# Patient Record
Sex: FEMALE | Race: BLACK OR AFRICAN AMERICAN | Employment: OTHER | URBAN - METROPOLITAN AREA
[De-identification: names, ages, dates, MRNs, and addresses within clinical notes are randomized per-mention and may not be internally consistent; named-entity substitution may affect disease eponyms.]

---

## 2017-01-12 ENCOUNTER — HOSPITAL ENCOUNTER (OUTPATIENT)
Dept: INFUSION THERAPY | Age: 82
Discharge: HOME | End: 2017-01-12
Payer: MEDICARE

## 2017-01-12 VITALS
SYSTOLIC BLOOD PRESSURE: 122 MMHG | DIASTOLIC BLOOD PRESSURE: 50 MMHG | HEART RATE: 51 BPM | TEMPERATURE: 97.3 F | RESPIRATION RATE: 16 BRPM

## 2017-01-12 LAB
ALBUMIN SERPL BCP-MCNC: 3.4 G/DL (ref 3.5–5)
ANION GAP BLD CALC-SCNC: 9 MMOL/L (ref 5–15)
BUN SERPL-MCNC: 38 MG/DL (ref 6–20)
BUN/CREAT SERPL: 26 (ref 12–20)
CALCIUM SERPL-MCNC: 8.3 MG/DL (ref 8.5–10.1)
CALCIUM SERPL-MCNC: 8.6 MG/DL (ref 8.5–10.1)
CHLORIDE SERPL-SCNC: 110 MMOL/L (ref 97–108)
CO2 SERPL-SCNC: 22 MMOL/L (ref 21–32)
CREAT SERPL-MCNC: 1.45 MG/DL (ref 0.55–1.02)
FERRITIN SERPL-MCNC: 613 NG/ML (ref 8–252)
GLUCOSE SERPL-MCNC: 88 MG/DL (ref 65–100)
HCT VFR BLD AUTO: 32 % (ref 35–47)
HGB BLD-MCNC: 10.3 G/DL (ref 11.5–16)
IRON SATN MFR SERPL: 25 % (ref 20–50)
IRON SERPL-MCNC: 52 UG/DL (ref 35–150)
PHOSPHATE SERPL-MCNC: 3.7 MG/DL (ref 2.6–4.7)
POTASSIUM SERPL-SCNC: 5.3 MMOL/L (ref 3.5–5.1)
PTH-INTACT SERPL-MCNC: 129.1 PG/ML (ref 14–72)
SODIUM SERPL-SCNC: 141 MMOL/L (ref 136–145)
TIBC SERPL-MCNC: 207 UG/DL (ref 250–450)
URATE SERPL-MCNC: 6.4 MG/DL (ref 2.6–6)

## 2017-01-12 PROCEDURE — 83970 ASSAY OF PARATHORMONE: CPT | Performed by: INTERNAL MEDICINE

## 2017-01-12 PROCEDURE — 74011250636 HC RX REV CODE- 250/636: Performed by: INTERNAL MEDICINE

## 2017-01-12 PROCEDURE — 36415 COLL VENOUS BLD VENIPUNCTURE: CPT | Performed by: INTERNAL MEDICINE

## 2017-01-12 PROCEDURE — 80069 RENAL FUNCTION PANEL: CPT | Performed by: INTERNAL MEDICINE

## 2017-01-12 PROCEDURE — 96372 THER/PROPH/DIAG INJ SC/IM: CPT

## 2017-01-12 PROCEDURE — 84550 ASSAY OF BLOOD/URIC ACID: CPT | Performed by: INTERNAL MEDICINE

## 2017-01-12 PROCEDURE — 83540 ASSAY OF IRON: CPT | Performed by: INTERNAL MEDICINE

## 2017-01-12 PROCEDURE — 82728 ASSAY OF FERRITIN: CPT | Performed by: INTERNAL MEDICINE

## 2017-01-12 PROCEDURE — 85018 HEMOGLOBIN: CPT | Performed by: INTERNAL MEDICINE

## 2017-01-12 RX ADMIN — ERYTHROPOIETIN 20000 UNITS: 20000 INJECTION, SOLUTION INTRAVENOUS; SUBCUTANEOUS at 15:26

## 2017-01-12 NOTE — PROGRESS NOTES
Outpatient Infusion Center Short Visit Progress Note    1430 Pt admit to [facility] for Labs and possible procrit ambulatory in stable condition. Assessment completed. No new concerns voiced. Please review pending lab results in [address] [place] [address]. Visit Vitals    /50    Pulse (!) 51    Temp 97.3 °F (36.3 °C)    Resp 16           Medications:  Procrit  SQ right arm     1530 Pt tolerated treatment well. D/c home ambulatory in no distress. Pt aware of next appointment scheduled for 2//9/17 .

## 2017-02-09 ENCOUNTER — HOSPITAL ENCOUNTER (OUTPATIENT)
Dept: INFUSION THERAPY | Age: 82
Discharge: HOME | End: 2017-02-09
Payer: MEDICARE

## 2017-02-09 VITALS
HEART RATE: 47 BPM | TEMPERATURE: 97.2 F | RESPIRATION RATE: 16 BRPM | DIASTOLIC BLOOD PRESSURE: 52 MMHG | SYSTOLIC BLOOD PRESSURE: 109 MMHG

## 2017-02-09 LAB
ALBUMIN SERPL BCP-MCNC: 3.2 G/DL (ref 3.5–5)
ANION GAP BLD CALC-SCNC: 8 MMOL/L (ref 5–15)
BUN SERPL-MCNC: 42 MG/DL (ref 6–20)
BUN/CREAT SERPL: 27 (ref 12–20)
CALCIUM SERPL-MCNC: 8.5 MG/DL (ref 8.5–10.1)
CALCIUM SERPL-MCNC: 8.7 MG/DL (ref 8.5–10.1)
CHLORIDE SERPL-SCNC: 110 MMOL/L (ref 97–108)
CO2 SERPL-SCNC: 21 MMOL/L (ref 21–32)
CREAT SERPL-MCNC: 1.55 MG/DL (ref 0.55–1.02)
GLUCOSE SERPL-MCNC: 121 MG/DL (ref 65–100)
HCT VFR BLD AUTO: 31.5 % (ref 35–47)
HGB BLD-MCNC: 10.2 G/DL (ref 11.5–16)
PHOSPHATE SERPL-MCNC: 3.6 MG/DL (ref 2.6–4.7)
POTASSIUM SERPL-SCNC: 5.4 MMOL/L (ref 3.5–5.1)
PTH-INTACT SERPL-MCNC: 126.3 PG/ML (ref 14–72)
SODIUM SERPL-SCNC: 139 MMOL/L (ref 136–145)
URATE SERPL-MCNC: 6.3 MG/DL (ref 2.6–6)

## 2017-02-09 PROCEDURE — 36415 COLL VENOUS BLD VENIPUNCTURE: CPT | Performed by: INTERNAL MEDICINE

## 2017-02-09 PROCEDURE — 80069 RENAL FUNCTION PANEL: CPT | Performed by: INTERNAL MEDICINE

## 2017-02-09 PROCEDURE — 85018 HEMOGLOBIN: CPT | Performed by: INTERNAL MEDICINE

## 2017-02-09 PROCEDURE — 74011250636 HC RX REV CODE- 250/636: Performed by: INTERNAL MEDICINE

## 2017-02-09 PROCEDURE — 83970 ASSAY OF PARATHORMONE: CPT | Performed by: INTERNAL MEDICINE

## 2017-02-09 PROCEDURE — 84550 ASSAY OF BLOOD/URIC ACID: CPT | Performed by: INTERNAL MEDICINE

## 2017-02-09 PROCEDURE — 96372 THER/PROPH/DIAG INJ SC/IM: CPT

## 2017-02-09 RX ORDER — SODIUM CHLORIDE 0.9 % (FLUSH) 0.9 %
10-40 SYRINGE (ML) INJECTION AS NEEDED
Status: DISCONTINUED | OUTPATIENT
Start: 2017-02-09 | End: 2017-02-13 | Stop reason: HOSPADM

## 2017-02-09 RX ORDER — HEPARIN 100 UNIT/ML
500 SYRINGE INTRAVENOUS AS NEEDED
Status: ACTIVE | OUTPATIENT
Start: 2017-02-09 | End: 2017-02-10

## 2017-02-09 RX ORDER — SODIUM CHLORIDE 9 MG/ML
10 INJECTION INTRAMUSCULAR; INTRAVENOUS; SUBCUTANEOUS AS NEEDED
Status: ACTIVE | OUTPATIENT
Start: 2017-02-09 | End: 2017-02-10

## 2017-02-09 RX ADMIN — ERYTHROPOIETIN 20000 UNITS: 20000 INJECTION, SOLUTION INTRAVENOUS; SUBCUTANEOUS at 15:43

## 2017-02-10 NOTE — PROGRESS NOTES
Outpatient Infusion Center Progress Note    1430 Pt admit to [facility] for labs/Procrit. Pt wheelchair bound and in stable condition. Care worker at bedside. Assessment completed. No new concerns voiced. Labs drawn peripherally as ordered. Recent Results (from the past 12 hour(s))   HGB & HCT    Collection Time: 02/09/17  2:38 PM   Result Value Ref Range    HGB 10.2 (L) 11.5 - 16.0 g/dL    HCT 31.5 (L) 35.0 - 47.0 %   RENAL FUNCTION PANEL    Collection Time: 02/09/17  2:38 PM   Result Value Ref Range    Sodium 139 136 - 145 mmol/L    Potassium 5.4 (H) 3.5 - 5.1 mmol/L    Chloride 110 (H) 97 - 108 mmol/L    CO2 21 21 - 32 mmol/L    Anion gap 8 5 - 15 mmol/L    Glucose 121 (H) 65 - 100 mg/dL    BUN 42 (H) 6 - 20 MG/DL    Creatinine 1.55 (H) 0.55 - 1.02 MG/DL    BUN/Creatinine ratio 27 (H) 12 - 20      GFR est AA 38 (L) >60 ml/min/1.73m2    GFR est non-AA 32 (L) >60 ml/min/1.73m2    Calcium 8.7 8.5 - 10.1 MG/DL    Phosphorus 3.6 2.6 - 4.7 MG/DL    Albumin 3.2 (L) 3.5 - 5.0 g/dL   URIC ACID    Collection Time: 02/09/17  2:38 PM   Result Value Ref Range    Uric acid 6.3 (H) 2.6 - 6.0 MG/DL   PTH INTACT    Collection Time: 02/09/17  2:38 PM   Result Value Ref Range    Calcium 8.5 8.5 - 10.1 MG/DL    PTH, Intact 126.3 (H) 14.0 - 72.0 pg/mL         Visit Vitals    /52    Pulse (!) 47    Temp 97.2 °F (36.2 °C)    Resp 16    Breastfeeding No       Medications:  Procrit-SQ-left arm    1545 Pt tolerated treatment well. D/c home in no distress. Pt aware of next appointment scheduled for 03/09/17.

## 2017-03-09 ENCOUNTER — HOSPITAL ENCOUNTER (OUTPATIENT)
Dept: INFUSION THERAPY | Age: 82
Discharge: HOME | End: 2017-03-09
Payer: MEDICARE

## 2017-03-09 VITALS
DIASTOLIC BLOOD PRESSURE: 61 MMHG | RESPIRATION RATE: 16 BRPM | TEMPERATURE: 97.1 F | HEART RATE: 50 BPM | SYSTOLIC BLOOD PRESSURE: 115 MMHG

## 2017-03-09 LAB
ALBUMIN SERPL BCP-MCNC: 3.3 G/DL (ref 3.5–5)
ANION GAP BLD CALC-SCNC: 7 MMOL/L (ref 5–15)
BUN SERPL-MCNC: 49 MG/DL (ref 6–20)
BUN/CREAT SERPL: 31 (ref 12–20)
CALCIUM SERPL-MCNC: 8.6 MG/DL (ref 8.5–10.1)
CALCIUM SERPL-MCNC: 8.7 MG/DL (ref 8.5–10.1)
CHLORIDE SERPL-SCNC: 106 MMOL/L (ref 97–108)
CO2 SERPL-SCNC: 26 MMOL/L (ref 21–32)
CREAT SERPL-MCNC: 1.6 MG/DL (ref 0.55–1.02)
GLUCOSE SERPL-MCNC: 86 MG/DL (ref 65–100)
HCT VFR BLD AUTO: 32 % (ref 35–47)
HGB BLD-MCNC: 10.2 G/DL (ref 11.5–16)
PHOSPHATE SERPL-MCNC: 4.3 MG/DL (ref 2.6–4.7)
POTASSIUM SERPL-SCNC: 5.2 MMOL/L (ref 3.5–5.1)
PTH-INTACT SERPL-MCNC: 112.6 PG/ML (ref 14–72)
SODIUM SERPL-SCNC: 139 MMOL/L (ref 136–145)
URATE SERPL-MCNC: 6.8 MG/DL (ref 2.6–6)

## 2017-03-09 PROCEDURE — 85018 HEMOGLOBIN: CPT | Performed by: INTERNAL MEDICINE

## 2017-03-09 PROCEDURE — 83970 ASSAY OF PARATHORMONE: CPT | Performed by: INTERNAL MEDICINE

## 2017-03-09 PROCEDURE — 80069 RENAL FUNCTION PANEL: CPT | Performed by: INTERNAL MEDICINE

## 2017-03-09 PROCEDURE — 74011250636 HC RX REV CODE- 250/636: Performed by: INTERNAL MEDICINE

## 2017-03-09 PROCEDURE — 36415 COLL VENOUS BLD VENIPUNCTURE: CPT | Performed by: INTERNAL MEDICINE

## 2017-03-09 PROCEDURE — 96372 THER/PROPH/DIAG INJ SC/IM: CPT

## 2017-03-09 PROCEDURE — 84550 ASSAY OF BLOOD/URIC ACID: CPT | Performed by: INTERNAL MEDICINE

## 2017-03-09 RX ADMIN — ERYTHROPOIETIN 20000 UNITS: 20000 INJECTION, SOLUTION INTRAVENOUS; SUBCUTANEOUS at 14:11

## 2017-03-09 NOTE — PROGRESS NOTES
Outpatient Infusion Center - Chemotherapy Progress Note    6434 Pt admit to [facility] for Procrit via wheelchair in stable condition accompanied by caregiver. Assessment completed. Pt has a cast on her L arm; had a fall last Tuesday and fractured her L arm. No new concerns voiced. Labs drawn peripherally and sent for processing. Visit Vitals    /61    Pulse (!) 50    Temp 97.1 °F (36.2 °C)    Resp 16    Breastfeeding No       Medications:  Procrit (SC R arm)    1415 Pt tolerated treatment well. D/c home in no distress. Pt aware of next [facility] appointment scheduled for 04/06/2017.     Recent Results (from the past 12 hour(s))   HGB & HCT    Collection Time: 03/09/17  1:28 PM   Result Value Ref Range    HGB 10.2 (L) 11.5 - 16.0 g/dL    HCT 32.0 (L) 35.0 - 47.0 %   RENAL FUNCTION PANEL    Collection Time: 03/09/17  1:28 PM   Result Value Ref Range    Sodium 139 136 - 145 mmol/L    Potassium 5.2 (H) 3.5 - 5.1 mmol/L    Chloride 106 97 - 108 mmol/L    CO2 26 21 - 32 mmol/L    Anion gap 7 5 - 15 mmol/L    Glucose 86 65 - 100 mg/dL    BUN 49 (H) 6 - 20 MG/DL    Creatinine 1.60 (H) 0.55 - 1.02 MG/DL    BUN/Creatinine ratio 31 (H) 12 - 20      GFR est AA 37 (L) >60 ml/min/1.73m2    GFR est non-AA 30 (L) >60 ml/min/1.73m2    Calcium 8.6 8.5 - 10.1 MG/DL    Phosphorus 4.3 2.6 - 4.7 MG/DL    Albumin 3.3 (L) 3.5 - 5.0 g/dL   URIC ACID    Collection Time: 03/09/17  1:28 PM   Result Value Ref Range    Uric acid 6.8 (H) 2.6 - 6.0 MG/DL

## 2017-04-06 ENCOUNTER — HOSPITAL ENCOUNTER (OUTPATIENT)
Dept: INFUSION THERAPY | Age: 82
Discharge: HOME | End: 2017-04-06
Payer: MEDICARE

## 2017-04-06 VITALS
SYSTOLIC BLOOD PRESSURE: 128 MMHG | OXYGEN SATURATION: 100 % | RESPIRATION RATE: 18 BRPM | DIASTOLIC BLOOD PRESSURE: 53 MMHG | HEART RATE: 52 BPM | TEMPERATURE: 98 F

## 2017-04-06 LAB
ALBUMIN SERPL BCP-MCNC: 3.2 G/DL (ref 3.5–5)
ANION GAP BLD CALC-SCNC: 11 MMOL/L (ref 5–15)
BUN SERPL-MCNC: 41 MG/DL (ref 6–20)
BUN/CREAT SERPL: 25 (ref 12–20)
CALCIUM SERPL-MCNC: 8.4 MG/DL (ref 8.5–10.1)
CALCIUM SERPL-MCNC: 8.5 MG/DL (ref 8.5–10.1)
CHLORIDE SERPL-SCNC: 107 MMOL/L (ref 97–108)
CO2 SERPL-SCNC: 22 MMOL/L (ref 21–32)
CREAT SERPL-MCNC: 1.66 MG/DL (ref 0.55–1.02)
FERRITIN SERPL-MCNC: 465 NG/ML (ref 8–252)
GLUCOSE SERPL-MCNC: 95 MG/DL (ref 65–100)
HCT VFR BLD AUTO: 29.5 % (ref 35–47)
HGB BLD-MCNC: 9.4 G/DL (ref 11.5–16)
IRON SATN MFR SERPL: 30 % (ref 20–50)
IRON SERPL-MCNC: 56 UG/DL (ref 35–150)
PHOSPHATE SERPL-MCNC: 3.7 MG/DL (ref 2.6–4.7)
POTASSIUM SERPL-SCNC: 5.8 MMOL/L (ref 3.5–5.1)
PTH-INTACT SERPL-MCNC: 133.3 PG/ML (ref 14–72)
SODIUM SERPL-SCNC: 140 MMOL/L (ref 136–145)
TIBC SERPL-MCNC: 187 UG/DL (ref 250–450)
URATE SERPL-MCNC: 6.9 MG/DL (ref 2.6–6)

## 2017-04-06 PROCEDURE — 80069 RENAL FUNCTION PANEL: CPT | Performed by: INTERNAL MEDICINE

## 2017-04-06 PROCEDURE — 82728 ASSAY OF FERRITIN: CPT | Performed by: INTERNAL MEDICINE

## 2017-04-06 PROCEDURE — 84550 ASSAY OF BLOOD/URIC ACID: CPT | Performed by: INTERNAL MEDICINE

## 2017-04-06 PROCEDURE — 74011250636 HC RX REV CODE- 250/636: Performed by: INTERNAL MEDICINE

## 2017-04-06 PROCEDURE — 83970 ASSAY OF PARATHORMONE: CPT | Performed by: INTERNAL MEDICINE

## 2017-04-06 PROCEDURE — 83550 IRON BINDING TEST: CPT | Performed by: INTERNAL MEDICINE

## 2017-04-06 PROCEDURE — 36415 COLL VENOUS BLD VENIPUNCTURE: CPT | Performed by: INTERNAL MEDICINE

## 2017-04-06 PROCEDURE — 96372 THER/PROPH/DIAG INJ SC/IM: CPT

## 2017-04-06 PROCEDURE — 85018 HEMOGLOBIN: CPT | Performed by: INTERNAL MEDICINE

## 2017-04-06 RX ADMIN — ERYTHROPOIETIN 20000 UNITS: 20000 INJECTION, SOLUTION INTRAVENOUS; SUBCUTANEOUS at 15:23

## 2017-05-04 ENCOUNTER — HOSPITAL ENCOUNTER (OUTPATIENT)
Dept: INFUSION THERAPY | Age: 82
Discharge: HOME | End: 2017-05-04
Payer: MEDICARE

## 2017-05-04 VITALS
HEART RATE: 45 BPM | DIASTOLIC BLOOD PRESSURE: 53 MMHG | RESPIRATION RATE: 16 BRPM | OXYGEN SATURATION: 100 % | SYSTOLIC BLOOD PRESSURE: 129 MMHG | TEMPERATURE: 97.3 F

## 2017-05-04 LAB
ALBUMIN SERPL BCP-MCNC: 3.7 G/DL (ref 3.5–5)
ANION GAP BLD CALC-SCNC: 8 MMOL/L (ref 5–15)
BUN SERPL-MCNC: 40 MG/DL (ref 6–20)
BUN/CREAT SERPL: 26 (ref 12–20)
CALCIUM SERPL-MCNC: 9.1 MG/DL (ref 8.5–10.1)
CALCIUM SERPL-MCNC: 9.2 MG/DL (ref 8.5–10.1)
CHLORIDE SERPL-SCNC: 108 MMOL/L (ref 97–108)
CO2 SERPL-SCNC: 21 MMOL/L (ref 21–32)
CREAT SERPL-MCNC: 1.55 MG/DL (ref 0.55–1.02)
GLUCOSE SERPL-MCNC: 93 MG/DL (ref 65–100)
HCT VFR BLD AUTO: 34 % (ref 35–47)
HGB BLD-MCNC: 11 G/DL (ref 11.5–16)
PHOSPHATE SERPL-MCNC: 3.9 MG/DL (ref 2.6–4.7)
POTASSIUM SERPL-SCNC: 5.6 MMOL/L (ref 3.5–5.1)
PTH-INTACT SERPL-MCNC: NORMAL PG/ML (ref 14–72)
SODIUM SERPL-SCNC: 137 MMOL/L (ref 136–145)
URATE SERPL-MCNC: 6.9 MG/DL (ref 2.6–6)

## 2017-05-04 PROCEDURE — 85018 HEMOGLOBIN: CPT | Performed by: INTERNAL MEDICINE

## 2017-05-04 PROCEDURE — 36415 COLL VENOUS BLD VENIPUNCTURE: CPT | Performed by: INTERNAL MEDICINE

## 2017-05-04 PROCEDURE — 83970 ASSAY OF PARATHORMONE: CPT | Performed by: INTERNAL MEDICINE

## 2017-05-04 PROCEDURE — 84550 ASSAY OF BLOOD/URIC ACID: CPT | Performed by: INTERNAL MEDICINE

## 2017-05-04 PROCEDURE — 80069 RENAL FUNCTION PANEL: CPT | Performed by: INTERNAL MEDICINE

## 2017-05-04 NOTE — PROGRESS NOTES
1559 Received phone call from lab approx 45 minutes after patient discharge to nursing home. Specimen obtained by phlebotomist was not enough to process the PTH requested by provider. Recent Results (from the past 12 hour(s))   PTH INTACT    Collection Time: 05/04/17  2:13 PM   Result Value Ref Range    Calcium 9.1 8.5 - 10.1 MG/DL    PTH, Intact QUANTITY NOT SUFFICIENT.  SUGGEST RECOLLECTION 14.0 - 72.0 pg/mL   RENAL FUNCTION PANEL    Collection Time: 05/04/17  2:13 PM   Result Value Ref Range    Sodium 137 136 - 145 mmol/L    Potassium 5.6 (H) 3.5 - 5.1 mmol/L    Chloride 108 97 - 108 mmol/L    CO2 21 21 - 32 mmol/L    Anion gap 8 5 - 15 mmol/L    Glucose 93 65 - 100 mg/dL    BUN 40 (H) 6 - 20 MG/DL    Creatinine 1.55 (H) 0.55 - 1.02 MG/DL    BUN/Creatinine ratio 26 (H) 12 - 20      GFR est AA 38 (L) >60 ml/min/1.73m2    GFR est non-AA 32 (L) >60 ml/min/1.73m2    Calcium 9.2 8.5 - 10.1 MG/DL    Phosphorus 3.9 2.6 - 4.7 MG/DL    Albumin 3.7 3.5 - 5.0 g/dL   URIC ACID    Collection Time: 05/04/17  2:13 PM   Result Value Ref Range    Uric acid 6.9 (H) 2.6 - 6.0 MG/DL   HGB & HCT    Collection Time: 05/04/17  2:13 PM   Result Value Ref Range    HGB 11.0 (L) 11.5 - 16.0 g/dL    HCT 34.0 (L) 35.0 - 47.0 %

## 2017-05-04 NOTE — PROGRESS NOTES
[facility] Short Visit Note:    1400:  Pt arrived ambulatory and in no distress, for labs and epoetin injections. Visit Vitals    /53 (BP 1 Location: Right arm, BP Patient Position: Sitting)    Pulse (!) 45    Temp 97.3 °F (36.3 °C)    Resp 16    SpO2 100%     Recent Results (from the past 12 hour(s))   HGB & HCT    Collection Time: 05/04/17  2:13 PM   Result Value Ref Range    HGB 11.0 (L) 11.5 - 16.0 g/dL    HCT 34.0 (L) 35.0 - 47.0 %     Due to Hgb of 11.0 Epogen held per MD orders. 1505: D/Cd from [place] ambulatory and in no distress. Next visit 6/1/17.

## 2017-06-01 ENCOUNTER — APPOINTMENT (OUTPATIENT)
Dept: INFUSION THERAPY | Age: 82
End: 2017-06-01
Payer: MEDICARE

## 2017-06-08 ENCOUNTER — HOSPITAL ENCOUNTER (OUTPATIENT)
Dept: INFUSION THERAPY | Age: 82
Discharge: HOME | End: 2017-06-08
Payer: MEDICARE

## 2017-06-08 VITALS
DIASTOLIC BLOOD PRESSURE: 45 MMHG | SYSTOLIC BLOOD PRESSURE: 140 MMHG | RESPIRATION RATE: 16 BRPM | HEART RATE: 47 BPM | TEMPERATURE: 98.1 F

## 2017-06-08 LAB
ALBUMIN SERPL BCP-MCNC: 3.2 G/DL (ref 3.5–5)
ANION GAP BLD CALC-SCNC: 8 MMOL/L (ref 5–15)
BUN SERPL-MCNC: 40 MG/DL (ref 6–20)
BUN/CREAT SERPL: 27 (ref 12–20)
CALCIUM SERPL-MCNC: 9.2 MG/DL (ref 8.5–10.1)
CALCIUM SERPL-MCNC: 9.3 MG/DL (ref 8.5–10.1)
CHLORIDE SERPL-SCNC: 108 MMOL/L (ref 97–108)
CO2 SERPL-SCNC: 21 MMOL/L (ref 21–32)
CREAT SERPL-MCNC: 1.48 MG/DL (ref 0.55–1.02)
GLUCOSE SERPL-MCNC: 91 MG/DL (ref 65–100)
HCT VFR BLD AUTO: 27.8 % (ref 35–47)
HGB BLD-MCNC: 8.8 G/DL (ref 11.5–16)
PHOSPHATE SERPL-MCNC: 3.4 MG/DL (ref 2.6–4.7)
POTASSIUM SERPL-SCNC: 5.9 MMOL/L (ref 3.5–5.1)
PTH-INTACT SERPL-MCNC: 117.8 PG/ML (ref 14–72)
SODIUM SERPL-SCNC: 137 MMOL/L (ref 136–145)
URATE SERPL-MCNC: 6.6 MG/DL (ref 2.6–6)

## 2017-06-08 PROCEDURE — 80069 RENAL FUNCTION PANEL: CPT | Performed by: INTERNAL MEDICINE

## 2017-06-08 PROCEDURE — 83970 ASSAY OF PARATHORMONE: CPT | Performed by: INTERNAL MEDICINE

## 2017-06-08 PROCEDURE — 36415 COLL VENOUS BLD VENIPUNCTURE: CPT | Performed by: INTERNAL MEDICINE

## 2017-06-08 PROCEDURE — 84550 ASSAY OF BLOOD/URIC ACID: CPT | Performed by: INTERNAL MEDICINE

## 2017-06-08 PROCEDURE — 74011250636 HC RX REV CODE- 250/636: Performed by: INTERNAL MEDICINE

## 2017-06-08 PROCEDURE — 85018 HEMOGLOBIN: CPT | Performed by: INTERNAL MEDICINE

## 2017-06-08 PROCEDURE — 96372 THER/PROPH/DIAG INJ SC/IM: CPT

## 2017-06-08 RX ADMIN — ERYTHROPOIETIN 20000 UNITS: 20000 INJECTION, SOLUTION INTRAVENOUS; SUBCUTANEOUS at 15:35

## 2017-06-08 NOTE — PROGRESS NOTES
Outpatient Infusion Center Short Visit Progress Note    1440 Pt admit to [facility] for Procrit ambulatory in stable condition. Assessment completed. No new concerns voiced. Please review pending lab results in [address] [place] [address]. Patient Vitals for the past 12 hrs:   Temp Pulse Resp BP   06/08/17 1442 98.1 °F (36.7 °C) (!) 47 16 140/45         Medications:  Procrit SQ Right arm     1540 Pt tolerated treatment well. D/c home ambulatory in no distress. Pt aware of next appointment scheduled for 7/6/17.     Recent Results (from the past 12 hour(s))   HGB & HCT    Collection Time: 06/08/17  2:45 PM   Result Value Ref Range    HGB 8.8 (L) 11.5 - 16.0 g/dL    HCT 27.8 (L) 35.0 - 47.0 %   RENAL FUNCTION PANEL    Collection Time: 06/08/17  2:45 PM   Result Value Ref Range    Sodium 137 136 - 145 mmol/L    Potassium 5.9 (H) 3.5 - 5.1 mmol/L    Chloride 108 97 - 108 mmol/L    CO2 21 21 - 32 mmol/L    Anion gap 8 5 - 15 mmol/L    Glucose 91 65 - 100 mg/dL    BUN 40 (H) 6 - 20 MG/DL    Creatinine 1.48 (H) 0.55 - 1.02 MG/DL    BUN/Creatinine ratio 27 (H) 12 - 20      GFR est AA 40 (L) >60 ml/min/1.73m2    GFR est non-AA 33 (L) >60 ml/min/1.73m2    Calcium 9.3 8.5 - 10.1 MG/DL    Phosphorus 3.4 2.6 - 4.7 MG/DL    Albumin 3.2 (L) 3.5 - 5.0 g/dL   URIC ACID    Collection Time: 06/08/17  2:45 PM   Result Value Ref Range    Uric acid 6.6 (H) 2.6 - 6.0 MG/DL   PTH INTACT    Collection Time: 06/08/17  2:45 PM   Result Value Ref Range    Calcium 9.2 8.5 - 10.1 MG/DL    PTH, Intact 117.8 (H) 14.0 - 72.0 pg/mL

## 2017-06-29 ENCOUNTER — APPOINTMENT (OUTPATIENT)
Dept: INFUSION THERAPY | Age: 82
End: 2017-06-29
Payer: MEDICARE

## 2017-07-06 ENCOUNTER — HOSPITAL ENCOUNTER (OUTPATIENT)
Dept: INFUSION THERAPY | Age: 82
Discharge: HOME | End: 2017-07-06

## 2017-08-03 ENCOUNTER — HOSPITAL ENCOUNTER (OUTPATIENT)
Dept: INFUSION THERAPY | Age: 82
Discharge: HOME | End: 2017-08-03
Payer: MEDICARE

## 2017-08-03 VITALS
SYSTOLIC BLOOD PRESSURE: 120 MMHG | TEMPERATURE: 97.6 F | DIASTOLIC BLOOD PRESSURE: 54 MMHG | RESPIRATION RATE: 16 BRPM | HEART RATE: 52 BPM

## 2017-08-03 LAB
ALBUMIN SERPL BCP-MCNC: 3.4 G/DL (ref 3.5–5)
ANION GAP BLD CALC-SCNC: 9 MMOL/L (ref 5–15)
BUN SERPL-MCNC: 36 MG/DL (ref 6–20)
BUN/CREAT SERPL: 24 (ref 12–20)
CALCIUM SERPL-MCNC: 9 MG/DL (ref 8.5–10.1)
CALCIUM SERPL-MCNC: 9.3 MG/DL (ref 8.5–10.1)
CHLORIDE SERPL-SCNC: 108 MMOL/L (ref 97–108)
CO2 SERPL-SCNC: 23 MMOL/L (ref 21–32)
CREAT SERPL-MCNC: 1.47 MG/DL (ref 0.55–1.02)
FERRITIN SERPL-MCNC: 716 NG/ML (ref 8–252)
GLUCOSE SERPL-MCNC: 100 MG/DL (ref 65–100)
HCT VFR BLD AUTO: 30.6 % (ref 35–47)
HGB BLD-MCNC: 9.8 G/DL (ref 11.5–16)
IRON SATN MFR SERPL: 41 % (ref 20–50)
IRON SERPL-MCNC: 77 UG/DL (ref 35–150)
PHOSPHATE SERPL-MCNC: 4.1 MG/DL (ref 2.6–4.7)
POTASSIUM SERPL-SCNC: 5.6 MMOL/L (ref 3.5–5.1)
PTH-INTACT SERPL-MCNC: 130.9 PG/ML (ref 14–72)
SODIUM SERPL-SCNC: 140 MMOL/L (ref 136–145)
TIBC SERPL-MCNC: 186 UG/DL (ref 250–450)
URATE SERPL-MCNC: 6.8 MG/DL (ref 2.6–6)

## 2017-08-03 PROCEDURE — 83540 ASSAY OF IRON: CPT | Performed by: INTERNAL MEDICINE

## 2017-08-03 PROCEDURE — 84550 ASSAY OF BLOOD/URIC ACID: CPT | Performed by: INTERNAL MEDICINE

## 2017-08-03 PROCEDURE — 36415 COLL VENOUS BLD VENIPUNCTURE: CPT | Performed by: INTERNAL MEDICINE

## 2017-08-03 PROCEDURE — 82728 ASSAY OF FERRITIN: CPT | Performed by: INTERNAL MEDICINE

## 2017-08-03 PROCEDURE — 83970 ASSAY OF PARATHORMONE: CPT | Performed by: INTERNAL MEDICINE

## 2017-08-03 PROCEDURE — 85018 HEMOGLOBIN: CPT | Performed by: INTERNAL MEDICINE

## 2017-08-03 PROCEDURE — 80069 RENAL FUNCTION PANEL: CPT | Performed by: INTERNAL MEDICINE

## 2017-08-03 PROCEDURE — 74011250636 HC RX REV CODE- 250/636: Performed by: INTERNAL MEDICINE

## 2017-08-03 PROCEDURE — 96372 THER/PROPH/DIAG INJ SC/IM: CPT

## 2017-08-03 RX ADMIN — ERYTHROPOIETIN 20000 UNITS: 20000 INJECTION, SOLUTION INTRAVENOUS; SUBCUTANEOUS at 15:01

## 2017-08-03 NOTE — PROGRESS NOTES
Outpatient Infusion Center Short Visit Progress Note    1330 Pt admit to [facility] for labs and possible Procrit via wheelchair with caregiver in stable condition. Assessment completed. No new concerns voiced. Labs drawn peripherally by Susan MÉNDEZ and sent for processing. Patient Vitals for the past 12 hrs:   Temp Pulse Resp BP   08/03/17 1333 97.6 °F (36.4 °C) (!) 52 16 120/54       Medications:  Procrit SQ left arm    1505 Pt tolerated treatment well. D/c home via wheelchair with caregiver in no distress. Pt aware of next appointment scheduled for 8/31/17.     Recent Results (from the past 12 hour(s))   HGB & HCT    Collection Time: 08/03/17  1:37 PM   Result Value Ref Range    HGB 9.8 (L) 11.5 - 16.0 g/dL    HCT 30.6 (L) 35.0 - 47.0 %   RENAL FUNCTION PANEL    Collection Time: 08/03/17  1:37 PM   Result Value Ref Range    Sodium 140 136 - 145 mmol/L    Potassium 5.6 (H) 3.5 - 5.1 mmol/L    Chloride 108 97 - 108 mmol/L    CO2 23 21 - 32 mmol/L    Anion gap 9 5 - 15 mmol/L    Glucose 100 65 - 100 mg/dL    BUN 36 (H) 6 - 20 MG/DL    Creatinine 1.47 (H) 0.55 - 1.02 MG/DL    BUN/Creatinine ratio 24 (H) 12 - 20      GFR est AA 41 (L) >60 ml/min/1.73m2    GFR est non-AA 34 (L) >60 ml/min/1.73m2    Calcium 9.3 8.5 - 10.1 MG/DL    Phosphorus 4.1 2.6 - 4.7 MG/DL    Albumin 3.4 (L) 3.5 - 5.0 g/dL   FERRITIN    Collection Time: 08/03/17  1:37 PM   Result Value Ref Range    Ferritin 716 (H) 8 - 252 NG/ML   PTH INTACT    Collection Time: 08/03/17  1:37 PM   Result Value Ref Range    Calcium 9.0 8.5 - 10.1 MG/DL    PTH, Intact PENDING pg/mL   IRON PROFILE    Collection Time: 08/03/17  1:37 PM   Result Value Ref Range    Iron 77 35 - 150 ug/dL    TIBC 186 (L) 250 - 450 ug/dL    Iron % saturation 41 20 - 50 %   URIC ACID    Collection Time: 08/03/17  1:37 PM   Result Value Ref Range    Uric acid 6.8 (H) 2.6 - 6.0 MG/DL

## 2017-08-31 ENCOUNTER — HOSPITAL ENCOUNTER (OUTPATIENT)
Dept: INFUSION THERAPY | Age: 82
Discharge: HOME | End: 2017-08-31
Payer: MEDICARE

## 2017-09-28 ENCOUNTER — HOSPITAL ENCOUNTER (OUTPATIENT)
Dept: INFUSION THERAPY | Age: 82
Discharge: HOME | End: 2017-09-28
Payer: MEDICARE

## 2017-09-28 VITALS
TEMPERATURE: 98.5 F | HEART RATE: 41 BPM | SYSTOLIC BLOOD PRESSURE: 144 MMHG | DIASTOLIC BLOOD PRESSURE: 54 MMHG | OXYGEN SATURATION: 100 % | RESPIRATION RATE: 16 BRPM

## 2017-09-28 LAB
ALBUMIN SERPL-MCNC: 3.4 G/DL (ref 3.5–5)
ANION GAP SERPL CALC-SCNC: 7 MMOL/L (ref 5–15)
BUN SERPL-MCNC: 38 MG/DL (ref 6–20)
BUN/CREAT SERPL: 30 (ref 12–20)
CALCIUM SERPL-MCNC: 9.2 MG/DL (ref 8.5–10.1)
CALCIUM SERPL-MCNC: 9.4 MG/DL (ref 8.5–10.1)
CHLORIDE SERPL-SCNC: 110 MMOL/L (ref 97–108)
CO2 SERPL-SCNC: 22 MMOL/L (ref 21–32)
CREAT SERPL-MCNC: 1.25 MG/DL (ref 0.55–1.02)
GLUCOSE SERPL-MCNC: 85 MG/DL (ref 65–100)
HCT VFR BLD AUTO: 30 % (ref 35–47)
HGB BLD-MCNC: 9.8 G/DL (ref 11.5–16)
PHOSPHATE SERPL-MCNC: 3.2 MG/DL (ref 2.6–4.7)
POTASSIUM SERPL-SCNC: 5.7 MMOL/L (ref 3.5–5.1)
PTH-INTACT SERPL-MCNC: 93.6 PG/ML (ref 14–72)
SODIUM SERPL-SCNC: 139 MMOL/L (ref 136–145)
URATE SERPL-MCNC: 6.9 MG/DL (ref 2.6–6)

## 2017-09-28 PROCEDURE — 74011250636 HC RX REV CODE- 250/636: Performed by: INTERNAL MEDICINE

## 2017-09-28 PROCEDURE — 85018 HEMOGLOBIN: CPT | Performed by: INTERNAL MEDICINE

## 2017-09-28 PROCEDURE — 83970 ASSAY OF PARATHORMONE: CPT | Performed by: INTERNAL MEDICINE

## 2017-09-28 PROCEDURE — 80069 RENAL FUNCTION PANEL: CPT | Performed by: INTERNAL MEDICINE

## 2017-09-28 PROCEDURE — 84550 ASSAY OF BLOOD/URIC ACID: CPT | Performed by: INTERNAL MEDICINE

## 2017-09-28 PROCEDURE — 96372 THER/PROPH/DIAG INJ SC/IM: CPT

## 2017-09-28 PROCEDURE — 36415 COLL VENOUS BLD VENIPUNCTURE: CPT | Performed by: INTERNAL MEDICINE

## 2017-09-28 RX ADMIN — ERYTHROPOIETIN 20000 UNITS: 20000 INJECTION, SOLUTION INTRAVENOUS; SUBCUTANEOUS at 15:01

## 2017-09-28 NOTE — PROGRESS NOTES
1400 Pt admit to [facility] for Procrit/Labs wheelchair with aide in stable condition. Assessment completed. No new concerns voiced. Labs drawn peripherally per order and sent for processing. Labs reviewed, per order procrit required. Spoke with Dr. Tegan Stewart on call at [phone] patient's potassium 5.7. No new orders received. Visit Vitals    /54 (BP 1 Location: Left arm, BP Patient Position: Sitting)    Pulse (!) 41    Temp 98.5 °F (36.9 °C)    Resp 16    SpO2 100%    Breastfeeding No       Medications:  Procrit SQ left arm. 1510 Pt tolerated treatment well. . D/c home in wheelchair with aide in no distress. Pt aware of next appointment scheduled for 10/26/17.     Recent Results (from the past 12 hour(s))   RENAL FUNCTION PANEL    Collection Time: 09/28/17  2:20 PM   Result Value Ref Range    Sodium 139 136 - 145 mmol/L    Potassium 5.7 (H) 3.5 - 5.1 mmol/L    Chloride 110 (H) 97 - 108 mmol/L    CO2 22 21 - 32 mmol/L    Anion gap 7 5 - 15 mmol/L    Glucose 85 65 - 100 mg/dL    BUN 38 (H) 6 - 20 MG/DL    Creatinine 1.25 (H) 0.55 - 1.02 MG/DL    BUN/Creatinine ratio 30 (H) 12 - 20      GFR est AA 49 (L) >60 ml/min/1.73m2    GFR est non-AA 40 (L) >60 ml/min/1.73m2    Calcium 9.2 8.5 - 10.1 MG/DL    Phosphorus 3.2 2.6 - 4.7 MG/DL    Albumin 3.4 (L) 3.5 - 5.0 g/dL   URIC ACID    Collection Time: 09/28/17  2:20 PM   Result Value Ref Range    Uric acid 6.9 (H) 2.6 - 6.0 MG/DL   PTH INTACT    Collection Time: 09/28/17  2:20 PM   Result Value Ref Range    Calcium 9.4 8.5 - 10.1 MG/DL    PTH, Intact 93.6 (H) 14.0 - 72.0 pg/mL   HGB & HCT    Collection Time: 09/28/17  2:20 PM   Result Value Ref Range    HGB 9.8 (L) 11.5 - 16.0 g/dL    HCT 30.0 (L) 35.0 - 47.0 %

## 2017-10-26 ENCOUNTER — HOSPITAL ENCOUNTER (OUTPATIENT)
Dept: INFUSION THERAPY | Age: 82
Discharge: HOME | End: 2017-10-26
Payer: MEDICARE

## 2017-10-26 VITALS
RESPIRATION RATE: 16 BRPM | SYSTOLIC BLOOD PRESSURE: 145 MMHG | DIASTOLIC BLOOD PRESSURE: 58 MMHG | TEMPERATURE: 98.5 F | HEART RATE: 59 BPM

## 2017-10-26 LAB
ALBUMIN SERPL-MCNC: 3.4 G/DL (ref 3.5–5)
ANION GAP SERPL CALC-SCNC: 8 MMOL/L (ref 5–15)
BUN SERPL-MCNC: 51 MG/DL (ref 6–20)
BUN/CREAT SERPL: 30 (ref 12–20)
CALCIUM SERPL-MCNC: 8.9 MG/DL (ref 8.5–10.1)
CALCIUM SERPL-MCNC: 9 MG/DL (ref 8.5–10.1)
CHLORIDE SERPL-SCNC: 110 MMOL/L (ref 97–108)
CO2 SERPL-SCNC: 21 MMOL/L (ref 21–32)
CREAT SERPL-MCNC: 1.71 MG/DL (ref 0.55–1.02)
GLUCOSE SERPL-MCNC: 99 MG/DL (ref 65–100)
HCT VFR BLD AUTO: 32.9 % (ref 35–47)
HGB BLD-MCNC: 10.4 G/DL (ref 11.5–16)
PHOSPHATE SERPL-MCNC: 4.6 MG/DL (ref 2.6–4.7)
POTASSIUM SERPL-SCNC: 6.3 MMOL/L (ref 3.5–5.1)
PTH-INTACT SERPL-MCNC: 121.9 PG/ML (ref 14–72)
SODIUM SERPL-SCNC: 139 MMOL/L (ref 136–145)

## 2017-10-26 PROCEDURE — 85018 HEMOGLOBIN: CPT | Performed by: INTERNAL MEDICINE

## 2017-10-26 PROCEDURE — 74011250636 HC RX REV CODE- 250/636: Performed by: INTERNAL MEDICINE

## 2017-10-26 PROCEDURE — 36415 COLL VENOUS BLD VENIPUNCTURE: CPT | Performed by: INTERNAL MEDICINE

## 2017-10-26 PROCEDURE — 80069 RENAL FUNCTION PANEL: CPT | Performed by: INTERNAL MEDICINE

## 2017-10-26 PROCEDURE — 83970 ASSAY OF PARATHORMONE: CPT | Performed by: INTERNAL MEDICINE

## 2017-10-26 PROCEDURE — 96372 THER/PROPH/DIAG INJ SC/IM: CPT

## 2017-10-26 RX ADMIN — ERYTHROPOIETIN 20000 UNITS: 20000 INJECTION, SOLUTION INTRAVENOUS; SUBCUTANEOUS at 15:39

## 2017-10-26 NOTE — PROGRESS NOTES
Outpatient Infusion Center Progress Note    5744 Pt admit to [facility] for labs/Procrit. Pt wheelchair bound and in stable condition. Care worker at bedside. Assessment completed. No new concerns voiced. Labs drawn peripherally as ordered. Recent Results (from the past 12 hour(s))   RENAL FUNCTION PANEL    Collection Time: 10/26/17  2:45 PM   Result Value Ref Range    Sodium 139 136 - 145 mmol/L    Potassium 6.3 (H) 3.5 - 5.1 mmol/L    Chloride 110 (H) 97 - 108 mmol/L    CO2 21 21 - 32 mmol/L    Anion gap 8 5 - 15 mmol/L    Glucose 99 65 - 100 mg/dL    BUN 51 (H) 6 - 20 MG/DL    Creatinine 1.71 (H) 0.55 - 1.02 MG/DL    BUN/Creatinine ratio 30 (H) 12 - 20      GFR est AA 34 (L) >60 ml/min/1.73m2    GFR est non-AA 28 (L) >60 ml/min/1.73m2    Calcium 8.9 8.5 - 10.1 MG/DL    Phosphorus 4.6 2.6 - 4.7 MG/DL    Albumin 3.4 (L) 3.5 - 5.0 g/dL   HGB & HCT    Collection Time: 10/26/17  2:45 PM   Result Value Ref Range    HGB 10.4 (L) 11.5 - 16.0 g/dL    HCT 32.9 (L) 35.0 - 47.0 %   PTH INTACT    Collection Time: 10/26/17  2:45 PM   Result Value Ref Range    Calcium 9.0 8.5 - 10.1 MG/DL    PTH, Intact 121.9 (H) 14.0 - 72.0 pg/mL         Visit Vitals    /58    Pulse (!) 59    Temp 98.5 °F (36.9 °C)    Resp 16       Medications:  Procrit-SQ-left arm    1540 Pt tolerated treatment well. D/c home in no distress. Pt aware of next appointment scheduled for 11/23/17.

## 2017-11-23 ENCOUNTER — APPOINTMENT (OUTPATIENT)
Dept: INFUSION THERAPY | Age: 82
End: 2017-11-23
Payer: MEDICARE

## 2017-11-30 ENCOUNTER — HOSPITAL ENCOUNTER (OUTPATIENT)
Dept: INFUSION THERAPY | Age: 82
Discharge: HOME | End: 2017-11-30
Payer: MEDICARE

## 2017-11-30 VITALS
TEMPERATURE: 98.4 F | SYSTOLIC BLOOD PRESSURE: 138 MMHG | RESPIRATION RATE: 20 BRPM | HEART RATE: 49 BPM | DIASTOLIC BLOOD PRESSURE: 54 MMHG

## 2017-11-30 LAB
ALBUMIN SERPL-MCNC: 3.4 G/DL (ref 3.5–5)
ANION GAP SERPL CALC-SCNC: 7 MMOL/L (ref 5–15)
BUN SERPL-MCNC: 39 MG/DL (ref 6–20)
BUN/CREAT SERPL: 27 (ref 12–20)
CALCIUM SERPL-MCNC: 9 MG/DL (ref 8.5–10.1)
CALCIUM SERPL-MCNC: 9.3 MG/DL (ref 8.5–10.1)
CHLORIDE SERPL-SCNC: 107 MMOL/L (ref 97–108)
CO2 SERPL-SCNC: 23 MMOL/L (ref 21–32)
CREAT SERPL-MCNC: 1.47 MG/DL (ref 0.55–1.02)
GLUCOSE SERPL-MCNC: 97 MG/DL (ref 65–100)
HCT VFR BLD AUTO: 33.3 % (ref 35–47)
HGB BLD-MCNC: 10.8 G/DL (ref 11.5–16)
PHOSPHATE SERPL-MCNC: 3.8 MG/DL (ref 2.6–4.7)
POTASSIUM SERPL-SCNC: 5.6 MMOL/L (ref 3.5–5.1)
PTH-INTACT SERPL-MCNC: 96.8 PG/ML (ref 14–72)
SODIUM SERPL-SCNC: 137 MMOL/L (ref 136–145)

## 2017-11-30 PROCEDURE — 85018 HEMOGLOBIN: CPT | Performed by: INTERNAL MEDICINE

## 2017-11-30 PROCEDURE — 36415 COLL VENOUS BLD VENIPUNCTURE: CPT | Performed by: INTERNAL MEDICINE

## 2017-11-30 PROCEDURE — 80069 RENAL FUNCTION PANEL: CPT | Performed by: INTERNAL MEDICINE

## 2017-11-30 PROCEDURE — 83970 ASSAY OF PARATHORMONE: CPT | Performed by: INTERNAL MEDICINE

## 2017-11-30 NOTE — PROGRESS NOTES
[facility] VISIT NOTE    8783 Patient arrived ambulatory for Procrit without any acute problems, Patient tolerated medication/treatment well. Please see connect care for full assessments. Procit held due to HCT > 33%    Patient discharged ambulatory without incident. Patient is aware aware of next [facility] appointment on 12/28/2017. Vital Signs:   Patient Vitals for the past 12 hrs:   Temp Pulse Resp BP   11/30/17 1345 98.4 °F (36.9 °C) (!) 49 20 138/54     Lab Results: Lab results pending, please see Connect Care for results.   Recent Results (from the past 12 hour(s))   HGB & HCT    Collection Time: 11/30/17  1:46 PM   Result Value Ref Range    HGB 10.8 (L) 11.5 - 16.0 g/dL    HCT 33.3 (L) 35.0 - 47.0 %

## 2017-12-11 ENCOUNTER — APPOINTMENT (OUTPATIENT)
Dept: GENERAL RADIOLOGY | Age: 82
DRG: 641 | End: 2017-12-11
Attending: PHYSICIAN ASSISTANT
Payer: MEDICARE

## 2017-12-11 ENCOUNTER — HOSPITAL ENCOUNTER (INPATIENT)
Age: 82
LOS: 2 days | Discharge: NURSING, REHAB OR LONG-TERM CARE | DRG: 641 | End: 2017-12-13
Attending: EMERGENCY MEDICINE | Admitting: FAMILY MEDICINE
Payer: MEDICARE

## 2017-12-11 ENCOUNTER — APPOINTMENT (OUTPATIENT)
Dept: CT IMAGING | Age: 82
DRG: 641 | End: 2017-12-11
Attending: PHYSICIAN ASSISTANT
Payer: MEDICARE

## 2017-12-11 DIAGNOSIS — E87.5 ACUTE HYPERKALEMIA: ICD-10-CM

## 2017-12-11 DIAGNOSIS — R55 NEAR SYNCOPE: Primary | ICD-10-CM

## 2017-12-11 DIAGNOSIS — S01.01XA LACERATION OF SCALP, INITIAL ENCOUNTER: ICD-10-CM
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
## 2017-12-11 PROBLEM — S01.91XA LACERATION OF HEAD: Status: ACTIVE | Noted: 2017-12-11

## 2017-12-11 PROBLEM — E86.0 DEHYDRATION: Status: ACTIVE | Noted: 2017-12-11

## 2017-12-11 PROBLEM — R53.1 GENERALIZED WEAKNESS: Status: ACTIVE | Noted: 2017-12-11

## 2017-12-11 LAB
ANION GAP SERPL CALC-SCNC: 6 MMOL/L (ref 5–15)
APPEARANCE UR: CLEAR
ATRIAL RATE: 71 BPM
BACTERIA URNS QL MICRO: ABNORMAL /HPF
BASOPHILS # BLD: 0 K/UL (ref 0–0.1)
BASOPHILS NFR BLD: 0 % (ref 0–1)
BILIRUB UR QL: NEGATIVE
BUN SERPL-MCNC: 35 MG/DL (ref 6–20)
BUN/CREAT SERPL: 29 (ref 12–20)
CALCIUM SERPL-MCNC: 9 MG/DL (ref 8.5–10.1)
CALCULATED P AXIS, ECG09: 79 DEGREES
CALCULATED R AXIS, ECG10: -25 DEGREES
CALCULATED T AXIS, ECG11: 64 DEGREES
CHLORIDE SERPL-SCNC: 109 MMOL/L (ref 97–108)
CO2 SERPL-SCNC: 22 MMOL/L (ref 21–32)
COLOR UR: ABNORMAL
CREAT SERPL-MCNC: 1.21 MG/DL (ref 0.55–1.02)
DIAGNOSIS, 93000: NORMAL
EOSINOPHIL # BLD: 0.3 K/UL (ref 0–0.4)
EOSINOPHIL NFR BLD: 5 % (ref 0–7)
EPITH CASTS URNS QL MICRO: ABNORMAL /LPF
ERYTHROCYTE [DISTWIDTH] IN BLOOD BY AUTOMATED COUNT: 14.5 % (ref 11.5–14.5)
GLUCOSE SERPL-MCNC: 81 MG/DL (ref 65–100)
GLUCOSE UR STRIP.AUTO-MCNC: NEGATIVE MG/DL
HCT VFR BLD AUTO: 31.1 % (ref 35–47)
HGB BLD-MCNC: 9.9 G/DL (ref 11.5–16)
HGB UR QL STRIP: NEGATIVE
KETONES UR QL STRIP.AUTO: NEGATIVE MG/DL
LEUKOCYTE ESTERASE UR QL STRIP.AUTO: NEGATIVE
LYMPHOCYTES # BLD: 1.7 K/UL (ref 0.8–3.5)
LYMPHOCYTES NFR BLD: 32 % (ref 12–49)
MCH RBC QN AUTO: 29.8 PG (ref 26–34)
MCHC RBC AUTO-ENTMCNC: 31.8 G/DL (ref 30–36.5)
MCV RBC AUTO: 93.7 FL (ref 80–99)
MONOCYTES # BLD: 0.4 K/UL (ref 0–1)
MONOCYTES NFR BLD: 7 % (ref 5–13)
NEUTS SEG # BLD: 2.9 K/UL (ref 1.8–8)
NEUTS SEG NFR BLD: 56 % (ref 32–75)
NITRITE UR QL STRIP.AUTO: NEGATIVE
P-R INTERVAL, ECG05: 162 MS
PH UR STRIP: 6.5 [PH] (ref 5–8)
PLATELET # BLD AUTO: 195 K/UL (ref 150–400)
POTASSIUM SERPL-SCNC: 5.5 MMOL/L (ref 3.5–5.1)
PROT UR STRIP-MCNC: 100 MG/DL
Q-T INTERVAL, ECG07: 366 MS
QRS DURATION, ECG06: 88 MS
QTC CALCULATION (BEZET), ECG08: 397 MS
RBC # BLD AUTO: 3.32 M/UL (ref 3.8–5.2)
RBC #/AREA URNS HPF: ABNORMAL /HPF (ref 0–5)
SODIUM SERPL-SCNC: 137 MMOL/L (ref 136–145)
SP GR UR REFRACTOMETRY: 1.01 (ref 1–1.03)
TROPONIN I SERPL-MCNC: <0.04 NG/ML
UROBILINOGEN UR QL STRIP.AUTO: 0.2 EU/DL (ref 0.2–1)
VENTRICULAR RATE, ECG03: 71 BPM
WBC # BLD AUTO: 5.3 K/UL (ref 3.6–11)
WBC URNS QL MICRO: ABNORMAL /HPF (ref 0–4)

## 2017-12-11 PROCEDURE — 77030018836 HC SOL IRR NACL ICUM -A

## 2017-12-11 PROCEDURE — 93005 ELECTROCARDIOGRAM TRACING: CPT

## 2017-12-11 PROCEDURE — 74011250636 HC RX REV CODE- 250/636: Performed by: FAMILY MEDICINE

## 2017-12-11 PROCEDURE — 81001 URINALYSIS AUTO W/SCOPE: CPT | Performed by: PHYSICIAN ASSISTANT

## 2017-12-11 PROCEDURE — 85025 COMPLETE CBC W/AUTO DIFF WBC: CPT | Performed by: PHYSICIAN ASSISTANT

## 2017-12-11 PROCEDURE — 74011000250 HC RX REV CODE- 250: Performed by: PHYSICIAN ASSISTANT

## 2017-12-11 PROCEDURE — 0HQ0XZZ REPAIR SCALP SKIN, EXTERNAL APPROACH: ICD-10-PCS | Performed by: EMERGENCY MEDICINE

## 2017-12-11 PROCEDURE — 99285 EMERGENCY DEPT VISIT HI MDM: CPT

## 2017-12-11 PROCEDURE — 36415 COLL VENOUS BLD VENIPUNCTURE: CPT | Performed by: PHYSICIAN ASSISTANT

## 2017-12-11 PROCEDURE — 84484 ASSAY OF TROPONIN QUANT: CPT | Performed by: PHYSICIAN ASSISTANT

## 2017-12-11 PROCEDURE — 77030031132 HC SUT NYL COVD -A

## 2017-12-11 PROCEDURE — 80048 BASIC METABOLIC PNL TOTAL CA: CPT | Performed by: PHYSICIAN ASSISTANT

## 2017-12-11 PROCEDURE — 77030005563 HC CATH URETH INT MMGH -A

## 2017-12-11 PROCEDURE — 72125 CT NECK SPINE W/O DYE: CPT

## 2017-12-11 PROCEDURE — 74011250637 HC RX REV CODE- 250/637: Performed by: PHYSICIAN ASSISTANT

## 2017-12-11 PROCEDURE — 65660000000 HC RM CCU STEPDOWN

## 2017-12-11 PROCEDURE — 75810000293 HC SIMP/SUPERF WND  RPR

## 2017-12-11 PROCEDURE — 70450 CT HEAD/BRAIN W/O DYE: CPT

## 2017-12-11 PROCEDURE — 71010 XR CHEST PORT: CPT

## 2017-12-11 RX ORDER — AMLODIPINE BESYLATE 5 MG/1
5 TABLET ORAL DAILY
COMMUNITY
End: 2017-12-13

## 2017-12-11 RX ORDER — SODIUM POLYSTYRENE SULFONATE 15 G/60ML
15 SUSPENSION ORAL; RECTAL
Status: COMPLETED | OUTPATIENT
Start: 2017-12-11 | End: 2017-12-11

## 2017-12-11 RX ORDER — SODIUM CHLORIDE 0.9 % (FLUSH) 0.9 %
5-10 SYRINGE (ML) INJECTION AS NEEDED
Status: DISCONTINUED | OUTPATIENT
Start: 2017-12-11 | End: 2017-12-13 | Stop reason: HOSPADM

## 2017-12-11 RX ORDER — SODIUM CHLORIDE 0.9 % (FLUSH) 0.9 %
5-10 SYRINGE (ML) INJECTION EVERY 8 HOURS
Status: DISCONTINUED | OUTPATIENT
Start: 2017-12-12 | End: 2017-12-13 | Stop reason: HOSPADM

## 2017-12-11 RX ORDER — LIDOCAINE HYDROCHLORIDE AND EPINEPHRINE 10; 10 MG/ML; UG/ML
1.5 INJECTION, SOLUTION INFILTRATION; PERINEURAL ONCE
Status: COMPLETED | OUTPATIENT
Start: 2017-12-11 | End: 2017-12-11

## 2017-12-11 RX ORDER — SODIUM CHLORIDE 9 MG/ML
75 INJECTION, SOLUTION INTRAVENOUS CONTINUOUS
Status: DISCONTINUED | OUTPATIENT
Start: 2017-12-11 | End: 2017-12-13 | Stop reason: HOSPADM

## 2017-12-11 RX ORDER — ONDANSETRON 4 MG/1
4 TABLET, FILM COATED ORAL
COMMUNITY

## 2017-12-11 RX ADMIN — SODIUM CHLORIDE 1000 ML: 900 INJECTION, SOLUTION INTRAVENOUS at 21:13

## 2017-12-11 RX ADMIN — SODIUM POLYSTYRENE SULFONATE 15 G: 15 SUSPENSION ORAL; RECTAL at 21:13

## 2017-12-11 RX ADMIN — Medication 10 ML: at 23:45

## 2017-12-11 RX ADMIN — LIDOCAINE HYDROCHLORIDE,EPINEPHRINE BITARTRATE 15 MG: 10; .01 INJECTION, SOLUTION INFILTRATION; PERINEURAL at 17:50

## 2017-12-11 RX ADMIN — SODIUM CHLORIDE 125 ML/HR: 900 INJECTION, SOLUTION INTRAVENOUS at 23:45

## 2017-12-11 NOTE — IP AVS SNAPSHOT
[address] 
[phone] Patient: Elvie Malhotra MRN: [id number] About your hospitalization You were admitted on:  December 11, 2017 You last received care in the:  [facility] 6S NEURO-SCI TELE You were discharged on:  December 13, 2017 Why you were hospitalized Your primary diagnosis was:  Dehydration Your diagnoses also included:  Anemia, Acute Hyperkalemia, Generalized Weakness, Laceration Of Head Things You Need To Do (next 8 weeks) Follow up with Rosana Posey MD in 2 week(s)  
or with facility provider Phone:  [phone] Where:  [address] [place] [address] , [place] [address], [email or web address] [address] Thursday Dec 28, 2017 [facility] 30 [facility]  
Where:  [address] [facility] 55) Thursday Jan 25, 2018 INFUSION 30 with RM 102C - CHAIR [facility] at  2:00 PM  
Where:  [address] ([facility] 55) Discharge Orders None A check camden indicates which time of day the medication should be taken. My Medications STOP taking these medications   
 amLODIPine 5 mg tablet Commonly known as:  Johann Franklin TAKE these medications as instructed Instructions Each Dose to Equal  
 Morning Noon Evening Bedtime  
 acetaminophen 500 mg tablet Commonly known as:  TYLENOL Your last dose was: Your next dose is: Take 500 mg by mouth every six (6) hours as needed for Pain. 500 mg  
    
   
   
   
  
 carbidopa-levodopa  mg per tablet Commonly known as:  SINEMET Your last dose was: Your next dose is: Take 2 Tabs by mouth four (4) times daily. 2 Tab  
    
   
   
   
  
 carvedilol 3.125 mg tablet Commonly known as:  Yosef Olsen Your last dose was: Your next dose is: Take 1 Tab by mouth two (2) times daily (with meals). 3.125 mg  
    
   
   
   
  
 docusate sodium 50 mg capsule Commonly known as:  Noah Tapia Your last dose was: Your next dose is: Take 50 mg by mouth daily. 50 mg  
    
   
   
   
  
 hydrALAZINE 25 mg tablet Commonly known as:  APRESOLINE Your last dose was: Your next dose is: TAKE 1 TABLET BY MOUTH TWICE DAILY losartan 25 mg tablet Commonly known as:  COZAAR Start taking on:  12/14/2017 Your last dose was: Your next dose is: Take 0.5 Tabs by mouth daily. 12.5 mg  
    
   
   
   
  
 lovastatin 20 mg tablet Commonly known as:  MEVACOR Your last dose was: Your next dose is: TAKE 1 TABLET BY MOUTH EVERY DAY  
     
   
   
   
  
 multivitamin with iron tablet Your last dose was: Your next dose is: Take 1 Tab by mouth daily. Belle Vitamins 1 Tab  
    
   
   
   
  
 ondansetron hcl 4 mg tablet Commonly known as:  El Carrillo Your last dose was: Your next dose is: Take 4 mg by mouth every eight (8) hours as needed for Nausea. 4 mg OTHER Your last dose was: Your next dose is:    
   
   
 Rollator Walker with seat icd [address] Where to Get Your Medications Information on where to get these meds will be given to you by the nurse or doctor. ! Ask your nurse or doctor about these medications  
  carvedilol 3.125 mg tablet  
 losartan 25 mg tablet Discharge Instructions None [facility] Announcement We are excited to announce that we are making your provider's discharge notes available to you in [facility].   You will see these notes when they are completed and signed by the physician that discharged you from your recent hospital stay. If you have any questions or concerns about any information you see in [facility], please call the Health Information Department where you were seen or reach out to your Primary Care Provider for more information about your plan of care. Introducing [facility]! Kellie Joseph introduces [facility] patient portal. Now you can access parts of your medical record, email your doctor's office, and request medication refills online. 1. In your internet browser, go to [email or web address] 2. Click on the First Time User? Click Here link in the Sign In box. You will see the New Member Sign Up page. 3. Enter your [facility] Access Code exactly as it appears below. You will not need to use this code after youve completed the sign-up process. If you do not sign up before the expiration date, you must request a new code. · [facility] Access Code: [email or web address] Expires: 3/11/2018  6:40 PM 
 
4. Enter the last four digits of your Social Security Number (xxxx) and Date of Birth (mm/dd/yyyy) as indicated and click Submit. You will be taken to the next sign-up page. 5. Create a [facility] ID. This will be your [facility] login ID and cannot be changed, so think of one that is secure and easy to remember. 6. Create a [facility] password. You can change your password at any time. 7. Enter your Password Reset Question and Answer. This can be used at a later time if you forget your password. 8. Enter your e-mail address. You will receive e-mail notification when new information is available in [address] 9. Click Sign Up. You can now view and download portions of your medical record. 10. Click the Download Summary menu link to download a portable copy of your medical information. If you have questions, please visit the Frequently Asked Questions section of the [facility] website. Remember, [facility] is NOT to be used for urgent needs. For medical emergencies, dial 911. Now available from your iPhone and Android! Providers Seen During Your Hospitalization Provider Specialty Primary office phone Paco Keen MD Emergency Medicine [phone] Michael Mercado MD Family Practice [phone] Rl Addison MD Internal Medicine [phone] Your Primary Care Physician (PCP) Primary Care Physician Office Phone Office Fax [address] [facility], [address] [place] [address] Drive [phone] You are allergic to the following Allergen Reactions Bactroban (Mupirocin Calcium) Other (comments) Skin peels Betadine (Povidone-Iodine) Rash Comtan (Entacapone) Diarrhea Erythromycin Other (comments) GI upset Penicillins Itching Sulfa (Sulfonamide Antibiotics) Itching Recent Documentation Height Weight BMI OB Status Smoking Status 1.626 m 58.7 kg 22.19 kg/m2 Hysterectomy Never Smoker Emergency Contacts Name Discharge Info Relation Home Work Mobile Roxi Romero DISCHARGE CAREGIVER [3] Child [2] [phone] Patient Belongings The following personal items are in your possession at time of discharge: 
  Dental Appliances: None  Visual Aid: None      Home Medications: None   Jewelry: None  Clothing: At bedside, Pants, Shirt, Socks, Footwear    Other Valuables: None Please provide this summary of care documentation to your next provider. Signatures-by signing, you are acknowledging that this After Visit Summary has been reviewed with you and you have received a copy. Patient Signature:  ____________________________________________________________ Date:  ____________________________________________________________  
  
Hoa Copeland Provider Signature:  ____________________________________________________________ Date:  ____________________________________________________________

## 2017-12-11 NOTE — ED PROVIDER NOTES
HPI Comments: 80 yof pw headache, lac to head after GLF at home. States she became dizzy right before she fell. Denies cp/sob. Feels okay now. Denies fever, cough, recent illness. Hx somewhat limited by dementia. Unknown LOC. She ambulates with a walker. Patient is a 80 y.o. female presenting with fall. The history is provided by the patient and the EMS personnel. Fall   The accident occurred less than 1 hour ago. The fall occurred while walking. She fell from a height of ground level. She landed on carpet. The volume of blood lost was minimal. The point of impact was the head. The pain is present in the head. The pain is mild. Associated symptoms include headaches. Pertinent negatives include no fever, no numbness, no abdominal pain, no nausea and no vomiting.         Past Medical History:   Diagnosis Date    Arthritis     Chronic kidney disease     RENAL MASS    Environmental allergies     HTN (hypertension) 1/19/2012    Hyperlipemia 1/19/2012    Hypertension     Other ill-defined conditions(799.89)     parkinson disease    Parkinson disease ([facility] [address] 1/19/2012    S/P radioactive iodine thyroid ablation 1/19/2012    S/P NINI-BSO 1/19/2012    S/P TKR (total knee replacement) 1/19/2012    Thyroid disease     goiter/hyperthyroid (s/p iodine tx 1999)    Unspecified adverse effect of anesthesia     CLAUSTROPHOBIA       Past Surgical History:   Procedure Laterality Date    ENDOSCOPY, COLON, DIAGNOSTIC  7/2011    ([facility])    HX GI      COLONOSCOPY    HX ORTHOPAEDIC      r hip, l knee replacement 2008    HX NINI AND BSO  1946         Family History:   Problem Relation Age of Onset    Coronary Artery Disease Father      s/p MI    Heart Disease Father     Elevated Lipids Father     Cancer Brother      hodgkins    Anesth Problems Neg Hx        Social History     Social History    Marital status:      Spouse name: N/A    Number of children: N/A    Years of education: N/A     Occupational History    Not on file. Social History Main Topics    Smoking status: Never Smoker    Smokeless tobacco: Never Used    Alcohol use No    Drug use: No    Sexual activity: No     Other Topics Concern    Not on file     Social History Narrative         ALLERGIES: Bactroban [mupirocin calcium]; Betadine [povidone-iodine]; Comtan [entacapone]; Erythromycin; Penicillins; and Sulfa (sulfonamide antibiotics)    Review of Systems   Constitutional: Negative for diaphoresis and fever. Eyes: Negative for visual disturbance. Respiratory: Negative for cough and shortness of breath. Cardiovascular: Negative for chest pain and palpitations. Gastrointestinal: Negative for abdominal pain, diarrhea, nausea and vomiting. Genitourinary: Negative for flank pain. Musculoskeletal: Negative for neck pain. Skin: Positive for wound. Neurological: Positive for dizziness, syncope and headaches. Negative for weakness and numbness. Psychiatric/Behavioral: Positive for confusion. Vitals:    12/11/17 1722   BP: 173/76   Pulse: 80   Resp: 18   Temp: 98.2 °F (36.8 °C)   SpO2: 100%   Height: 5' 4\" (1.626 m)            Physical Exam   Constitutional: She is oriented to person, place, and time. She appears well-developed and well-nourished. No distress. HENT:   Head: Normocephalic. Right Ear: External ear normal.   Left Ear: External ear normal.   Mouth/Throat: Oropharynx is clear and moist.   1cm superficial lac right forehead   Eyes: Conjunctivae and EOM are normal. Pupils are equal, round, and reactive to light. No scleral icterus. Neck: Normal range of motion. Neck supple. No stepoff deformity   Cardiovascular: Normal rate, regular rhythm, normal heart sounds and intact distal pulses. Pulmonary/Chest: Effort normal and breath sounds normal. No respiratory distress. Abdominal: Soft. She exhibits no distension. There is no tenderness. There is no rebound and no guarding.    Musculoskeletal: She exhibits no deformity. Neurological: She is alert and oriented to person, place, and time. She exhibits normal muscle tone. Skin: Skin is warm and dry. She is not diaphoretic. No pallor. Psychiatric: She has a normal mood and affect. Her behavior is normal.   Nursing note and vitals reviewed. MDM  Number of Diagnoses or Management Options  Diagnosis management comments: Stable, non-toxic appearing. Workup not revealing for direct cause but patient quite orthostatic here when standing and feels dizzy when ambulating. Will admit to medicine for observation. Patient agrees to plan. ED Course       EKG  Date/Time: 12/11/2017 5:55 PM  Performed by: [employer] Isha by: Aris Conner     ECG reviewed by ED Physician in the absence of a cardiologist: yes    Comments:      Rate 70, NSR, normal axis, conduction, no ST changes or arrhythmias. Wound Repair  Date/Time: 12/11/2017 6:00 PM  Performed by: PAPreparation: skin prepped with Shur-Clens  Location details: face  Wound length:2.5 cm or less  Anesthesia: local infiltration    Anesthesia:  Local Anesthetic: lidocaine 1% with epinephrine  Irrigation solution: saline  Debridement: none  Skin closure: 4-0 nylon  Number of sutures: 3  Technique: simple  Approximation: close  Patient tolerance: Patient tolerated the procedure well with no immediate complications  My total time at bedside, performing this procedure was 1-15 minutes.

## 2017-12-12 LAB
ANION GAP SERPL CALC-SCNC: 7 MMOL/L (ref 5–15)
BASOPHILS # BLD: 0 K/UL (ref 0–0.1)
BASOPHILS NFR BLD: 0 % (ref 0–1)
BUN SERPL-MCNC: 30 MG/DL (ref 6–20)
BUN/CREAT SERPL: 27 (ref 12–20)
CALCIUM SERPL-MCNC: 8.6 MG/DL (ref 8.5–10.1)
CHLORIDE SERPL-SCNC: 112 MMOL/L (ref 97–108)
CHOLEST SERPL-MCNC: 210 MG/DL
CO2 SERPL-SCNC: 24 MMOL/L (ref 21–32)
CREAT SERPL-MCNC: 1.11 MG/DL (ref 0.55–1.02)
EOSINOPHIL # BLD: 0.3 K/UL (ref 0–0.4)
EOSINOPHIL NFR BLD: 6 % (ref 0–7)
ERYTHROCYTE [DISTWIDTH] IN BLOOD BY AUTOMATED COUNT: 14.5 % (ref 11.5–14.5)
GLUCOSE SERPL-MCNC: 71 MG/DL (ref 65–100)
HCT VFR BLD AUTO: 28.5 % (ref 35–47)
HDLC SERPL-MCNC: 63 MG/DL
HDLC SERPL: 3.3 {RATIO} (ref 0–5)
HGB BLD-MCNC: 9.4 G/DL (ref 11.5–16)
LDLC SERPL CALC-MCNC: 133.4 MG/DL (ref 0–100)
LIPID PROFILE,FLP: ABNORMAL
LYMPHOCYTES # BLD: 2 K/UL (ref 0.8–3.5)
LYMPHOCYTES NFR BLD: 35 % (ref 12–49)
MCH RBC QN AUTO: 30.5 PG (ref 26–34)
MCHC RBC AUTO-ENTMCNC: 33 G/DL (ref 30–36.5)
MCV RBC AUTO: 92.5 FL (ref 80–99)
MONOCYTES # BLD: 0.4 K/UL (ref 0–1)
MONOCYTES NFR BLD: 8 % (ref 5–13)
NEUTS SEG # BLD: 3 K/UL (ref 1.8–8)
NEUTS SEG NFR BLD: 51 % (ref 32–75)
PLATELET # BLD AUTO: 193 K/UL (ref 150–400)
POTASSIUM SERPL-SCNC: 5 MMOL/L (ref 3.5–5.1)
RBC # BLD AUTO: 3.08 M/UL (ref 3.8–5.2)
SODIUM SERPL-SCNC: 143 MMOL/L (ref 136–145)
TRIGL SERPL-MCNC: 68 MG/DL (ref ?–150)
VLDLC SERPL CALC-MCNC: 13.6 MG/DL
WBC # BLD AUTO: 5.8 K/UL (ref 3.6–11)

## 2017-12-12 PROCEDURE — G8978 MOBILITY CURRENT STATUS: HCPCS

## 2017-12-12 PROCEDURE — 74011250637 HC RX REV CODE- 250/637: Performed by: INTERNAL MEDICINE

## 2017-12-12 PROCEDURE — 36415 COLL VENOUS BLD VENIPUNCTURE: CPT | Performed by: FAMILY MEDICINE

## 2017-12-12 PROCEDURE — 97535 SELF CARE MNGMENT TRAINING: CPT

## 2017-12-12 PROCEDURE — G8988 SELF CARE GOAL STATUS: HCPCS

## 2017-12-12 PROCEDURE — 97161 PT EVAL LOW COMPLEX 20 MIN: CPT

## 2017-12-12 PROCEDURE — 80048 BASIC METABOLIC PNL TOTAL CA: CPT | Performed by: FAMILY MEDICINE

## 2017-12-12 PROCEDURE — 80061 LIPID PANEL: CPT | Performed by: FAMILY MEDICINE

## 2017-12-12 PROCEDURE — 85025 COMPLETE CBC W/AUTO DIFF WBC: CPT | Performed by: FAMILY MEDICINE

## 2017-12-12 PROCEDURE — 74011250637 HC RX REV CODE- 250/637: Performed by: NURSE PRACTITIONER

## 2017-12-12 PROCEDURE — 97116 GAIT TRAINING THERAPY: CPT

## 2017-12-12 PROCEDURE — G8987 SELF CARE CURRENT STATUS: HCPCS

## 2017-12-12 PROCEDURE — 65660000000 HC RM CCU STEPDOWN

## 2017-12-12 PROCEDURE — 97165 OT EVAL LOW COMPLEX 30 MIN: CPT

## 2017-12-12 PROCEDURE — 97530 THERAPEUTIC ACTIVITIES: CPT

## 2017-12-12 PROCEDURE — G8979 MOBILITY GOAL STATUS: HCPCS

## 2017-12-12 RX ORDER — AMLODIPINE BESYLATE 5 MG/1
5 TABLET ORAL DAILY
Status: DISCONTINUED | OUTPATIENT
Start: 2017-12-12 | End: 2017-12-13 | Stop reason: HOSPADM

## 2017-12-12 RX ORDER — LOVASTATIN 20 MG/1
20 TABLET ORAL
Status: DISCONTINUED | OUTPATIENT
Start: 2017-12-12 | End: 2017-12-13 | Stop reason: HOSPADM

## 2017-12-12 RX ORDER — CARVEDILOL 3.12 MG/1
3.12 TABLET ORAL 2 TIMES DAILY WITH MEALS
Status: DISCONTINUED | OUTPATIENT
Start: 2017-12-12 | End: 2017-12-13 | Stop reason: HOSPADM

## 2017-12-12 RX ORDER — CARBIDOPA AND LEVODOPA 25; 100 MG/1; MG/1
2 TABLET ORAL 4 TIMES DAILY
Status: DISCONTINUED | OUTPATIENT
Start: 2017-12-12 | End: 2017-12-13 | Stop reason: HOSPADM

## 2017-12-12 RX ORDER — HYDRALAZINE HYDROCHLORIDE 25 MG/1
25 TABLET, FILM COATED ORAL 2 TIMES DAILY
Status: DISCONTINUED | OUTPATIENT
Start: 2017-12-12 | End: 2017-12-13 | Stop reason: HOSPADM

## 2017-12-12 RX ORDER — DOCUSATE SODIUM 50 MG/5ML
50 LIQUID ORAL DAILY
Status: DISCONTINUED | OUTPATIENT
Start: 2017-12-12 | End: 2017-12-13 | Stop reason: HOSPADM

## 2017-12-12 RX ORDER — LOSARTAN POTASSIUM 25 MG/1
12.5 TABLET ORAL DAILY
Status: DISCONTINUED | OUTPATIENT
Start: 2017-12-13 | End: 2017-12-13 | Stop reason: HOSPADM

## 2017-12-12 RX ORDER — ACETAMINOPHEN 500 MG
500 TABLET ORAL
Status: DISCONTINUED | OUTPATIENT
Start: 2017-12-12 | End: 2017-12-13 | Stop reason: HOSPADM

## 2017-12-12 RX ADMIN — LOVASTATIN 20 MG: 20 TABLET ORAL at 21:22

## 2017-12-12 RX ADMIN — Medication 10 ML: at 05:23

## 2017-12-12 RX ADMIN — AMLODIPINE BESYLATE 5 MG: 5 TABLET ORAL at 10:20

## 2017-12-12 RX ADMIN — CARBIDOPA AND LEVODOPA 2 TABLET: 25; 100 TABLET ORAL at 13:00

## 2017-12-12 RX ADMIN — CARBIDOPA AND LEVODOPA 2 TABLET: 25; 100 TABLET ORAL at 21:23

## 2017-12-12 RX ADMIN — CARBIDOPA AND LEVODOPA 2 TABLET: 25; 100 TABLET ORAL at 17:18

## 2017-12-12 RX ADMIN — CARBIDOPA AND LEVODOPA 2 TABLET: 25; 100 TABLET ORAL at 10:20

## 2017-12-12 RX ADMIN — HYDRALAZINE HYDROCHLORIDE 25 MG: 25 TABLET ORAL at 17:18

## 2017-12-12 RX ADMIN — CARVEDILOL 3.12 MG: 3.12 TABLET, FILM COATED ORAL at 17:18

## 2017-12-12 RX ADMIN — DOCUSATE SODIUM 50 MG: 50 LIQUID ORAL at 10:21

## 2017-12-12 RX ADMIN — Medication 10 ML: at 21:23

## 2017-12-12 RX ADMIN — HYDRALAZINE HYDROCHLORIDE 25 MG: 25 TABLET ORAL at 10:20

## 2017-12-12 NOTE — ED NOTES
Pt ambulatory to bedside commode with 1 assist. Pt repositioned on stretcher afterwards and lights dimmed for comfort. Call bell within reach. Will continue to monitor.

## 2017-12-12 NOTE — PROGRESS NOTES
[address] with [facility] emergency contact, POA, and daughter Gemini Hernandez per Nalini Buddy request to get admission data base done. Per Ms. Marianolupe when asked about her advanced directives ect. Ms. Miguel Lance states \"my daughter has all that paper work stuff\". Spoke with Ms. Gemini Hernandez at length concerning the pt's advanced directives, flu shot status, living situation (pt [address] at [place]), medications, and reasoning behind Melani Anna's subclavian port. Ms. Emily Way was able to answer these questions but states that augustus will have more information concerning flu shoot and medications and she is unsure if pt has had the flu shot yet this year. Ms. Gemini Hernandez and Nalini Goodwin states that the subclavian port is due to frequent blood draws and poor venous access. Ms. Gemini Hernandez also reports that Nalini Goodwin has Parkinson's and dementia with POC, which is baseline for the pt. Ms. Gemini Hernandez states she is planning to visit her mother in the morning before work. Nurses station number was given to Ms. Aleyda Oliver and an opportunity for questions was given.

## 2017-12-12 NOTE — PROGRESS NOTES
Primary Nurse Evelyn Lopez and HUGO Rodriguez performed a dual skin assessment on this patient Impairment noted- see wound doc flow sheet  Chester score is 20

## 2017-12-12 NOTE — CDMP QUERY
Please clarify if this patient is being treated/managed for:    =>Chronic Kidney Disease Stage 3 (GFR 30-59) in the setting of know history requiring monitoring   =>Other Explanation of clinical findings  =>Unable to Determine (no explanation of clinical findings)    The medical record reflects the following clinical findings, treatment, and risk factors:    Risk Factors: HX of CKD  Clinical Indicators: GFR 51   BUN: 35   Creatinine: 1.21 (consistent with baseline)  Treatment: lab monitoring    Please clarify and document your clinical opinion in the progress notes and discharge summary including the definitive and/or presumptive diagnosis, (suspected or probable), related to the above clinical findings. Please include clinical findings supporting your diagnosis.     Thank you,         Rosmery Bradley [facility]

## 2017-12-12 NOTE — PROGRESS NOTES
Problem: Discharge Planning  Goal: *Discharge to safe environment  Outcome: Progressing Towards Goal  Disposition Needs: Patient to return to [facility] at discharge. CM submitted updates to facility via [facility]. Mode of transport at time of discharge will be by wheelchair Adri Stable per patient's daughter. CM to contact Anastasia Richards at [facility] to arrange for patient transport at discharge. CM will continue to follow and assist with disposition needs as they arise.      YONI Higuera/MICHELLE  3:22 PM

## 2017-12-12 NOTE — INTERDISCIPLINARY ROUNDS
IDR/SLIDR Summary          Patient: Tisha Barrios MRN: [id number]    Age: 80 y.o. YOB: 1928 Room/Bed: [facility]   Admit Diagnosis: Dehydration  Acute hyperkalemia  Anemia  Generalized weakness  Laceration of head  Principal Diagnosis: Dehydration   Goals: home  Readmission: NO  Quality Measure: Not applicable  VTE Prophylaxis: Mechanical  Influenza Vaccine screening completed? NO  Pneumococcal Vaccine screening completed? NO  Mobility needs: Yes   Nutrition plan:Yes  Consults: P. T and O.T. Financial concerns:No  Escalated to CM? YES  Testing due for pt today?  NO  LOS: 1 days Expected length of stay 5 days  Discharge plan: home   PCP: Miguel Alvarado MD  Transportation needs: No    Days before discharge:two or more days before discharge   Discharge disposition: Nursing Home    Signed:     Jaime Marshall  12/12/2017  2:22 AM

## 2017-12-12 NOTE — PROGRESS NOTES
Hospitalist Progress Note  Napoleon Mcmahan NP  Answering service: [phone] from in house phone  Cell: [phone]   Date of Service:  2017  NAME:  Annette Knight  :  1928  MRN:  [id number]    Admission Summary:   Pt presented to the ED from [facility] via EMS s/p ground level fall with head laceration and chief complaint of headache. Per collective reports, patient fell ~ 1 hour PTA. Mechanism of the fall is not certain: was reportedly walking and fell on a carpeted surface, hitting her head. No reported loss of consciousness. Interval history / Subjective:   Pt in bed, no new complaints. Oriented to person, family, place. She denies any pain - specifically asked about her left hip due to reports of pain there by overnight nurses/pts family. Assessment & Plan:     Dehydration:   - Pt given fluid bolus followed by continuous IVF. - Repeat renal panel after noted interventions.     Acute hyperkalemia:    - IVF hydration.   - was given Kayexalate in ED, K down to 5.0      Laceration of head/ scalp: s/p wound suture repair in the ED  - CT head and c spine negative for active process.     Fall: Etiology uncertain.    - TSH ordered  - Orthostatic vital signs ordered     Generalized weakness: Consult PT/OT     Anemia: Check iron profile.      Hx Parkinson's disease: Resume home meds     Hx Hypertension: Resume home meds    Hx Chronic Kidney Disease (stage 3): has a solitary kidney and followed by Kamila Grider     Code status: Full  DVT prophylaxis: SCDs  Care Plan discussed with: patient, patients daughter and nurse  Disposition: Back to the [facility] tomorrow     Hospital Problems  Date Reviewed: 2017          Codes Class Noted POA    * (Principal)Dehydration ICD-10-CM: E86.0  ICD-9-CM: 276.51  2017 Unknown        Acute hyperkalemia ICD-10-CM: E87.5  ICD-9-CM: 276.7  2017 Unknown Generalized weakness ICD-10-CM: R53.1  ICD-9-CM: 780.79  12/11/2017 Unknown        Laceration of head ICD-10-CM: S01. 91XA  ICD-9-CM: 873.8  12/11/2017 Unknown        Anemia ICD-10-CM: D64.9  ICD-9-CM: 285.9  1/19/2012 Unknown            Review of Systems:   Denies any current HA. No chest pain or pressure. No respiratory or GI complaints. Vital Signs:    Last 24hrs VS reviewed since prior progress note. Most recent are:  Visit Vitals    /64    Pulse 62    Temp 97.1 °F (36.2 °C)    Resp 16    Ht 5' 4\" (1.626 m)    Wt 60.2 kg (132 lb 11.2 oz)    SpO2 98%    BMI 22.78 kg/m2       Intake/Output Summary (Last 24 hours) at 12/12/17 1229  Last data filed at 12/12/17 0200   Gross per 24 hour   Intake           281.25 ml   Output              350 ml   Net           -68.75 ml      Physical Examination:        Constitutional:  No acute distress, cooperative, pleasant    ENT:  Oral mucous moist, oropharynx benign. Resp:  CTA bilaterally. No wheezing. RA    CV:  Regular rhythm, normal rate. SR on tele. GI:  Soft, non distended, non tender. Normoactive bowel sounds    Musculoskeletal:  No edema, warm, 2+ pulses throughout. NO complaints of left hip pain when leg manipulated. Neurologic:  Moves all extremities. AAOx2      Data Review:   Review and/or order of clinical lab test  Review and/or order of tests in the radiology section of CPT  Review and/or order of tests in the medicine section of CPT    Labs:     Recent Labs      12/12/17   0032  12/11/17   1801   WBC  5.8  5.3   HGB  9.4*  9.9*   HCT  28.5*  31.1*   PLT  193  195     Recent Labs      12/12/17   0032  12/11/17   1801   NA  143  137   K  5.0  5.5*   CL  112*  109*   CO2  24  22   BUN  30*  35*   CREA  1.11*  1.21*   GLU  71  81   CA  8.6  9.0     No results for input(s): SGOT, GPT, ALT, AP, TBIL, TBILI, TP, ALB, GLOB, GGT, AML, LPSE in the last 72 hours.     No lab exists for component: AMYP, HLPSE  No results for input(s): INR, PTP, APTT in the last 72 hours. No lab exists for component: INREXT   No results for input(s): FE, TIBC, PSAT, FERR in the last 72 hours. No results found for: FOL, RBCF   No results for input(s): PH, PCO2, PO2 in the last 72 hours.   Recent Labs      12/11/17   1801   TROIQ  <0.04     Lab Results   Component Value Date/Time    Cholesterol, total 210 12/12/2017 12:32 AM    HDL Cholesterol 63 12/12/2017 12:32 AM    LDL, calculated 133.4 12/12/2017 12:32 AM    Triglyceride 68 12/12/2017 12:32 AM    CHOL/HDL Ratio 3.3 12/12/2017 12:32 AM     Lab Results   Component Value Date/Time    Glucose (POC) 87 03/09/2014 05:14 PM    Glucose (POC) 65 03/04/2014 08:41 AM     Lab Results   Component Value Date/Time    Color YELLOW/STRAW 12/11/2017 06:01 PM    Appearance CLEAR 12/11/2017 06:01 PM    Specific gravity 1.010 12/11/2017 06:01 PM    pH (UA) 6.5 12/11/2017 06:01 PM    Protein 100 12/11/2017 06:01 PM    Glucose NEGATIVE  12/11/2017 06:01 PM    Ketone NEGATIVE  12/11/2017 06:01 PM    Bilirubin NEGATIVE  12/11/2017 06:01 PM    Urobilinogen 0.2 12/11/2017 06:01 PM    Nitrites NEGATIVE  12/11/2017 06:01 PM    Leukocyte Esterase NEGATIVE  12/11/2017 06:01 PM    Epithelial cells FEW 12/11/2017 06:01 PM    Bacteria 2+ 12/11/2017 06:01 PM    WBC 0-4 12/11/2017 06:01 PM    RBC 0-5 12/11/2017 06:01 PM     Medications Reviewed:     Current Facility-Administered Medications   Medication Dose Route Frequency    amLODIPine (NORVASC) tablet 5 mg  5 mg Oral DAILY    hydrALAZINE (APRESOLINE) tablet 25 mg  25 mg Oral BID    lovastatin (MEVACOR) tablet 20 mg  20 mg Oral QHS    docusate (COLACE) 50 mg/5 mL oral liquid 50 mg  50 mg Oral DAILY    acetaminophen (TYLENOL) tablet 500 mg  500 mg Oral Q6H PRN    carbidopa-levodopa (SINEMET)  mg per tablet 2 Tab  2 Tab Oral QID    0.9% sodium chloride infusion  125 mL/hr IntraVENous CONTINUOUS    sodium chloride (NS) flush 5-10 mL  5-10 mL IntraVENous Q8H    sodium chloride (NS) flush 5-10 mL  5-10 mL IntraVENous PRN   ______________________________________________________________________  EXPECTED LENGTH OF STAY: 2d 14h  ACTUAL LENGTH OF STAY:          [address] E, NP

## 2017-12-12 NOTE — CDMP QUERY
#2    Please clarify if this patient is being treated/managed for:    =>Likely Chronic Systolic CHF in the setting of EF 45% requiring apresoline   =>Other Explanation of clinical findings  =>Unable to Determine (no explanation of clinical findings)    The medical record reflects the following clinical findings, treatment, and risk factors:    Risk Factors: HX of low EF  Clinical Indicators: 8/20/2014 echo  Left ventricle: Systolic function was mildly reduced. Ejection fraction  was estimated to be 45 %. There were no regional wall motion  abnormalities. Wall thickness was mildly increased. Left atrium: The atrium was mildly dilated. Treatment: apresoline    Please clarify and document your clinical opinion in the progress notes and discharge summary including the definitive and/or presumptive diagnosis, (suspected or probable), related to the above clinical findings. Please include clinical findings supporting your diagnosis.     Thank you,         Isaiah Mendoza [facility]

## 2017-12-12 NOTE — PROGRESS NOTES
Problem: Self Care Deficits Care Plan (Adult)  Goal: *Acute Goals and Plan of Care (Insert Text)  Occupational Therapy Goals  Initiated 12/12/2017  1. Patient will perform grooming with modified independence within 7 day(s). 2.  Patient will perform upper body dressing with supervision/set-up within 7 day(s). 3.  Patient will perform lower body dressing with minimal assistance/contact guard assist within 7 day(s). 4.  Patient will perform toilet transfers with supervision/set-up within 7 day(s). 5.  Patient will perform all aspects of toileting with supervision/set-up within 7 day(s). 6.  Patient will participate in upper extremity therapeutic exercise/activities with independence for 5 minutes within 7 day(s). 7.  Patient will utilize energy conservation techniques during functional activities with verbal cues within 7 day(s). Occupational Therapy EVALUATION  Patient: Foy Osler (62 y.o. female)  Date: 12/12/2017  Primary Diagnosis: Dehydration  Acute hyperkalemia  Anemia  Generalized weakness  Laceration of head        Precautions:   Fall    ASSESSMENT :  Based on the objective data described below, the patient presents with decreased strength, balance, activity tolerance and need for assist with ADLs, s/p admitted post GLF, currently Min A to CGA with RW for support for mobility, slow but controlled mobility with RW, minimal parkinsonian deficits noted, required assist with all toileting needs due to fair balance, patient oriented to self, situation, and place, stating she was getting therapy at [facility] . Recommend discharge back to SNF with therapy, will follow 3x week for OT to maximize independence with ADLs. Patient will benefit from skilled intervention to address the above impairments.   Patients rehabilitation potential is considered to be Good  Factors which may influence rehabilitation potential include:   []             None noted  [x]             Mental ability/status  [] Medical condition  []             Home/family situation and support systems  []             Safety awareness  []             Pain tolerance/management  []             Other:      PLAN :  Recommendations and Planned Interventions:  [x]               Self Care Training                  [x]        Therapeutic Activities  [x]               Functional Mobility Training    []        Cognitive Retraining  [x]               Therapeutic Exercises           [x]        Endurance Activities  [x]               Balance Training                   []        Neuromuscular Re-Education  []               Visual/Perceptual Training     [x]   Home Safety Training  [x]               Patient Education                 []        Family Training/Education  []               Other (comment):    Frequency/Duration: Patient will be followed by occupational therapy 3 times a week to address goals. Discharge Recommendations: Skilled Nursing Facility  Further Equipment Recommendations for Discharge: TBD at [id number], has wheelchair     SUBJECTIVE:   Patient stated Albertina Shi have been helping me with my dressing.     OBJECTIVE DATA SUMMARY:   HISTORY:   Past Medical History:   Diagnosis Date    Arthritis     Chronic kidney disease     RENAL MASS    Environmental allergies     HTN (hypertension) 1/19/2012    Hyperlipemia 1/19/2012    Hypertension     Other ill-defined conditions(799.89)     parkinson disease    Parkinson disease ([facility] [address] 1/19/2012    S/P radioactive iodine thyroid ablation 1/19/2012    S/P NINI-BSO 1/19/2012    S/P TKR (total knee replacement) 1/19/2012    Thyroid disease     goiter/hyperthyroid (s/p iodine tx 1999)    Unspecified adverse effect of anesthesia     CLAUSTROPHOBIA     Past Surgical History:   Procedure Laterality Date    ENDOSCOPY, COLON, DIAGNOSTIC  7/2011    (Abou-Assi)    HX GI      COLONOSCOPY    HX ORTHOPAEDIC      r hip, l knee replacement 2008    HX NINI AND BSO  1946       Prior Level of Function/Environment/Context: [facility], was receiving therapy and assist with all ADLs per patient, using wheelchair and RW  Occupations in which the patient is/was successful, what are the barriers preventing that success: GLF, soreness  Performance Patterns (routines, roles, habits, and rituals):   Personal Interests and/or values:   Expanded or extensive additional review of patient history: Parkinsons, Dementia, HTN    Home Situation  Home Environment: Skilled nursing facility  One/Two Story Residence: Other (Comment)  Living Alone: No  Support Systems: Skilled nursing facility, Family member(s)  Patient Expects to be Discharged to[de-identified] Skilled nursing facility  Current DME Used/Available at Home: Walker, rolling, Wheelchair  [x]  Right hand dominant   []  Left hand dominant    EXAMINATION OF PERFORMANCE DEFICITS:  Cognitive/Behavioral Status:  Neurologic State: Alert;Confused  Orientation Level: Oriented to person  Cognition: Follows commands  Perception: Appears intact  Perseveration: No perseveration noted  Safety/Judgement: Awareness of environment;Good awareness of safety precautions    Skin: intact    Edema: none noted    Hearing: Auditory  Auditory Impairment: None    Vision/Perceptual:                           Acuity: Within Defined Limits         Range of Motion:  B UE, L shoulder limited due to soreness post fall  AROM: Generally decreased, functional                         Strength:  B UE 3/5-4/5, soreness limiting  Strength: Generally decreased, functional                Coordination:  Coordination: Within functional limits  Fine Motor Skills-Upper: Left Intact; Right Intact         Tone & Sensation:    Tone: Normal  Sensation: Intact                      Balance:  Sitting: Intact  Standing: Impaired  Standing - Static: Good;Constant support  Standing - Dynamic : Fair    Functional Mobility and Transfers for ADLs:  Bed Mobility:  Supine to Sit: Additional time;Contact guard assistance    Transfers:  Sit to Stand: Additional time;Contact guard assistance  Stand to Sit: Additional time;Contact guard assistance  Toilet Transfer : Contact guard assistance    ADL Assessment:  Feeding: Setup    Oral Facial Hygiene/Grooming: Setup    Bathing: Moderate assistance    Upper Body Dressing: Minimum assistance    Lower Body Dressing: Moderate assistance    Toileting: Moderate assistance                ADL Intervention and task modifications:         Completed OT evaluation and ADLs seated EOB and standing as able with RW for balance. Educated on safety and endurance training with encouragement for full participation in ADLs while in hospital. Good understanding noted. Patient instructed and indicated understanding the benefits of maintaining activity tolerance, functional mobility, and independence with self care tasks during acute stay  to ensure safe return home and to baseline. Encouraged patient to increase frequency and duration OOB, be out of bed for all meals, perform daily ADLs (as approved by RN/MD regarding bathing etc), and performing functional mobility to/from bathroom. Cognitive Retraining  Safety/Judgement: Awareness of environment;Good awareness of safety precautions    Therapeutic Exercise:  encouraged OOB and full participation with ADLs to improve strength and endurance. Functional Measure:  Barthel Index:    Bathin  Bladder: 5  Bowels: 5  Groomin  Dressin  Feeding: 10  Mobility: 0  Stairs: 0  Toilet Use: 5  Transfer (Bed to Chair and Back): 5  Total: 40       Barthel and G-code impairment scale:  Percentage of impairment CH  0% CI  1-19% CJ  20-39% CK  40-59% CL  60-79% CM  80-99% CN  100%   Barthel Score 0-100 100 99-80 79-60 59-40 20-39 1-19   0   Barthel Score 0-20 20 17-19 13-16 9-12 5-8 1-4 0      The Barthel ADL Index: Guidelines  1.  The index should be used as a record of what a patient does, not as a record of what a patient could do. 2. The main aim is to establish degree of independence from any help, physical or verbal, however minor and for whatever reason. 3. The need for supervision renders the patient not independent. 4. A patient's performance should be established using the best available evidence. Asking the patient, friends/relatives and nurses are the usual sources, but direct observation and common sense are also important. However direct testing is not needed. 5. Usually the patient's performance over the preceding 24-48 hours is important, but occasionally longer periods will be relevant. 6. Middle categories imply that the patient supplies over 50 per cent of the effort. 7. Use of aids to be independent is allowed. Celeste Mendez., Barthel, DTaylorW. (1543). Functional evaluation: the Barthel Index. [address] [facility] (14)2. Sandria Cogan [place], J.J.M.F, Libia Mccarthy, Nancy Woods, [place], [facility] (1999). Measuring the change indisability after inpatient rehabilitation; comparison of the responsiveness of the Barthel Index and Functional Powell Measure. Journal of Neurology, Neurosurgery, and Psychiatry, 66(4), 480-989. Jaylen Evans, N.J.A, SARAH Hemphill, & Federico Nunez, M.A. (2004.) Assessment of post-stroke quality of life in cost-effectiveness studies: The usefulness of the Barthel Index and the EuroQoL-5D. Quality of Life Research, 13, 664-69       G codes: In compliance with CMSs Claims Based Outcome Reporting, the following G-code set was chosen for this patient based on their primary functional limitation being treated: The outcome measure chosen to determine the severity of the functional limitation was the barthal with a score of 40/100 which was correlated with the impairment scale. ?  Self Care:     - CURRENT STATUS: CK - 40%-59% impaired, limited or restricted    - GOAL STATUS: CJ - 20%-39% impaired, limited or restricted    - D/C STATUS:  ---------------To be determined---------------     Occupational Therapy Evaluation Charge Determination   History Examination Decision-Making   MEDIUM Complexity : Expanded review of history including physical, cognitive and psychosocial  history  MEDIUM Complexity : 3-5 performance deficits relating to physical, cognitive , or psychosocial skils that result in activity limitations and / or participation restrictions MEDIUM Complexity : Patient may present with comorbidities that affect occupational performnce. Miniml to moderate modification of tasks or assistance (eg, physical or verbal ) with assesment(s) is necessary to enable patient to complete evaluation       Based on the above components, the patient evaluation is determined to be of the following complexity level: MEDIUM  Pain:  Pain Scale 1: Numeric (0 - 10)  Pain Intensity 1: 0              Activity Tolerance:   Fair,. BP stable, slight decrease with standing  Please refer to the flowsheet for vital signs taken during this treatment. After treatment:   [x] Patient left in no apparent distress sitting up in chair  [] Patient left in no apparent distress in bed  [x] Call bell left within reach  [x] Nursing notified  [] Caregiver present  [x] Bed alarm activated-chair    COMMUNICATION/EDUCATION:   The patients plan of care was discussed with: Physical Therapist and Registered Nurse. [x] Home safety education was provided and the patient/caregiver indicated understanding. [x] Patient/family have participated as able in goal setting and plan of care. [x] Patient/family agree to work toward stated goals and plan of care. [] Patient understands intent and goals of therapy, but is neutral about his/her participation. [] Patient is unable to participate in goal setting and plan of care. This patients plan of care is appropriate for delegation to [facility].     Thank you for this referral.  Danyelle Miller, OT  Time Calculation: 31 mins

## 2017-12-12 NOTE — PROGRESS NOTES
Assumed care of pt this am. Pt is alert but not fully oriented at this time. Per daughter this is baseline for pt. NP asked to review pt PTA meds and order what pt may need for stay. Pt complaining of left hip pain, no x ray on file, will discuss with NP. Pt has fluids running at 125 ml/hr, pt appears to be tolerating this. Pt has a known history of partial nephrectomy, with an EF of 45% ( 2014), will discuss with NP. NP notified.

## 2017-12-12 NOTE — PROGRESS NOTES
Brief:    Diagnosis to include:    · Likely Chronic Systolic CHF in the setting of EF 45% requiring apresoline PTA     Will change Norvasc to  po coreg and Cozaar ( low dose in setting of CKD 3 and mariluz)     Additional dx :    · Chronic Kidney Disease Stage 3 (GFR 30-59) in the setting of know history requiring monitoring     Jesica Gupta MD 12/12/2017

## 2017-12-12 NOTE — ED NOTES
Bedside and Verbal shift change report received from Debroah Stable, [place] (offgoing nurse). Report included the following information SBAR, ED Summary, MAR and Recent Results.

## 2017-12-12 NOTE — PROGRESS NOTES
Problem: Mobility Impaired (Adult and Pediatric)  Goal: *Acute Goals and Plan of Care (Insert Text)  Physical Therapy Goals  Initiated 12/12/2017  1. Patient will move from supine to sit and sit to supine  and scoot up and down in bed with supervision/set-up within 7 day(s). 2.  Patient will transfer from bed to chair and chair to bed with supervision/set-up using the least restrictive device within 7 day(s). 3.  Patient will perform sit to stand with supervision/set-up within 7 day(s). 4.  Patient will ambulate with supervision/set-up for 200 feet with the least restrictive device within 7 day(s). physical Therapy EVALUATION  Patient: Jeremy Flores (31 y.o. female)  Date: 12/12/2017  Primary Diagnosis: Dehydration  Acute hyperkalemia  Anemia  Generalized weakness  Laceration of head        Precautions:   Fall    ASSESSMENT :  Based on the objective data described below, the patient presents with impaired functional mobility as compared to baseline level 2* generalized decreased functional strength, impaired gait, intermittent confusion, and impaired balance following admission for GLF and dehydration. Prior to admission, pt reports that she lived at [address] [place] [address] and received assist with all ADLs and mobility however was ambulatory with RW and assist of 1. Pt was cleared for mobilization by RN. Received supine in bed - agreeable to participation. She was able to mobilize to EOB with additional time and up to CGA at trunk from flat bed. Demos good sitting balance w/o c/o dizziness (orstatics assessed and were negative). Completed multiple sit<>stands from various surfaces with CGA and cues for hand placement sequencing. Gait training progressed to approx 100ft with RW and CGA - significant R LE internal rotation noted (reports baseline, no c/o pain) with decreased bobbi, decreased foot clearance, and decreased step lengths - no overt LOB or difficulty.  Pt remained up in chair at end of session in NAD, chair alarm on. Anticipate that pt is near her baseline level. Recommend discharge back to SNF once medically cleared. Recommend with nursing patient to complete as able in order to maintain strength, endurance and independence: OOB to chair 3x/day with assist x1 and ambulating with assist x1 and RW for safety. Thank you for your assistance. Patient will benefit from skilled intervention to address the above impairments. Patients rehabilitation potential is considered to be Good  Factors which may influence rehabilitation potential include:   [x]         None noted  []         Mental ability/status  []         Medical condition  []         Home/family situation and support systems  []         Safety awareness  []         Pain tolerance/management  []         Other:      PLAN :  Recommendations and Planned Interventions:  [x]           Bed Mobility Training             [x]    Neuromuscular Re-Education  [x]           Transfer Training                   []    Orthotic/Prosthetic Training  [x]           Gait Training                         []    Modalities  [x]           Therapeutic Exercises           []    Edema Management/Control  [x]           Therapeutic Activities            [x]    Patient and Family Training/Education  []           Other (comment):    Frequency/Duration: Patient will be followed by physical therapy  3 times a week to address goals. Discharge Recommendations: Skilled Nursing Facility  Further Equipment Recommendations for Discharge: None     SUBJECTIVE:   Patient stated I have to pee.     OBJECTIVE DATA SUMMARY:   HISTORY:    Past Medical History:   Diagnosis Date    Arthritis     Chronic kidney disease     RENAL MASS    Environmental allergies     HTN (hypertension) 1/19/2012    Hyperlipemia 1/19/2012    Hypertension     Other ill-defined conditions(799.89)     parkinson disease    [facility]    S/P radioactive iodine thyroid ablation 1/19/2012    S/P NINI-BSO 1/19/2012    S/P TKR (total knee replacement) 1/19/2012    Thyroid disease     goiter/hyperthyroid (s/p iodine tx 1999)    Unspecified adverse effect of anesthesia     CLAUSTROPHOBIA     Past Surgical History:   Procedure Laterality Date    ENDOSCOPY, COLON, DIAGNOSTIC  7/2011    (Abou-Assi)    HX GI      COLONOSCOPY    HX ORTHOPAEDIC      r hip, l knee replacement 2008    HX NINI AND BSO  1946     Prior Level of Function/Home Situation: Lives at [facility] and has assist for ADLs and mobility-  Ambulatory with assist x1 and RW  Personal factors and/or comorbidities impacting plan of care:     Home Situation  Home Environment: Skilled nursing facility  One/Two Story Residence: Other (Comment)  Living Alone: No  Support Systems: Skilled nursing facility, Family member(s)  Patient Expects to be Discharged to[de-identified] Skilled nursing facility  Current DME Used/Available at Home: Santa Burton, rolling, Wheelchair    EXAMINATION/PRESENTATION/DECISION MAKING:   Critical Behavior:  Neurologic State: Alert, Confused  Orientation Level: Oriented to person  Cognition: Follows commands  Safety/Judgement: Awareness of environment, Good awareness of safety precautions  Hearing: Auditory  Auditory Impairment: None  Skin:  Small head lac with sutures in place  Edema: none noted  Range Of Motion:  AROM: Generally decreased, functional     Strength:    Strength: Generally decreased, functional      Tone & Sensation:   Tone: Normal  Sensation: Intact       Coordination:  Coordination: Within functional limits  Vision:   Functional Mobility:  Bed Mobility:     Supine to Sit: Additional time;Contact guard assistance     Transfers:  Sit to Stand:  Additional time;Contact guard assistance  Stand to Sit: Additional time;Contact guard assistance     Balance:   Sitting: Intact  Standing: Impaired  Standing - Static: Good;Constant support  Standing - Dynamic : Fair  Ambulation/Gait Training:  Distance (ft): 100 Feet (ft)  Assistive Device: Gait belt;Walker, rolling  Ambulation - Level of Assistance: Contact guard assistance   Gait Description (WDL): Exceptions to WDL  Gait Abnormalities: Decreased step clearance;Shuffling gait (R LE internally rotated)  Base of Support: Narrowed; Shift to left  Speed/Jada: Slow  Step Length: Right shortened;Left shortened        Functional Measure:  Tinetti test:    Sitting Balance: 1  Arises: 1  Attempts to Rise: 2  Immediate Standing Balance: 1  Standing Balance: 1  Nudged: 0  Eyes Closed: 0  Turn 360 Degrees - Continuous/Discontinuous: 0  Turn 360 Degrees - Steady/Unsteady: 1  Sitting Down: 1  Balance Score: 8  Indication of Gait: 1  R Step Length/Height: 1  L Step Length/Height: 1  R Foot Clearance: 1  L Foot Clearance: 1  Step Symmetry: 1  Step Continuity: 1  Path: 1  Trunk: 0  Walking Time: 1  Gait Score: 9  Total Score: 17       Tinetti Test and G-code impairment scale:  Percentage of Impairment CH    0%   CI    1-19% CJ    20-39% CK    40-59% CL    60-79% CM    80-99% CN     100%   Tinetti  Score 0-28 28 23-27 17-22 12-16 6-11 1-5 0       Tinetti Tool Score Risk of Falls  <19 = High Fall Risk  19-24 = Moderate Fall Risk  25-28 = Low Fall Risk  Tinetti ME. Performance-Oriented Assessment of Mobility Problems in Elderly Patients. Pelayo 66; [id number]. (Scoring Description: PT Bulletin Feb. 10, 1993)    Older adults: Delia Jaime et al, 2009; n = 1000 [facility] elderly evaluated with ABC, RON, ADL, and IADL)  · Mean RON score for males aged 69-68 years = 26.21(3.40)  · Mean RON score for females age 69-68 years = 25.16(4.30)  · Mean RON score for males over 80 years = 23.29(6.02)  · Mean RON score for females over 80 years = 17.20(8.32)         G codes: In compliance with CMSs Claims Based Outcome Reporting, the following G-code set was chosen for this patient based on their primary functional limitation being treated:     The outcome measure chosen to determine the severity of the functional limitation was the Tinetti with a score of 17/28 which was correlated with the impairment scale. ? Mobility - Walking and Moving Around:     - CURRENT STATUS: CJ - 20%-39% impaired, limited or restricted    - GOAL STATUS: CI - 1%-19% impaired, limited or restricted    - D/C STATUS:  ---------------To be determined---------------      Physical Therapy Evaluation Charge Determination   History Examination Presentation Decision-Making   HIGH Complexity :3+ comorbidities / personal factors will impact the outcome/ POC  MEDIUM Complexity : 3 Standardized tests and measures addressing body structure, function, activity limitation and / or participation in recreation  LOW Complexity : Stable, uncomplicated  MEDIUM Complexity : FOTO score of 26-74      Based on the above components, the patient evaluation is determined to be of the following complexity level: LOW     Pain:  Pain Scale 1: Numeric (0 - 10)  Pain Intensity 1: 0        Activity Tolerance:   NAD  Please refer to the flowsheet for vital signs taken during this treatment. After treatment:   [x]         Patient left in no apparent distress sitting up in chair  []         Patient left in no apparent distress in bed  [x]         Call bell left within reach  [x]         Nursing notified  []         Caregiver present  [x]         Chair alarm activated    COMMUNICATION/EDUCATION:   The patients plan of care was discussed with: Occupational Therapist and Registered Nurse. [x]         Fall prevention education was provided and the patient/caregiver indicated understanding. [x]         Patient/family have participated as able in goal setting and plan of care. [x]         Patient/family agree to work toward stated goals and plan of care. []         Patient understands intent and goals of therapy, but is neutral about his/her participation. []         Patient is unable to participate in goal setting and plan of care.     Thank you for this referral.  Quincy Conn, PT , DPT   Time Calculation: 33 mins

## 2017-12-12 NOTE — PROGRESS NOTES
Chart Reviewed:  CM completed assessment with patient's daughter, Remy Torrez [phone]. CM verified demographics, insurance coverage, and PCP. Patient is an 80year old female with an admitting diagnosis of dehydration/syncope. Patient's medical history consist of the following: arthritis, CKD, renal mass, hypertension, hyperlipidemia, Parkinson's disease, thyroid goiter, s/p radioactive iodine ablation, DJD, s/p total knee replacement, and claustrophobia. Current Living Situation: Patient is a LTC resident at [facility] receiving assistance with all ADL's and IADL's. Medications are administered at the facility. Discharge Planning:   Patient to return to [facility] at discharge. CM submitted updates to facility via [employer]. Mode of transport at time of discharge will be by wheelchair Tess Oconnor per patient's daughter. CM to contact Ced Johnson at [employer] to arrange for patient transport at discharge. CM will continue to follow and assist with disposition needs as they arise. Care Management Interventions  PCP Verified by CM:  Yes  Palliative Care Criteria Met (RRAT>21 & CHF Dx)?: No  Mode of Transport at Discharge: [address] (CM Consult):  (There are no CM consults or needs at this time)  Discharge Durable Medical Equipment: No  Physical Therapy Consult: Yes  Occupational Therapy Consult: Yes  Speech Therapy Consult: No  Current Support Network: [address] [place] [address]  Confirm Follow Up Transport: [address] [facility] discussed with Pt/Family/Caregiver: Yes  Freedom of Choice Offered: Yes  Discharge Location  Discharge Placement: Skilled nursing facility ([facility])    Kennard Severs, MSW/MICHELLE  3:21 PM

## 2017-12-12 NOTE — ROUTINE PROCESS
TRANSFER - OUT REPORT:    Verbal report given to Sandy RN(name) on Ash Acevedo  being transferred to NSTU(unit) for routine progression of care       Report consisted of patients Situation, Background, Assessment and   Recommendations(SBAR). Information from the following report(s) SBAR, ED Summary, [facility] and Recent Results was reviewed with the receiving nurse. Lines:   Venous Access Device 12/11/17 Upper chest (subclavicular area, right (Active)        Opportunity for questions and clarification was provided.       Patient transported with:   Monitor  Tech

## 2017-12-13 VITALS
BODY MASS INDEX: 22.07 KG/M2 | DIASTOLIC BLOOD PRESSURE: 51 MMHG | HEART RATE: 59 BPM | HEIGHT: 64 IN | OXYGEN SATURATION: 96 % | RESPIRATION RATE: 17 BRPM | SYSTOLIC BLOOD PRESSURE: 114 MMHG | WEIGHT: 129.3 LBS | TEMPERATURE: 97.8 F

## 2017-12-13 PROBLEM — E86.0 DEHYDRATION: Status: RESOLVED | Noted: 2017-12-11 | Resolved: 2017-12-13

## 2017-12-13 PROBLEM — E87.5 ACUTE HYPERKALEMIA: Status: RESOLVED | Noted: 2017-12-11 | Resolved: 2017-12-13

## 2017-12-13 LAB
ANION GAP SERPL CALC-SCNC: 7 MMOL/L (ref 5–15)
BUN SERPL-MCNC: 19 MG/DL (ref 6–20)
BUN/CREAT SERPL: 22 (ref 12–20)
CALCIUM SERPL-MCNC: 8.4 MG/DL (ref 8.5–10.1)
CHLORIDE SERPL-SCNC: 111 MMOL/L (ref 97–108)
CO2 SERPL-SCNC: 24 MMOL/L (ref 21–32)
CREAT SERPL-MCNC: 0.88 MG/DL (ref 0.55–1.02)
GLUCOSE SERPL-MCNC: 85 MG/DL (ref 65–100)
HCT VFR BLD AUTO: 27.9 % (ref 35–47)
HGB BLD-MCNC: 9.1 G/DL (ref 11.5–16)
IRON SATN MFR SERPL: 33 % (ref 20–50)
IRON SERPL-MCNC: 54 UG/DL (ref 35–150)
POTASSIUM SERPL-SCNC: 4.3 MMOL/L (ref 3.5–5.1)
SODIUM SERPL-SCNC: 142 MMOL/L (ref 136–145)
TIBC SERPL-MCNC: 163 UG/DL (ref 250–450)
TSH SERPL DL<=0.05 MIU/L-ACNC: 1.5 UIU/ML (ref 0.36–3.74)

## 2017-12-13 PROCEDURE — 80048 BASIC METABOLIC PNL TOTAL CA: CPT | Performed by: NURSE PRACTITIONER

## 2017-12-13 PROCEDURE — 74011250637 HC RX REV CODE- 250/637: Performed by: INTERNAL MEDICINE

## 2017-12-13 PROCEDURE — 84443 ASSAY THYROID STIM HORMONE: CPT | Performed by: NURSE PRACTITIONER

## 2017-12-13 PROCEDURE — 36415 COLL VENOUS BLD VENIPUNCTURE: CPT | Performed by: NURSE PRACTITIONER

## 2017-12-13 PROCEDURE — 83540 ASSAY OF IRON: CPT | Performed by: NURSE PRACTITIONER

## 2017-12-13 PROCEDURE — 74011250637 HC RX REV CODE- 250/637: Performed by: NURSE PRACTITIONER

## 2017-12-13 PROCEDURE — 85018 HEMOGLOBIN: CPT | Performed by: NURSE PRACTITIONER

## 2017-12-13 PROCEDURE — 74011250636 HC RX REV CODE- 250/636: Performed by: NURSE PRACTITIONER

## 2017-12-13 RX ORDER — LOSARTAN POTASSIUM 25 MG/1
12.5 TABLET ORAL DAILY
Qty: 30 TAB | Refills: 0 | Status: SHIPPED
Start: 2017-12-14 | End: 2020-01-01

## 2017-12-13 RX ORDER — HEPARIN 100 UNIT/ML
500 SYRINGE INTRAVENOUS ONCE
Status: COMPLETED | OUTPATIENT
Start: 2017-12-13 | End: 2017-12-13

## 2017-12-13 RX ORDER — HEPARIN 100 UNIT/ML
500 SYRINGE INTRAVENOUS AS NEEDED
Status: DISCONTINUED | OUTPATIENT
Start: 2017-12-13 | End: 2017-12-13

## 2017-12-13 RX ORDER — CARVEDILOL 3.12 MG/1
3.12 TABLET ORAL 2 TIMES DAILY WITH MEALS
Qty: 60 TAB | Refills: 0 | Status: SHIPPED
Start: 2017-12-13 | End: 2020-01-01

## 2017-12-13 RX ADMIN — HYDRALAZINE HYDROCHLORIDE 25 MG: 25 TABLET ORAL at 09:24

## 2017-12-13 RX ADMIN — CARBIDOPA AND LEVODOPA 2 TABLET: 25; 100 TABLET ORAL at 09:23

## 2017-12-13 RX ADMIN — Medication 10 ML: at 07:07

## 2017-12-13 RX ADMIN — CARVEDILOL 3.12 MG: 3.12 TABLET, FILM COATED ORAL at 09:23

## 2017-12-13 RX ADMIN — AMLODIPINE BESYLATE 5 MG: 5 TABLET ORAL at 09:23

## 2017-12-13 RX ADMIN — LOSARTAN POTASSIUM 12.5 MG: 25 TABLET ORAL at 09:23

## 2017-12-13 RX ADMIN — ACETAMINOPHEN 500 MG: 500 TABLET, FILM COATED ORAL at 09:23

## 2017-12-13 RX ADMIN — CARBIDOPA AND LEVODOPA 2 TABLET: 25; 100 TABLET ORAL at 12:40

## 2017-12-13 RX ADMIN — SODIUM CHLORIDE, PRESERVATIVE FREE 500 UNITS: 5 INJECTION INTRAVENOUS at 15:18

## 2017-12-13 NOTE — PROGRESS NOTES
11:47 AM  CM received notification that patient is medically stable for discharge. CM informed to schedule transport for today between Patient to discharge to The [address]. Facility able to accept and accommodate patient for services. CM notified The [address] [place] [address] of patient discharge via [employer] and contacted facility by phone [phone] and left a message with admissions team requesting a return call. CM to continue to follow-up and assist with disposition needs as they arise. 1:25 PM  CM received update from The [address] [place] [address] via [employer]. CM notified for RN to call report to [phone]. CM faxed discharge summary to [phone]. Patient will be going to room 108b. CM to arrange for patient transport. CM contacted Lynnette Molina [phone] with [facility] to obtain Medicaid Transport contact information. CM informed to utilize The "[employer] [phone] with [facility]. CM contacted The "[employer] CM spoke with Tyson Mix. Trip Confirmation is # [id number]. Transport scheduled for 3:30 pm today. RN notified of transport time.     YONI Michelle/MICHELLE

## 2017-12-13 NOTE — PROGRESS NOTES
IDR/SLIDR Summary          Patient: Nalini Goodwin MRN: [id number]    Age: 80 y.o. YOB: 1928 Room/Bed: [facility]   Admit Diagnosis: Dehydration  Acute hyperkalemia  Anemia  Generalized weakness  Laceration of head  Principal Diagnosis: Dehydration   Goals: Hydration  Readmission: NO  Quality Measure: CHF  VTE Prophylaxis: Mechanical  Influenza Vaccine screening completed? YES  Pneumococcal Vaccine screening completed? YES  Mobility needs: Yes   Nutrition plan:Yes  Consults:P.T, O.T. and Case Management    Financial concerns:No  Escalated to CM? NO  RRAT Score: 16   Interventions:  Testing due for pt today?  NO  LOS: 2 days Expected length of stay 2 or more days  Discharge plan: TBD   PCP: Jil Moss MD  Transportation needs: Yes    Days before discharge:two or more days before discharge   Discharge disposition: TBD    Signed:     Ailyn Wolfe RN  12/13/2017   0900 AM

## 2017-12-13 NOTE — PROGRESS NOTES
Discharge medications reviewed with patient and appropriate educational materials and side effects teaching were provided. I have reviewed discharge instructions with the patient. The patient verbalized understanding. Patient's Port A Cath de-accessed prior to discharge. Report called to [facility] and given to Rehana. Patient discharged with personal belongings. Patient wearing her upper and lower dentures. Patient discharged with printed copy of AVS, Kardex, MAR and CM paperwork. Patient did not complain of any pain prior to discharge. Patient's vitals are all WNL. [facility] [address] at 4 pm. Discharge paper work and follow up appointments (dates and times) discussed with accepting nurse Rehana.

## 2017-12-13 NOTE — PROGRESS NOTES
Problem: Falls - Risk of  Goal: *Absence of Falls  Document Cecy Fall Risk and appropriate interventions in the flowsheet. Outcome: Progressing Towards Goal  Fall Risk Interventions:  Mobility Interventions: Communicate number of staff needed for ambulation/transfer, Patient to call before getting OOB, PT Consult for mobility concerns, PT Consult for assist device competence, Strengthening exercises (ROM-active/passive)    Mentation Interventions: Adequate sleep, hydration, pain control, Evaluate medications/consider consulting pharmacy, Increase mobility, More frequent rounding, Reorient patient    Medication Interventions: Evaluate medications/consider consulting pharmacy, Patient to call before getting OOB, Teach patient to arise slowly    Elimination Interventions: Call light in reach, Patient to call for help with toileting needs, Toileting schedule/hourly rounds    History of Falls Interventions: Evaluate medications/consider consulting pharmacy, Investigate reason for fall        Problem: Pressure Injury - Risk of  Goal: *Prevention of pressure ulcer  Outcome: Progressing Towards Goal  Patient instructed on importance of repositioning while in bed to avoid the development of skin breakdown.

## 2017-12-13 NOTE — DISCHARGE SUMMARY
Discharge Summary     PATIENT ID: Alexa Newton  MRN: [id number]   YOB: 1928    DATE OF ADMISSION: 12/11/2017  5:15 PM    DATE OF DISCHARGE: 12/13/2017   PRIMARY CARE PROVIDER: Gail Cunningham MD   ATTENDING PHYSICIAN: Helen Soria MD  DISCHARGING PROVIDER: Josias Marino NP. To contact this individual call [phone] and ask the  to page. If unavailable ask to be transferred the Adult Hospitalist Department. CONSULTATIONS: IP CONSULT TO HOSPITALIST    ADMITTING [address] [place] [address] COURSE:   Pt presented to the ED from The [address] [place] [address] via EMS s/p ground level fall with head laceration and chief complaint of headache.  Per collective reports, patient fell ~ 1 hour PTA.  Mechanism of the fall is not certain: was reportedly walking and fell on a carpeted surface, hitting her head. No reported loss of consciousness.     DISCHARGE DIAGNOSES / PLAN:      Dehydration: Resolved  - Pt given fluid bolus followed by continuous IVF.     Acute hyperkalemia:  Resolved      Laceration of head/scalp: s/p wound suture repair in the ED  - CT head and c spine negative for active process. - remove sutures in 10-14 days     Fall: Etiology uncertain.    - TSH normal  - Orthostatic vital signs ordered prior to discharge     Generalized weakness: Consult PT/OT      Anemia: Iron profile reviewed      Hx Parkinson's disease: Resume home meds      Hx Hypertension: Resume home meds     Hx CKD (stage 3):  Has a solitary kidney and followed by Fran Martinez        FOLLOW UP APPOINTMENTS:    Follow-up Information     Follow up With Details Comments Contact Info    Gail Cunningham MD In 2 weeks or with facility provider [address] [place] [address]  [phone]           ADDITIONAL CARE RECOMMENDATIONS:   - check blood pressures daily (BP medications changed this admit)    - remove sutures, 10-14 days    DIET: Cardiac Diet  ACTIVITY: Activity as tolerated  EQUIPMENT needed: as per PT/OT    DISCHARGE MEDICATIONS:  Current Discharge Medication List      START taking these medications    Details   carvedilol (COREG) 3.125 mg tablet Take 1 Tab by mouth two (2) times daily (with meals). Qty: 60 Tab, Refills: 0      losartan (COZAAR) 25 mg tablet Take 0.5 Tabs by mouth daily. Qty: 30 Tab, Refills: 0         CONTINUE these medications which have NOT CHANGED    Details   ondansetron hcl (ZOFRAN) 4 mg tablet Take 4 mg by mouth every eight (8) hours as needed for Nausea. OTHER Rollator Walker with seat  icd 10 - G20  Qty: 1 Each, Refills: 0      hydrALAZINE (APRESOLINE) 25 mg tablet TAKE 1 TABLET BY MOUTH TWICE DAILY  Qty: 180 Tab, Refills: 0      lovastatin (MEVACOR) 20 mg tablet TAKE 1 TABLET BY MOUTH EVERY DAY  Qty: 90 Tab, Refills: 1      docusate sodium (COLACE) 50 mg capsule Take 50 mg by mouth daily. acetaminophen (TYLENOL) 500 mg tablet Take 500 mg by mouth every six (6) hours as needed for Pain. carbidopa-levodopa (SINEMET)  mg per tablet Take 2 Tabs by mouth four (4) times daily. multivitamin with iron tablet Take 1 Tab by mouth daily. Clancy Vitamins         STOP taking these medications       amLODIPine (NORVASC) 5 mg tablet Comments:   Reason for Stopping:             NOTIFY YOUR PHYSICIAN FOR ANY OF THE FOLLOWING:   Fever over 101 degrees for 24 hours. Chest pain, shortness of breath, fever, chills, nausea, vomiting, diarrhea, change in mentation, falling, weakness, bleeding. Severe pain or pain not relieved by medications. Or, any other signs or symptoms that you may have questions about.     DISPOSITION:    Home With:   OT  PT  HH  RN      xx Long term SNF/Inpatient Rehab    Independent/assisted living    Hospice    Other:     PATIENT CONDITION AT DISCHARGE:   Functional status    Poor    xx Deconditioned     Independent      Cognition     Lucid     Forgetful    xx Dementia      Catheters/lines (plus indication)    Trejo     PICC     PEG    xx None Code status   xx  Full code     DNR      PHYSICAL EXAMINATION AT DISCHARGE:  /51  Pulse (!) 59  Temp 97.8 °F (36.6 °C)  Resp 17  Ht 5' 4\" (1.626 m)  Wt 58.7 kg (129 lb 4.8 oz)  SpO2 96%  BMI 22.19 kg/m2    Pt in bed, awaken and eating. No new complaints. Called her daughter, Sandhya Hopper, and discussed discharge planning back to [facility] later on today. Constitutional:  No acute distress, cooperative, pleasant    ENT:  Oral mucous moist, oropharynx benign. Resp:  CTA bilaterally. No wheezing. RA    CV:  Regular rhythm, normal rate. SR on tele. GI:  Soft, non distended, non tender. Normoactive bowel sounds. Musculoskeletal:  No edema, warm, 2+ pulses throughout. Neurologic:  Moves all extremities. AAOx2. Skin: Scattered bruises. Has small head laceration to R head. Sutures intact and non-draining.       CHRONIC MEDICAL DIAGNOSES:  Problem List as of 12/13/2017  Date Reviewed: 12/13/2017          Codes Class Noted - Resolved    Generalized weakness ICD-10-CM: R53.1  ICD-9-CM: 780.79  12/11/2017 - Present        Laceration of head ICD-10-CM: S01. 91XA  ICD-9-CM: 873.8  12/11/2017 - Present        Parkinson's disease dementia ([facility] ICD-10-CM: G20, F02.80  ICD-9-CM: 332.0, 294.10  5/25/2016 - Present        Pre-syncope ICD-10-CM: R55  ICD-9-CM: 780.2  8/19/2014 - Present        Renal cell carcinoma (HCC) ICD-10-CM: C64.9  ICD-9-CM: 189.0  7/3/2014 - Present    Overview Signed 7/3/2014  3:30 PM by Miles Noonan MD     S/p Left partial nephrectomy, 3/2014,             History of screening mammography (Chronic) ICD-10-CM: Z92.89  ICD-9-CM: V15.89  3/7/2012 - Present    Overview Addendum 3/12/2013  1:25 PM by Miles Noonan MD     3/12/13, normal, [facility]             Parkinson disease ([facility] ICD-10-CM: Teresita José Miguel  ICD-9-CM: 332.0  1/19/2012 - Present    Overview Addendum 7/3/2014  3:31 PM by Miles Noonan MD     Advanced disease, Dr. Star Chandra               HTN (hypertension) ICD-10-CM: I10  ICD-9-CM: 401.9 1/19/2012 - Present        Hyperlipemia ICD-10-CM: E78.5  ICD-9-CM: 272.4  1/19/2012 - Present        S/P TKR (total knee replacement) ICD-10-CM: U87.140  ICD-9-CM: V43.65  1/19/2012 - Present    Overview Addendum 1/19/2012  8:16 PM by Tee Maxwell MD     bilateral , 2000, Dr. Kyra Fitzgerald             S/P radioactive iodine thyroid ablation ICD-10-CM: Z92.3  ICD-9-CM: V45.89  1/19/2012 - Present    Overview Signed 1/19/2012  8:15 PM by Tee Maxwell MD     1999             S/P NINI-BSO ICD-10-CM: Z90.710, O38.449, Z90.79  ICD-9-CM: V88.01, V45.77  1/19/2012 - Present        Environmental allergies ICD-10-CM: Z91.09  ICD-9-CM: V15.09  1/19/2012 - Present        Anemia ICD-10-CM: D64.9  ICD-9-CM: 285.9  1/19/2012 - Present        * (Principal)RESOLVED: Dehydration ICD-10-CM: E86.0  ICD-9-CM: 276.51  12/11/2017 - 12/13/2017        RESOLVED: Acute hyperkalemia ICD-10-CM: E87.5  ICD-9-CM: 276.7  12/11/2017 - 12/13/2017        RESOLVED: Encephalopathy ICD-10-CM: G93.40  ICD-9-CM: 348.30  6/8/2015 - 5/25/2016        RESOLVED: Dehydration ICD-10-CM: E86.0  ICD-9-CM: 276.51  6/20/2014 - 5/25/2016        RESOLVED:  (acute kidney injury) ([facility] ICD-10-CM: N17.9  ICD-9-CM: 584.9  6/20/2014 - 5/25/2016            Greater than 30 minutes were spent with the patient on counseling and coordination of care    Signed:   Montserrat Mckeon NP  12/13/2017  12:29 PM

## 2017-12-13 NOTE — PROGRESS NOTES
NP [facility], new order to DC patient's right chest port a cath prior to discharge.

## 2017-12-21 ENCOUNTER — APPOINTMENT (OUTPATIENT)
Dept: INFUSION THERAPY | Age: 82
End: 2017-12-21
Payer: MEDICARE

## 2017-12-28 ENCOUNTER — HOSPITAL ENCOUNTER (OUTPATIENT)
Dept: INFUSION THERAPY | Age: 82
Discharge: HOME | End: 2017-12-28
Payer: MEDICARE

## 2017-12-28 VITALS
OXYGEN SATURATION: 98 % | SYSTOLIC BLOOD PRESSURE: 140 MMHG | HEART RATE: 38 BPM | RESPIRATION RATE: 20 BRPM | TEMPERATURE: 97.6 F | DIASTOLIC BLOOD PRESSURE: 66 MMHG

## 2017-12-28 LAB
ALBUMIN SERPL-MCNC: 2.9 G/DL (ref 3.5–5)
ANION GAP SERPL CALC-SCNC: 7 MMOL/L (ref 5–15)
BUN SERPL-MCNC: 44 MG/DL (ref 6–20)
BUN/CREAT SERPL: 34 (ref 12–20)
CALCIUM SERPL-MCNC: 8.3 MG/DL (ref 8.5–10.1)
CALCIUM SERPL-MCNC: 8.3 MG/DL (ref 8.5–10.1)
CHLORIDE SERPL-SCNC: 110 MMOL/L (ref 97–108)
CO2 SERPL-SCNC: 23 MMOL/L (ref 21–32)
CREAT SERPL-MCNC: 1.29 MG/DL (ref 0.55–1.02)
GLUCOSE SERPL-MCNC: 93 MG/DL (ref 65–100)
HCT VFR BLD AUTO: 26.1 % (ref 35–47)
HGB BLD-MCNC: 8.4 G/DL (ref 11.5–16)
PHOSPHATE SERPL-MCNC: 3.4 MG/DL (ref 2.6–4.7)
POTASSIUM SERPL-SCNC: 4.6 MMOL/L (ref 3.5–5.1)
PTH-INTACT SERPL-MCNC: 237.5 PG/ML (ref 14–72)
SODIUM SERPL-SCNC: 140 MMOL/L (ref 136–145)

## 2017-12-28 PROCEDURE — 74011250636 HC RX REV CODE- 250/636: Performed by: INTERNAL MEDICINE

## 2017-12-28 PROCEDURE — 36415 COLL VENOUS BLD VENIPUNCTURE: CPT | Performed by: INTERNAL MEDICINE

## 2017-12-28 PROCEDURE — 85018 HEMOGLOBIN: CPT | Performed by: INTERNAL MEDICINE

## 2017-12-28 PROCEDURE — 74011000250 HC RX REV CODE- 250: Performed by: INTERNAL MEDICINE

## 2017-12-28 PROCEDURE — 96372 THER/PROPH/DIAG INJ SC/IM: CPT

## 2017-12-28 PROCEDURE — 77030012965 HC NDL HUBR BBMI -A

## 2017-12-28 PROCEDURE — 80069 RENAL FUNCTION PANEL: CPT | Performed by: INTERNAL MEDICINE

## 2017-12-28 PROCEDURE — 83970 ASSAY OF PARATHORMONE: CPT | Performed by: INTERNAL MEDICINE

## 2017-12-28 RX ORDER — SODIUM CHLORIDE 9 MG/ML
10 INJECTION INTRAMUSCULAR; INTRAVENOUS; SUBCUTANEOUS AS NEEDED
Status: DISPENSED | OUTPATIENT
Start: 2017-12-28 | End: 2017-12-29

## 2017-12-28 RX ORDER — SODIUM CHLORIDE 0.9 % (FLUSH) 0.9 %
10-40 SYRINGE (ML) INJECTION AS NEEDED
Status: ACTIVE | OUTPATIENT
Start: 2017-12-28 | End: 2017-12-29

## 2017-12-28 RX ORDER — HEPARIN 100 UNIT/ML
500 SYRINGE INTRAVENOUS AS NEEDED
Status: ACTIVE | OUTPATIENT
Start: 2017-12-28 | End: 2017-12-29

## 2017-12-28 RX ADMIN — SODIUM CHLORIDE, PRESERVATIVE FREE 500 UNITS: 5 INJECTION INTRAVENOUS at 14:38

## 2017-12-28 RX ADMIN — SODIUM CHLORIDE 10 ML: 9 INJECTION INTRAMUSCULAR; INTRAVENOUS; SUBCUTANEOUS at 14:37

## 2017-12-28 RX ADMIN — ERYTHROPOIETIN 20000 UNITS: 20000 INJECTION, SOLUTION INTRAVENOUS; SUBCUTANEOUS at 15:33

## 2017-12-28 RX ADMIN — Medication 20 ML: at 14:37

## 2017-12-28 NOTE — PROGRESS NOTES
Outpatient Infusion Center Progress Note    1430 Pt admit to [facility] for labs/Procrit. Pt wheelchair bound and in stable condition. Care worker at bedside. Assessment completed, full assessment in [facility], bradycardia present on admit. No new concerns voiced. Frank Leal at MD office for orders to access port for labs. Right chest port accessed via [facility] protocol, labs drawn per order and sent. Recent Results (from the past 12 hour(s))   HGB & HCT    Collection Time: 12/28/17  2:39 PM   Result Value Ref Range    HGB 8.4 (L) 11.5 - 16.0 g/dL    HCT 26.1 (L) 35.0 - 47.0 %   RENAL FUNCTION PANEL    Collection Time: 12/28/17  2:39 PM   Result Value Ref Range    Sodium 140 136 - 145 mmol/L    Potassium 4.6 3.5 - 5.1 mmol/L    Chloride 110 (H) 97 - 108 mmol/L    CO2 23 21 - 32 mmol/L    Anion gap 7 5 - 15 mmol/L    Glucose 93 65 - 100 mg/dL    BUN 44 (H) 6 - 20 MG/DL    Creatinine 1.29 (H) 0.55 - 1.02 MG/DL    BUN/Creatinine ratio 34 (H) 12 - 20      GFR est AA 47 (L) >60 ml/min/1.73m2    GFR est non-AA 39 (L) >60 ml/min/1.73m2    Calcium 8.3 (L) 8.5 - 10.1 MG/DL    Phosphorus 3.4 2.6 - 4.7 MG/DL    Albumin 2.9 (L) 3.5 - 5.0 g/dL   PTH INTACT    Collection Time: 12/28/17  2:39 PM   Result Value Ref Range    Calcium 8.3 (L) 8.5 - 10.1 MG/DL    PTH, Intact 237.5 (H) 14.0 - 72.0 pg/mL         Visit Vitals    /66    Pulse (!) 38    Temp 97.6 °F (36.4 °C)    Resp 20    SpO2 98%    Breastfeeding No       Medications:  Procrit-SQ-right arm    1535 Pt tolerated treatment well. D/c to [facility] via transport van in no distress. Pt aware of next appointment scheduled for 01/25/18.

## 2018-01-18 ENCOUNTER — APPOINTMENT (OUTPATIENT)
Dept: INFUSION THERAPY | Age: 83
End: 2018-01-18
Payer: MEDICARE

## 2018-01-25 ENCOUNTER — HOSPITAL ENCOUNTER (OUTPATIENT)
Dept: INFUSION THERAPY | Age: 83
Discharge: HOME | End: 2018-01-25
Payer: MEDICARE

## 2018-01-25 VITALS
DIASTOLIC BLOOD PRESSURE: 78 MMHG | TEMPERATURE: 98.2 F | RESPIRATION RATE: 18 BRPM | HEART RATE: 44 BPM | SYSTOLIC BLOOD PRESSURE: 130 MMHG

## 2018-01-25 LAB
ALBUMIN SERPL-MCNC: 3 G/DL (ref 3.5–5)
ANION GAP SERPL CALC-SCNC: 10 MMOL/L (ref 5–15)
BUN SERPL-MCNC: 46 MG/DL (ref 6–20)
BUN/CREAT SERPL: 26 (ref 12–20)
CALCIUM SERPL-MCNC: 8 MG/DL (ref 8.5–10.1)
CALCIUM SERPL-MCNC: 8.1 MG/DL (ref 8.5–10.1)
CHLORIDE SERPL-SCNC: 107 MMOL/L (ref 97–108)
CO2 SERPL-SCNC: 21 MMOL/L (ref 21–32)
CREAT SERPL-MCNC: 1.78 MG/DL (ref 0.55–1.02)
GLUCOSE SERPL-MCNC: 127 MG/DL (ref 65–100)
HCT VFR BLD AUTO: 30.1 % (ref 35–47)
HGB BLD-MCNC: 8.1 G/DL (ref 11.5–16)
HGB BLD-MCNC: 9.7 G/DL (ref 11.5–16)
PHOSPHATE SERPL-MCNC: 3.7 MG/DL (ref 2.6–4.7)
POTASSIUM SERPL-SCNC: 5.5 MMOL/L (ref 3.5–5.1)
PTH-INTACT SERPL-MCNC: 293.1 PG/ML (ref 14–72)
SODIUM SERPL-SCNC: 138 MMOL/L (ref 136–145)

## 2018-01-25 PROCEDURE — 74011250636 HC RX REV CODE- 250/636: Performed by: INTERNAL MEDICINE

## 2018-01-25 PROCEDURE — 80069 RENAL FUNCTION PANEL: CPT | Performed by: INTERNAL MEDICINE

## 2018-01-25 PROCEDURE — 85018 HEMOGLOBIN: CPT

## 2018-01-25 PROCEDURE — 85018 HEMOGLOBIN: CPT | Performed by: INTERNAL MEDICINE

## 2018-01-25 PROCEDURE — 36415 COLL VENOUS BLD VENIPUNCTURE: CPT | Performed by: INTERNAL MEDICINE

## 2018-01-25 PROCEDURE — 96372 THER/PROPH/DIAG INJ SC/IM: CPT

## 2018-01-25 PROCEDURE — 83970 ASSAY OF PARATHORMONE: CPT | Performed by: INTERNAL MEDICINE

## 2018-01-25 RX ADMIN — ERYTHROPOIETIN 20000 UNITS: 20000 INJECTION, SOLUTION INTRAVENOUS; SUBCUTANEOUS at 16:09

## 2018-01-25 NOTE — PROGRESS NOTES
Outpatient Infusion Center Progress Note    1410 Pt admit to [facility] for labs/Procrit. Pt in wheelchair in stable condition. Resident aid at chairside. Assessment completed. No new concerns voiced. Labs drawn from port and sent for processing. Recent Results (from the past 12 hour(s))   RENAL FUNCTION PANEL    Collection Time: 01/25/18  2:08 PM   Result Value Ref Range    Sodium 138 136 - 145 mmol/L    Potassium 5.5 (H) 3.5 - 5.1 mmol/L    Chloride 107 97 - 108 mmol/L    CO2 21 21 - 32 mmol/L    Anion gap 10 5 - 15 mmol/L    Glucose 127 (H) 65 - 100 mg/dL    BUN 46 (H) 6 - 20 MG/DL    Creatinine 1.78 (H) 0.55 - 1.02 MG/DL    BUN/Creatinine ratio 26 (H) 12 - 20      GFR est AA 33 (L) >60 ml/min/1.73m2    GFR est non-AA 27 (L) >60 ml/min/1.73m2    Calcium 8.0 (L) 8.5 - 10.1 MG/DL    Phosphorus 3.7 2.6 - 4.7 MG/DL    Albumin 3.0 (L) 3.5 - 5.0 g/dL   PTH INTACT    Collection Time: 01/25/18  2:08 PM   Result Value Ref Range    Calcium 8.1 (L) 8.5 - 10.1 MG/DL    PTH, Intact 293.1 (H) 14.0 - 72.0 pg/mL   POC HEMOGLOBIN    Collection Time: 01/25/18  4:00 PM   Result Value Ref Range    Hemoglobin (POC) 8.1 (L) 11.5 - 16.0 g/dL     At the time of this note, lab staff advised [facility] of CBC analyzer being broken. As a result, completed a POC HGB on pt per her request.     Visit Vitals    /78 (BP 1 Location: Right arm, BP Patient Position: Sitting)    Pulse (!) 44    Temp 98.2 °F (36.8 °C)    Resp 18    Breastfeeding No       Medications:  Procrit-SQ-right arm    1610 Pt tolerated treatment well. D/c home in no distress. Pt aware of next appointment scheduled for 02/22/18 at 1400.

## 2018-02-22 ENCOUNTER — HOSPITAL ENCOUNTER (OUTPATIENT)
Dept: INFUSION THERAPY | Age: 83
Discharge: HOME | End: 2018-02-22
Payer: MEDICARE

## 2018-02-22 VITALS — SYSTOLIC BLOOD PRESSURE: 110 MMHG | RESPIRATION RATE: 18 BRPM | TEMPERATURE: 98.1 F | DIASTOLIC BLOOD PRESSURE: 50 MMHG

## 2018-02-22 LAB
ALBUMIN SERPL-MCNC: 3.1 G/DL (ref 3.5–5)
ANION GAP SERPL CALC-SCNC: 5 MMOL/L (ref 5–15)
BUN SERPL-MCNC: 45 MG/DL (ref 6–20)
BUN/CREAT SERPL: 26 (ref 12–20)
CALCIUM SERPL-MCNC: 8.4 MG/DL (ref 8.5–10.1)
CALCIUM SERPL-MCNC: 8.5 MG/DL (ref 8.5–10.1)
CHLORIDE SERPL-SCNC: 111 MMOL/L (ref 97–108)
CO2 SERPL-SCNC: 23 MMOL/L (ref 21–32)
CREAT SERPL-MCNC: 1.74 MG/DL (ref 0.55–1.02)
FERRITIN SERPL-MCNC: 542 NG/ML (ref 8–252)
GLUCOSE SERPL-MCNC: 95 MG/DL (ref 65–100)
HCT VFR BLD AUTO: 28.5 % (ref 35–47)
HGB BLD-MCNC: 9 G/DL (ref 11.5–16)
IRON SATN MFR SERPL: 27 % (ref 20–50)
IRON SERPL-MCNC: 45 UG/DL (ref 35–150)
PHOSPHATE SERPL-MCNC: 4.2 MG/DL (ref 2.6–4.7)
POTASSIUM SERPL-SCNC: 6.5 MMOL/L (ref 3.5–5.1)
PTH-INTACT SERPL-MCNC: 257.2 PG/ML (ref 18.4–88)
SODIUM SERPL-SCNC: 139 MMOL/L (ref 136–145)
TIBC SERPL-MCNC: 164 UG/DL (ref 250–450)
URATE SERPL-MCNC: 7.4 MG/DL (ref 2.6–6)

## 2018-02-22 PROCEDURE — 36415 COLL VENOUS BLD VENIPUNCTURE: CPT | Performed by: INTERNAL MEDICINE

## 2018-02-22 PROCEDURE — 80069 RENAL FUNCTION PANEL: CPT | Performed by: INTERNAL MEDICINE

## 2018-02-22 PROCEDURE — 83540 ASSAY OF IRON: CPT | Performed by: INTERNAL MEDICINE

## 2018-02-22 PROCEDURE — 84550 ASSAY OF BLOOD/URIC ACID: CPT | Performed by: INTERNAL MEDICINE

## 2018-02-22 PROCEDURE — 85018 HEMOGLOBIN: CPT | Performed by: INTERNAL MEDICINE

## 2018-02-22 PROCEDURE — 96372 THER/PROPH/DIAG INJ SC/IM: CPT

## 2018-02-22 PROCEDURE — 74011250636 HC RX REV CODE- 250/636: Performed by: INTERNAL MEDICINE

## 2018-02-22 PROCEDURE — 83970 ASSAY OF PARATHORMONE: CPT | Performed by: INTERNAL MEDICINE

## 2018-02-22 PROCEDURE — 82728 ASSAY OF FERRITIN: CPT | Performed by: INTERNAL MEDICINE

## 2018-02-22 RX ADMIN — ERYTHROPOIETIN 20000 UNITS: 20000 INJECTION, SOLUTION INTRAVENOUS; SUBCUTANEOUS at 14:55

## 2018-02-22 NOTE — PROGRESS NOTES
Outpatient Infusion Center Progress Note    1400 Pt admit to [address] [place] [address] for labs/Procrit. Pt in wheelchair accompanied by nursing facility aid and in stable condition. Assessment completed. No new concerns voiced. Port accessed with positive blood return. Labs drawn and sent for processing. Recent Results (from the past 12 hour(s))   HGB & HCT    Collection Time: 02/22/18  2:00 PM   Result Value Ref Range    HGB 9.0 (L) 11.5 - 16.0 g/dL    HCT 28.5 (L) 35.0 - 47.0 %   RENAL FUNCTION PANEL    Collection Time: 02/22/18  2:00 PM   Result Value Ref Range    Sodium 139 136 - 145 mmol/L    Potassium 6.5 (H) 3.5 - 5.1 mmol/L    Chloride 111 (H) 97 - 108 mmol/L    CO2 23 21 - 32 mmol/L    Anion gap 5 5 - 15 mmol/L    Glucose 95 65 - 100 mg/dL    BUN 45 (H) 6 - 20 MG/DL    Creatinine 1.74 (H) 0.55 - 1.02 MG/DL    BUN/Creatinine ratio 26 (H) 12 - 20      GFR est AA 33 (L) >60 ml/min/1.73m2    GFR est non-AA 28 (L) >60 ml/min/1.73m2    Calcium 8.4 (L) 8.5 - 10.1 MG/DL    Phosphorus 4.2 2.6 - 4.7 MG/DL    Albumin 3.1 (L) 3.5 - 5.0 g/dL   IRON PROFILE    Collection Time: 02/22/18  2:00 PM   Result Value Ref Range    Iron 45 35 - 150 ug/dL    TIBC 164 (L) 250 - 450 ug/dL    Iron % saturation 27 20 - 50 %   URIC ACID    Collection Time: 02/22/18  2:00 PM   Result Value Ref Range    Uric acid 7.4 (H) 2.6 - 6.0 MG/DL   FERRITIN    Collection Time: 02/22/18  2:01 PM   Result Value Ref Range    Ferritin 542 (H) 8 - 252 NG/ML   PTH INTACT    Collection Time: 02/22/18  2:01 PM   Result Value Ref Range    Calcium 8.5 8.5 - 10.1 MG/DL    PTH, Intact 257.2 (H) 18.4 - 88.0 pg/mL       Visit Vitals    /50    Temp 98.1 °F (36.7 °C)    Resp 18    Breastfeeding No       Medications:  Procrit-SQ-right arm    1455 Pt tolerated treatment well. D/c home in no distress. Pt aware of next appointment scheduled for 03/22/18 at 1400.

## 2018-03-22 ENCOUNTER — HOSPITAL ENCOUNTER (OUTPATIENT)
Dept: INFUSION THERAPY | Age: 83
Discharge: HOME | End: 2018-03-22
Payer: MEDICARE

## 2018-03-22 VITALS
HEART RATE: 43 BPM | OXYGEN SATURATION: 97 % | RESPIRATION RATE: 18 BRPM | SYSTOLIC BLOOD PRESSURE: 121 MMHG | TEMPERATURE: 96.9 F | DIASTOLIC BLOOD PRESSURE: 47 MMHG

## 2018-03-22 LAB
ALBUMIN SERPL-MCNC: 3 G/DL (ref 3.5–5)
ANION GAP SERPL CALC-SCNC: 4 MMOL/L (ref 5–15)
BUN SERPL-MCNC: 38 MG/DL (ref 6–20)
BUN/CREAT SERPL: 27 (ref 12–20)
CALCIUM SERPL-MCNC: 8.4 MG/DL (ref 8.5–10.1)
CALCIUM SERPL-MCNC: 8.4 MG/DL (ref 8.5–10.1)
CHLORIDE SERPL-SCNC: 108 MMOL/L (ref 97–108)
CO2 SERPL-SCNC: 25 MMOL/L (ref 21–32)
CREAT SERPL-MCNC: 1.42 MG/DL (ref 0.55–1.02)
GLUCOSE SERPL-MCNC: 84 MG/DL (ref 65–100)
HCT VFR BLD AUTO: 28.1 % (ref 35–47)
HGB BLD-MCNC: 8.9 G/DL (ref 11.5–16)
PHOSPHATE SERPL-MCNC: 4 MG/DL (ref 2.6–4.7)
POTASSIUM SERPL-SCNC: 6.3 MMOL/L (ref 3.5–5.1)
PTH-INTACT SERPL-MCNC: 330.6 PG/ML (ref 18.4–88)
SODIUM SERPL-SCNC: 137 MMOL/L (ref 136–145)

## 2018-03-22 PROCEDURE — 74011250636 HC RX REV CODE- 250/636: Performed by: INTERNAL MEDICINE

## 2018-03-22 PROCEDURE — 36415 COLL VENOUS BLD VENIPUNCTURE: CPT | Performed by: INTERNAL MEDICINE

## 2018-03-22 PROCEDURE — 80069 RENAL FUNCTION PANEL: CPT | Performed by: INTERNAL MEDICINE

## 2018-03-22 PROCEDURE — 77030012965 HC NDL HUBR BBMI -A

## 2018-03-22 PROCEDURE — 96372 THER/PROPH/DIAG INJ SC/IM: CPT

## 2018-03-22 PROCEDURE — 83970 ASSAY OF PARATHORMONE: CPT | Performed by: INTERNAL MEDICINE

## 2018-03-22 PROCEDURE — 85018 HEMOGLOBIN: CPT | Performed by: INTERNAL MEDICINE

## 2018-03-22 RX ORDER — HEPARIN 100 UNIT/ML
500 SYRINGE INTRAVENOUS AS NEEDED
Status: ACTIVE | OUTPATIENT
Start: 2018-03-22 | End: 2018-03-23

## 2018-03-22 RX ORDER — SODIUM CHLORIDE 0.9 % (FLUSH) 0.9 %
10-40 SYRINGE (ML) INJECTION AS NEEDED
Status: ACTIVE | OUTPATIENT
Start: 2018-03-22 | End: 2018-03-23

## 2018-03-22 RX ADMIN — Medication 20 ML: at 14:40

## 2018-03-22 RX ADMIN — SODIUM CHLORIDE, PRESERVATIVE FREE 500 UNITS: 5 INJECTION INTRAVENOUS at 14:42

## 2018-03-22 RX ADMIN — ERYTHROPOIETIN 20000 UNITS: 20000 INJECTION, SOLUTION INTRAVENOUS; SUBCUTANEOUS at 15:23

## 2018-03-22 NOTE — PROGRESS NOTES
Outpatient Infusion Center Progress Note    6015 Pt admit to [facility] for labs/Procrit. Pt wheelchair bound and in stable condition. Care worker at bedside. Assessment completed, full assessment in [facility], bradycardia present on admit. No new concerns voiced. Written orders for port access obtained from MD office, scanned into chart. Right chest port accessed via [facility] protocol, labs drawn per order and sent. Port flushed, heparinized per protocol. Mary Bruch removed. Visit Vitals    /47 (BP 1 Location: Right arm, BP Patient Position: Sitting)    Pulse (!) 43    Temp 96.9 °F (36.1 °C)    Resp 18    SpO2 97%       Medications:  Procrit-SQ-right arm    1530 Pt tolerated treatment well. D/c to [facility] via transport van in no distress. Pt aware of next appointment scheduled for 04/19/18.     Recent Results (from the past 12 hour(s))   HGB & HCT    Collection Time: 03/22/18  2:41 PM   Result Value Ref Range    HGB 8.9 (L) 11.5 - 16.0 g/dL    HCT 28.1 (L) 35.0 - 47.0 %   RENAL FUNCTION PANEL    Collection Time: 03/22/18  2:41 PM   Result Value Ref Range    Sodium 137 136 - 145 mmol/L    Potassium 6.3 (H) 3.5 - 5.1 mmol/L    Chloride 108 97 - 108 mmol/L    CO2 25 21 - 32 mmol/L    Anion gap 4 (L) 5 - 15 mmol/L    Glucose 84 65 - 100 mg/dL    BUN 38 (H) 6 - 20 MG/DL    Creatinine 1.42 (H) 0.55 - 1.02 MG/DL    BUN/Creatinine ratio 27 (H) 12 - 20      GFR est AA 42 (L) >60 ml/min/1.73m2    GFR est non-AA 35 (L) >60 ml/min/1.73m2    Calcium 8.4 (L) 8.5 - 10.1 MG/DL    Phosphorus 4.0 2.6 - 4.7 MG/DL    Albumin 3.0 (L) 3.5 - 5.0 g/dL   PTH INTACT    Collection Time: 03/22/18  2:41 PM   Result Value Ref Range    Calcium 8.4 (L) 8.5 - 10.1 MG/DL    PTH, Intact 330.6 (H) 18.4 - 88.0 pg/mL

## 2018-04-19 ENCOUNTER — HOSPITAL ENCOUNTER (OUTPATIENT)
Dept: INFUSION THERAPY | Age: 83
Discharge: HOME | End: 2018-04-19
Payer: MEDICARE

## 2018-04-19 VITALS
DIASTOLIC BLOOD PRESSURE: 50 MMHG | RESPIRATION RATE: 18 BRPM | TEMPERATURE: 97.6 F | SYSTOLIC BLOOD PRESSURE: 122 MMHG | HEART RATE: 44 BPM

## 2018-04-19 LAB
ALBUMIN SERPL-MCNC: 3.3 G/DL (ref 3.5–5)
ANION GAP SERPL CALC-SCNC: 5 MMOL/L (ref 5–15)
BUN SERPL-MCNC: 40 MG/DL (ref 6–20)
BUN/CREAT SERPL: 27 (ref 12–20)
CALCIUM SERPL-MCNC: 8.9 MG/DL (ref 8.5–10.1)
CALCIUM SERPL-MCNC: 8.9 MG/DL (ref 8.5–10.1)
CHLORIDE SERPL-SCNC: 109 MMOL/L (ref 97–108)
CO2 SERPL-SCNC: 26 MMOL/L (ref 21–32)
CREAT SERPL-MCNC: 1.46 MG/DL (ref 0.55–1.02)
GLUCOSE SERPL-MCNC: 92 MG/DL (ref 65–100)
HCT VFR BLD AUTO: 31.3 % (ref 35–47)
HGB BLD-MCNC: 10 G/DL (ref 11.5–16)
PHOSPHATE SERPL-MCNC: 3.8 MG/DL (ref 2.6–4.7)
POTASSIUM SERPL-SCNC: 5.2 MMOL/L (ref 3.5–5.1)
PTH-INTACT SERPL-MCNC: 197.9 PG/ML (ref 18.4–88)
SODIUM SERPL-SCNC: 140 MMOL/L (ref 136–145)

## 2018-04-19 PROCEDURE — 77030012965 HC NDL HUBR BBMI -A

## 2018-04-19 PROCEDURE — 80069 RENAL FUNCTION PANEL: CPT | Performed by: INTERNAL MEDICINE

## 2018-04-19 PROCEDURE — 36415 COLL VENOUS BLD VENIPUNCTURE: CPT | Performed by: INTERNAL MEDICINE

## 2018-04-19 PROCEDURE — 83970 ASSAY OF PARATHORMONE: CPT | Performed by: INTERNAL MEDICINE

## 2018-04-19 PROCEDURE — 74011250636 HC RX REV CODE- 250/636: Performed by: INTERNAL MEDICINE

## 2018-04-19 PROCEDURE — 96372 THER/PROPH/DIAG INJ SC/IM: CPT

## 2018-04-19 PROCEDURE — 85018 HEMOGLOBIN: CPT | Performed by: INTERNAL MEDICINE

## 2018-04-19 PROCEDURE — 74011000250 HC RX REV CODE- 250: Performed by: INTERNAL MEDICINE

## 2018-04-19 RX ORDER — SODIUM CHLORIDE 9 MG/ML
10 INJECTION INTRAMUSCULAR; INTRAVENOUS; SUBCUTANEOUS AS NEEDED
Status: ACTIVE | OUTPATIENT
Start: 2018-04-19 | End: 2018-04-20

## 2018-04-19 RX ORDER — HEPARIN 100 UNIT/ML
500 SYRINGE INTRAVENOUS AS NEEDED
Status: ACTIVE | OUTPATIENT
Start: 2018-04-19 | End: 2018-04-20

## 2018-04-19 RX ORDER — SODIUM CHLORIDE 0.9 % (FLUSH) 0.9 %
5-10 SYRINGE (ML) INJECTION AS NEEDED
Status: ACTIVE | OUTPATIENT
Start: 2018-04-19 | End: 2018-04-20

## 2018-04-19 RX ADMIN — SODIUM CHLORIDE, PRESERVATIVE FREE 500 UNITS: 5 INJECTION INTRAVENOUS at 14:13

## 2018-04-19 RX ADMIN — Medication 10 ML: at 14:13

## 2018-04-19 RX ADMIN — SODIUM CHLORIDE 10 ML: 9 INJECTION INTRAMUSCULAR; INTRAVENOUS; SUBCUTANEOUS at 14:11

## 2018-04-19 RX ADMIN — ERYTHROPOIETIN 20000 UNITS: 20000 INJECTION, SOLUTION INTRAVENOUS; SUBCUTANEOUS at 15:12

## 2018-06-06 ENCOUNTER — HOSPITAL ENCOUNTER (OUTPATIENT)
Dept: INFUSION THERAPY | Age: 83
Discharge: HOME | End: 2018-06-06
Payer: MEDICARE

## 2018-06-06 VITALS
DIASTOLIC BLOOD PRESSURE: 80 MMHG | TEMPERATURE: 98 F | RESPIRATION RATE: 18 BRPM | HEART RATE: 63 BPM | SYSTOLIC BLOOD PRESSURE: 190 MMHG

## 2018-06-06 LAB
ALBUMIN SERPL-MCNC: 3.1 G/DL (ref 3.5–5)
ANION GAP SERPL CALC-SCNC: 8 MMOL/L (ref 5–15)
BUN SERPL-MCNC: 20 MG/DL (ref 6–20)
BUN/CREAT SERPL: 17 (ref 12–20)
CALCIUM SERPL-MCNC: 8.7 MG/DL (ref 8.5–10.1)
CALCIUM SERPL-MCNC: 8.8 MG/DL (ref 8.5–10.1)
CHLORIDE SERPL-SCNC: 110 MMOL/L (ref 97–108)
CO2 SERPL-SCNC: 26 MMOL/L (ref 21–32)
CREAT SERPL-MCNC: 1.16 MG/DL (ref 0.55–1.02)
GLUCOSE SERPL-MCNC: 73 MG/DL (ref 65–100)
HCT VFR BLD AUTO: 30.1 % (ref 35–47)
HGB BLD-MCNC: 9.5 G/DL (ref 11.5–16)
PHOSPHATE SERPL-MCNC: 2.8 MG/DL (ref 2.6–4.7)
POTASSIUM SERPL-SCNC: 4.6 MMOL/L (ref 3.5–5.1)
PTH-INTACT SERPL-MCNC: 167.1 PG/ML (ref 18.4–88)
SODIUM SERPL-SCNC: 144 MMOL/L (ref 136–145)

## 2018-06-06 PROCEDURE — 77030012965 HC NDL HUBR BBMI -A

## 2018-06-06 PROCEDURE — 96372 THER/PROPH/DIAG INJ SC/IM: CPT

## 2018-06-06 PROCEDURE — 85018 HEMOGLOBIN: CPT | Performed by: INTERNAL MEDICINE

## 2018-06-06 PROCEDURE — 36415 COLL VENOUS BLD VENIPUNCTURE: CPT | Performed by: INTERNAL MEDICINE

## 2018-06-06 PROCEDURE — 83970 ASSAY OF PARATHORMONE: CPT | Performed by: INTERNAL MEDICINE

## 2018-06-06 PROCEDURE — 74011250636 HC RX REV CODE- 250/636: Performed by: INTERNAL MEDICINE

## 2018-06-06 PROCEDURE — 80069 RENAL FUNCTION PANEL: CPT | Performed by: INTERNAL MEDICINE

## 2018-06-06 RX ORDER — SODIUM CHLORIDE 0.9 % (FLUSH) 0.9 %
5-10 SYRINGE (ML) INJECTION AS NEEDED
Status: ACTIVE | OUTPATIENT
Start: 2018-06-06 | End: 2018-06-07

## 2018-06-06 RX ORDER — SODIUM CHLORIDE 9 MG/ML
10 INJECTION INTRAMUSCULAR; INTRAVENOUS; SUBCUTANEOUS AS NEEDED
Status: ACTIVE | OUTPATIENT
Start: 2018-06-06 | End: 2018-06-07

## 2018-06-06 RX ORDER — HEPARIN 100 UNIT/ML
500 SYRINGE INTRAVENOUS AS NEEDED
Status: ACTIVE | OUTPATIENT
Start: 2018-06-06 | End: 2018-06-07

## 2018-06-06 RX ADMIN — ERYTHROPOIETIN 20000 UNITS: 20000 INJECTION, SOLUTION INTRAVENOUS; SUBCUTANEOUS at 14:32

## 2018-06-06 NOTE — PROGRESS NOTES
Outpatient Infusion Center Short Visit Progress Note    1300 Pt admit to [facility] for [facility] ambulatory in stable condition. Assessment completed. No new concerns voiced. Please review pending lab results in [address] [place] [address]. Patient Vitals for the past 12 hrs:   Temp Pulse Resp BP   06/06/18 1305 98 °F (36.7 °C) 63 18 190/80       Port with positive blood return. Lab drawn, flushed, heparinized and de-accessed per protocol. Medications:  Procrit SQ left arm     1435 Pt tolerated treatment well. D/c home ambulatory in no distress. Pt aware of next appointment scheduled for 7/5/18.

## 2018-06-14 ENCOUNTER — APPOINTMENT (OUTPATIENT)
Dept: INFUSION THERAPY | Age: 83
End: 2018-06-14
Payer: MEDICARE

## 2018-07-05 ENCOUNTER — HOSPITAL ENCOUNTER (OUTPATIENT)
Dept: INFUSION THERAPY | Age: 83
Discharge: HOME | End: 2018-07-05
Payer: MEDICARE

## 2018-07-05 VITALS
SYSTOLIC BLOOD PRESSURE: 177 MMHG | DIASTOLIC BLOOD PRESSURE: 72 MMHG | RESPIRATION RATE: 18 BRPM | HEART RATE: 61 BPM | TEMPERATURE: 97.7 F

## 2018-07-05 LAB
ALBUMIN SERPL-MCNC: 2.7 G/DL (ref 3.5–5)
ANION GAP SERPL CALC-SCNC: 8 MMOL/L (ref 5–15)
BUN SERPL-MCNC: 30 MG/DL (ref 6–20)
BUN/CREAT SERPL: 23 (ref 12–20)
CALCIUM SERPL-MCNC: 8.3 MG/DL (ref 8.5–10.1)
CALCIUM SERPL-MCNC: 8.3 MG/DL (ref 8.5–10.1)
CHLORIDE SERPL-SCNC: 112 MMOL/L (ref 97–108)
CO2 SERPL-SCNC: 26 MMOL/L (ref 21–32)
CREAT SERPL-MCNC: 1.33 MG/DL (ref 0.55–1.02)
GLUCOSE SERPL-MCNC: 95 MG/DL (ref 65–100)
HCT VFR BLD AUTO: 29.2 % (ref 35–47)
HGB BLD-MCNC: 9.1 G/DL (ref 11.5–16)
PHOSPHATE SERPL-MCNC: 3.5 MG/DL (ref 2.6–4.7)
POTASSIUM SERPL-SCNC: 3.9 MMOL/L (ref 3.5–5.1)
PTH-INTACT SERPL-MCNC: 234.5 PG/ML (ref 18.4–88)
SODIUM SERPL-SCNC: 146 MMOL/L (ref 136–145)

## 2018-07-05 PROCEDURE — 96523 IRRIG DRUG DELIVERY DEVICE: CPT

## 2018-07-05 PROCEDURE — 80069 RENAL FUNCTION PANEL: CPT | Performed by: INTERNAL MEDICINE

## 2018-07-05 PROCEDURE — 77030012965 HC NDL HUBR BBMI -A

## 2018-07-05 PROCEDURE — 36415 COLL VENOUS BLD VENIPUNCTURE: CPT | Performed by: INTERNAL MEDICINE

## 2018-07-05 PROCEDURE — 85018 HEMOGLOBIN: CPT | Performed by: INTERNAL MEDICINE

## 2018-07-05 PROCEDURE — 74011250636 HC RX REV CODE- 250/636: Performed by: INTERNAL MEDICINE

## 2018-07-05 PROCEDURE — 83970 ASSAY OF PARATHORMONE: CPT | Performed by: INTERNAL MEDICINE

## 2018-07-05 PROCEDURE — 96372 THER/PROPH/DIAG INJ SC/IM: CPT

## 2018-07-05 RX ADMIN — ERYTHROPOIETIN 20000 UNITS: 20000 INJECTION, SOLUTION INTRAVENOUS; SUBCUTANEOUS at 13:45

## 2018-07-05 NOTE — PROGRESS NOTES
Outpatient Infusion Center Short Visit Progress Note    4505 Pt admit to [facility] for [facility] ambulatory in stable condition. Assessment completed. No new concerns voiced. Please review pending lab results in [address] [place] [address]. Patient Vitals for the past 12 hrs:   Temp Pulse Resp BP   07/05/18 1305 97.7 °F (36.5 °C) 61 18 177/72       Port with positive blood return. Lab drawn, flushed, heparinized and de-accessed per protocol. Medications:  Procrit SQ right arm     1345 Pt tolerated treatment well. D/c home ambulatory in no distress. Pt aware of next appointment scheduled for 8/2/18.

## 2018-08-02 ENCOUNTER — HOSPITAL ENCOUNTER (OUTPATIENT)
Dept: INFUSION THERAPY | Age: 83
Discharge: HOME | End: 2018-08-02
Payer: MEDICARE

## 2018-08-02 VITALS
DIASTOLIC BLOOD PRESSURE: 76 MMHG | TEMPERATURE: 98 F | SYSTOLIC BLOOD PRESSURE: 168 MMHG | RESPIRATION RATE: 18 BRPM | HEART RATE: 74 BPM

## 2018-08-02 LAB
ALBUMIN SERPL-MCNC: 2.7 G/DL (ref 3.5–5)
ANION GAP SERPL CALC-SCNC: 5 MMOL/L (ref 5–15)
BUN SERPL-MCNC: 33 MG/DL (ref 6–20)
BUN/CREAT SERPL: 22 (ref 12–20)
CALCIUM SERPL-MCNC: 8.2 MG/DL (ref 8.5–10.1)
CALCIUM SERPL-MCNC: 8.4 MG/DL (ref 8.5–10.1)
CHLORIDE SERPL-SCNC: 108 MMOL/L (ref 97–108)
CO2 SERPL-SCNC: 29 MMOL/L (ref 21–32)
CREAT SERPL-MCNC: 1.48 MG/DL (ref 0.55–1.02)
GLUCOSE SERPL-MCNC: 88 MG/DL (ref 65–100)
HCT VFR BLD AUTO: 29.4 % (ref 35–47)
HGB BLD-MCNC: 9.3 G/DL (ref 11.5–16)
PHOSPHATE SERPL-MCNC: 3.8 MG/DL (ref 2.6–4.7)
POTASSIUM SERPL-SCNC: 4.7 MMOL/L (ref 3.5–5.1)
PTH-INTACT SERPL-MCNC: 238.4 PG/ML (ref 18.4–88)
SODIUM SERPL-SCNC: 142 MMOL/L (ref 136–145)

## 2018-08-02 PROCEDURE — 36591 DRAW BLOOD OFF VENOUS DEVICE: CPT

## 2018-08-02 PROCEDURE — 77030012965 HC NDL HUBR BBMI -A

## 2018-08-02 PROCEDURE — 83970 ASSAY OF PARATHORMONE: CPT | Performed by: INTERNAL MEDICINE

## 2018-08-02 PROCEDURE — 96372 THER/PROPH/DIAG INJ SC/IM: CPT

## 2018-08-02 PROCEDURE — 74011000250 HC RX REV CODE- 250: Performed by: INTERNAL MEDICINE

## 2018-08-02 PROCEDURE — 36415 COLL VENOUS BLD VENIPUNCTURE: CPT | Performed by: INTERNAL MEDICINE

## 2018-08-02 PROCEDURE — 85018 HEMOGLOBIN: CPT | Performed by: INTERNAL MEDICINE

## 2018-08-02 PROCEDURE — 80069 RENAL FUNCTION PANEL: CPT | Performed by: INTERNAL MEDICINE

## 2018-08-02 PROCEDURE — 74011250636 HC RX REV CODE- 250/636: Performed by: INTERNAL MEDICINE

## 2018-08-02 RX ORDER — SODIUM CHLORIDE 9 MG/ML
10 INJECTION INTRAMUSCULAR; INTRAVENOUS; SUBCUTANEOUS AS NEEDED
Status: ACTIVE | OUTPATIENT
Start: 2018-08-02 | End: 2018-08-03

## 2018-08-02 RX ORDER — SODIUM CHLORIDE 0.9 % (FLUSH) 0.9 %
5-10 SYRINGE (ML) INJECTION AS NEEDED
Status: ACTIVE | OUTPATIENT
Start: 2018-08-02 | End: 2018-08-03

## 2018-08-02 RX ORDER — HEPARIN 100 UNIT/ML
500 SYRINGE INTRAVENOUS AS NEEDED
Status: ACTIVE | OUTPATIENT
Start: 2018-08-02 | End: 2018-08-03

## 2018-08-02 RX ADMIN — SODIUM CHLORIDE 10 ML: 9 INJECTION INTRAMUSCULAR; INTRAVENOUS; SUBCUTANEOUS at 13:59

## 2018-08-02 RX ADMIN — ERYTHROPOIETIN 20000 UNITS: 20000 INJECTION, SOLUTION INTRAVENOUS; SUBCUTANEOUS at 13:56

## 2018-08-02 RX ADMIN — SODIUM CHLORIDE, PRESERVATIVE FREE 500 UNITS: 5 INJECTION INTRAVENOUS at 13:58

## 2018-08-02 NOTE — PROGRESS NOTES
Outpatient Infusion Center Short Visit Progress Note 1300 Pt admit to [facility] for [facility] ambulatory in stable condition. Assessment completed. No new concerns voiced. Please review pending lab results in [address] [place] [address]. Patient Vitals for the past 12 hrs: 
 Temp Pulse Resp BP  
08/02/18 1602 98 °F (36.7 °C) 74 18 168/76 Port with positive blood return. Lab drawn, flushed, heparinized and de-accessed per protocol. Medications: 
Procrit SQ right arm  
 
1415 Pt tolerated treatment well. D/c home ambulatory in no distress. Pt aware of next appointment scheduled for 8/30/18 1300.

## 2018-08-29 NOTE — H&P
History & Physical    Date of admission: 12/11/2017    Patient name: Pastor Bueno  MRN: [id number]  YOB: 1928  Age: 80 y.o. Primary care provider:  Mario Alberto Vaughn MD     Source of Information: patient, ED and medical records                              Chief complaint:  headache    History of present illness  Pastor Bueno is a 80 y.o.  female with past medical history of arthritis, CKD, renal mass, hypertension, hyperlipidemia, Parkinson's disease, thyroid goiter, s/p radioactive iodine ablation, DJD, s/p total knee replacement, claustrophobia presented to the ED from home via EMS s/p ground level fall with head laceration with chief complaint of headache. Patient is a limited historian. Majority of history is obtained per review of patient's medical records. Per these collective reports, patient fell ~ 1 hour prior to admission to the ED. Mechanism of the fall is not certain. She was reportedly walking and fell on a carpeted surface, hitting her head, with resulting head laceration, minimal loss blood, and no reported loss of consciousness. Patient complained of a headache s/p fall. On arrival in the ED, initial recorded vital signs were BP= 173/76, HR= 80, RR= 18, O2sat= 100%. Workup included CT head and c spine which were negative for active process. Labs showed evidence of elevated potasium= 5.5, BUN= 35, creatinine= 1.21 and low hemoglobin= 9.9. Per the ED, patient was given Kayexalate 15 grams po x 1 dose. Patient had a 1 cm right forehead scalp laceration which was suture repaired/ closed per the ED. Patient reportedly had generalized weakness and dizziness. Per the ED, patient was orthostatic. Patient is now seen for admission to the hospitalist service for continued evaluation and treatments.      Past Medical History:   Diagnosis Date    Arthritis     Chronic kidney disease     RENAL MASS    Environmental allergies     HTN (hypertension) 1/19/2012    Hyperlipemia 1/19/2012    Hypertension     Other ill-defined conditions(799.89)     parkinson disease    Parkinson disease ([facility] [address] 1/19/2012    S/P radioactive iodine thyroid ablation 1/19/2012    S/P NINI-BSO 1/19/2012    S/P TKR (total knee replacement) 1/19/2012    Thyroid disease     goiter/hyperthyroid (s/p iodine tx 1999)    Unspecified adverse effect of anesthesia     CLAUSTROPHOBIA      Past Surgical History:   Procedure Laterality Date    ENDOSCOPY, COLON, DIAGNOSTIC  7/2011    ([facility])    HX GI      COLONOSCOPY    HX ORTHOPAEDIC      r hip, l knee replacement 2008    HX NINI AND BSO  1946     Prior to Admission medications    Medication Sig Start Date End Date Taking? Authorizing Provider   hydrALAZINE (APRESOLINE) 25 mg tablet TAKE 1 TABLET BY MOUTH TWICE DAILY 8/5/16   Jil Moss MD   OTHER Rollator Arias Dynes with seat  icd 10 - G20 5/25/16   Jil Moss MD   lovastatin (MEVACOR) 20 mg tablet TAKE 1 TABLET BY MOUTH EVERY DAY 5/24/16   Jil Moss MD   amLODIPine (NORVASC) 10 mg tablet TAKE 1 TABLET BY MOUTH ONCE DAILY 2/8/16   Jil Moss MD   docusate sodium (COLACE) 50 mg capsule Take 50 mg by mouth daily. Historical Provider   acetaminophen (TYLENOL) 500 mg tablet Take 500 mg by mouth every six (6) hours as needed for Pain. Historical Provider   carbidopa-levodopa (SINEMET)  mg per tablet Take 2 Tabs by mouth four (4) times daily. Historical Provider   multivitamin with iron tablet Take 1 Tab by mouth daily.  [employer]    Historical Provider     Allergies   Allergen Reactions    Bactroban [Mupirocin Calcium] Other (comments)     Skin peels    Betadine [Povidone-Iodine] Rash    Comtan [Entacapone] Diarrhea    Erythromycin Other (comments)     GI upset    Penicillins Itching    Sulfa (Sulfonamide Antibiotics) Itching      Family History   Problem Relation Age of Onset    Coronary Artery Disease Father      s/p MI  Heart Disease Father     Elevated Lipids Father     Cancer Brother      hodgkins    Anesth Problems Neg Hx          Social history  Patient resides              x  SNF    Ambulates          x  Assisted walker         Alcohol history   x  None           Smoking history  x  None             History   Smoking Status    Never Smoker   Smokeless Tobacco    Never Used       Code status  x  Full code     Review of systems  All systems reviewed; pertinent positives were as noted in HPI, otherwise negative. Physical Examination   Visit Vitals    /87 (BP 1 Location: Right arm, BP Patient Position: Supine)    Pulse 67    Temp 98.2 °F (36.8 °C)    Resp 24    Ht 5' 4\" (1.626 m)    SpO2 100%          O2 Device: Room air    General:  Elderly female in no acute respiratory distress   Head:  Right fronto-temporal scalp laceration wound (closed)/ intact with sutures with edema/ tenderness   Eyes:  Conjunctivae/corneas clear. PERRL, EOMs intact   E/N/M/T: Nares normal. Septum midline.  No nasal drainage or sinus tenderness  Tongue midline / non-edematous  Clear oropharynx   Neck: Normal appearance and movements, symmetrical, trachea midline  No palpable adenopathy  No thyroid enlargement, tenderness or nodules  No carotid bruit   Normal JVD   Lungs:   Symmetrical chest expansion and respiratory effort  Clear to auscultation bilaterally   Chest wall:  No tenderness or deformity   Heart:  Regular rate and rhythm   Sounds normal; no murmur, click, rub or gallop   Abdomen:   Soft, no tenderness  No rebound, guarding, or rigidity  Non-distended  Bowel sounds normal  No masses or hepatosplenomegaly  No hernias present   Back: No CVA tenderness   Extremities: Extremities normal, atraumatic  No cyanosis or edema     Pulses 2+ radial/ 1+ DP bilateral pulses   Skin: No rashes or ulcers  Warm/ dry   Musculo-      skeletal: Gait not tested  Limited range of motion with generalized weakness  No calf tenderness   Neuro: GCS 14 (E4 V4 M6). Moves all extremities x 4. No slurred speech. No facial droop. Sensation grossly intact. Psych: Alert, oriented x 2 to person and place only           Data Review      24 Hour Results:  Recent Results (from the past 24 hour(s))   EKG, 12 LEAD, INITIAL    Collection Time: 12/11/17  5:47 PM   Result Value Ref Range    Ventricular Rate 71 BPM    Atrial Rate 71 BPM    P-R Interval 162 ms    QRS Duration 88 ms    Q-T Interval 366 ms    QTC Calculation (Bezet) 397 ms    Calculated P Axis 79 degrees    Calculated R Axis -25 degrees    Calculated T Axis 64 degrees    Diagnosis       Normal sinus rhythm  When compared with ECG of 08-JUN-2015 17:03,  No significant change was found  Confirmed by Blade Anderson M.D., Patience Moritz ([id number]) on 12/11/2017 6:06:54 PM     CBC WITH AUTOMATED DIFF    Collection Time: 12/11/17  6:01 PM   Result Value Ref Range    WBC 5.3 3.6 - 11.0 K/uL    RBC 3.32 (L) 3.80 - 5.20 M/uL    HGB 9.9 (L) 11.5 - 16.0 g/dL    HCT 31.1 (L) 35.0 - 47.0 %    MCV 93.7 80.0 - 99.0 FL    MCH 29.8 26.0 - 34.0 PG    MCHC 31.8 30.0 - 36.5 g/dL    RDW 14.5 11.5 - 14.5 %    PLATELET 584 158 - 597 K/uL    NEUTROPHILS 56 32 - 75 %    LYMPHOCYTES 32 12 - 49 %    MONOCYTES 7 5 - 13 %    EOSINOPHILS 5 0 - 7 %    BASOPHILS 0 0 - 1 %    ABS. NEUTROPHILS 2.9 1.8 - 8.0 K/UL    ABS. LYMPHOCYTES 1.7 0.8 - 3.5 K/UL    ABS. MONOCYTES 0.4 0.0 - 1.0 K/UL    ABS. EOSINOPHILS 0.3 0.0 - 0.4 K/UL    ABS.  BASOPHILS 0.0 0.0 - 0.1 K/UL   METABOLIC PANEL, BASIC    Collection Time: 12/11/17  6:01 PM   Result Value Ref Range    Sodium 137 136 - 145 mmol/L    Potassium 5.5 (H) 3.5 - 5.1 mmol/L    Chloride 109 (H) 97 - 108 mmol/L    CO2 22 21 - 32 mmol/L    Anion gap 6 5 - 15 mmol/L    Glucose 81 65 - 100 mg/dL    BUN 35 (H) 6 - 20 MG/DL    Creatinine 1.21 (H) 0.55 - 1.02 MG/DL    BUN/Creatinine ratio 29 (H) 12 - 20      GFR est AA 51 (L) >60 ml/min/1.73m2    GFR est non-AA 42 (L) >60 ml/min/1.73m2    Calcium 9.0 8.5 - 10.1 MG/DL URINALYSIS W/ RFLX MICROSCOPIC    Collection Time: 12/11/17  6:01 PM   Result Value Ref Range    Color YELLOW/STRAW      Appearance CLEAR CLEAR      Specific gravity 1.010 1.003 - 1.030      pH (UA) 6.5 5.0 - 8.0      Protein 100 (A) NEG mg/dL    Glucose NEGATIVE  NEG mg/dL    Ketone NEGATIVE  NEG mg/dL    Bilirubin NEGATIVE  NEG      Blood NEGATIVE  NEG      Urobilinogen 0.2 0.2 - 1.0 EU/dL    Nitrites NEGATIVE  NEG      Leukocyte Esterase NEGATIVE  NEG      WBC 0-4 0 - 4 /hpf    RBC 0-5 0 - 5 /hpf    Epithelial cells FEW FEW /lpf    Bacteria 2+ (A) NEG /hpf   TROPONIN I    Collection Time: 12/11/17  6:01 PM   Result Value Ref Range    Troponin-I, Qt. <0.04 <0.05 ng/mL     Recent Labs      12/11/17   1801   WBC  5.3   HGB  9.9*   HCT  31.1*   PLT  195     Recent Labs      12/11/17   1801   NA  137   K  5.5*   CL  109*   CO2  22   GLU  81   BUN  35*   CREA  1.21*   CA  9.0       Imaging  Portable CXR. 1742 hours       INDICATION: weakness     The lungs are clear. Heart is borderline large. There is no overt pulmonary  edema. There is no evident pneumothorax, apparent adenopathy or sizable pleural  effusion. Port catheter is present.     IMPRESSION: No Acute Disease.     CT Head without Contrast:   CT dose reduction was achieved through use of a standardized protocol tailored  for this examination and automatic exposure control for dose modulation.     INDICATION: Dizziness, fall with head  trauma      Unenhanced Head CT shows no mass, bleed, shift, hydrocephalus or extra-axial  fluid collection. No acute infarct is apparent. Cerebral white matter  hypodensity is noted, favoring chronic microangiopathy. Bone windows are  unremarkable.     IMPRESSION  IMPRESSION: No acute intracranial disease. CT cervical spine without contrast:  There is no fracture or significant subluxation. There is multilevel  degenerative disc and facet disease, most severe at C7-T1.  There is no  prevertebral soft tissue swelling.     IMPRESSION: No fracture. Multilevel degenerative disc disease.        Assessment and Plan   1. Dehydration. Admit to telemetry with noted hyperkalemia. Ordered 0.9% normal saline 1000 ml IV fluid bolus stat, followed by 0.9% normal saline IV fluid maintenance at 125 ml/hr. Place on strict Is/Os/ and daily weights. Repeat renal panel after noted interventions. 2.  Acute hyperkalemia. Plan IV fluid hydration. ER already ordered Kayexalate. Will place on telemetry monitoring. Repeat K level after interventions. 3.  Laceration of head/ scalp- s/p wound suture repair. Continue wound cares. 4.  S/p fall. Etiology uncertain. Place on fall precautions. Check TSH level; place add-on test for the same. Recheck orthostatic vital signs in the a.m.    5.  Generalized weakness. Plan as above. Consult PT. 6.  Anemia. Ordered stool occult blood test, iron profile, B12, and folate levels. Repeat CBC in a.m.    7.  Parkinson's disease. Order home dose of Sinemet. 8.  Hypertension. Resume home dose of Amlodipine and Hydralazine. Monitor BP closely. Adjust antibiotics accordingly. 9. VTE prophylaxis. SCDs to BLEs.            Signed by: Jamie Scott MD    December 11, 2017 at 8:58 PM Alert-The patient is alert, awake and responds to voice. The patient is oriented to time, place, and person. The triage nurse is able to obtain subjective information.

## 2018-11-03 ENCOUNTER — APPOINTMENT (OUTPATIENT)
Dept: GENERAL RADIOLOGY | Age: 83
End: 2018-11-03
Attending: EMERGENCY MEDICINE
Payer: MEDICARE

## 2018-11-03 ENCOUNTER — HOSPITAL ENCOUNTER (EMERGENCY)
Age: 83
Discharge: HOME | End: 2018-11-03
Attending: EMERGENCY MEDICINE | Admitting: EMERGENCY MEDICINE
Payer: MEDICARE

## 2018-11-03 ENCOUNTER — APPOINTMENT (OUTPATIENT)
Dept: CT IMAGING | Age: 83
End: 2018-11-03
Attending: EMERGENCY MEDICINE
Payer: MEDICARE

## 2018-11-03 VITALS
RESPIRATION RATE: 18 BRPM | HEIGHT: 64 IN | OXYGEN SATURATION: 98 % | TEMPERATURE: 97.8 F | DIASTOLIC BLOOD PRESSURE: 54 MMHG | SYSTOLIC BLOOD PRESSURE: 166 MMHG | BODY MASS INDEX: 21.34 KG/M2 | WEIGHT: 125 LBS | HEART RATE: 60 BPM

## 2018-11-03 DIAGNOSIS — N30.00 ACUTE CYSTITIS WITHOUT HEMATURIA: ICD-10-CM

## 2018-11-03 DIAGNOSIS — R41.0 ACUTE DELIRIUM: Primary | ICD-10-CM

## 2018-11-03 LAB
ANION GAP SERPL CALC-SCNC: 7 MMOL/L (ref 5–15)
APPEARANCE UR: ABNORMAL
BACTERIA URNS QL MICRO: ABNORMAL /HPF
BASOPHILS # BLD: 0 K/UL (ref 0–0.1)
BASOPHILS NFR BLD: 1 % (ref 0–1)
BILIRUB UR QL: NEGATIVE
BUN SERPL-MCNC: 44 MG/DL (ref 6–20)
BUN/CREAT SERPL: 27 (ref 12–20)
CALCIUM SERPL-MCNC: 8.3 MG/DL (ref 8.5–10.1)
CHLORIDE SERPL-SCNC: 116 MMOL/L (ref 97–108)
CO2 SERPL-SCNC: 20 MMOL/L (ref 21–32)
COLOR UR: ABNORMAL
COMMENT, HOLDF: NORMAL
COMMENT, HOLDF: NORMAL
CREAT SERPL-MCNC: 1.62 MG/DL (ref 0.55–1.02)
DIFFERENTIAL METHOD BLD: ABNORMAL
EOSINOPHIL # BLD: 0.2 K/UL (ref 0–0.4)
EOSINOPHIL NFR BLD: 3 % (ref 0–7)
EPITH CASTS URNS QL MICRO: ABNORMAL /LPF
ERYTHROCYTE [DISTWIDTH] IN BLOOD BY AUTOMATED COUNT: 15.8 % (ref 11.5–14.5)
GLUCOSE SERPL-MCNC: 92 MG/DL (ref 65–100)
GLUCOSE UR STRIP.AUTO-MCNC: NEGATIVE MG/DL
HCT VFR BLD AUTO: 25.2 % (ref 35–47)
HGB BLD-MCNC: 7.9 G/DL (ref 11.5–16)
HGB UR QL STRIP: NEGATIVE
IMM GRANULOCYTES # BLD: 0 K/UL (ref 0–0.04)
IMM GRANULOCYTES NFR BLD AUTO: 0 % (ref 0–0.5)
KETONES UR QL STRIP.AUTO: NEGATIVE MG/DL
LEUKOCYTE ESTERASE UR QL STRIP.AUTO: ABNORMAL
LYMPHOCYTES # BLD: 1.3 K/UL (ref 0.8–3.5)
LYMPHOCYTES NFR BLD: 20 % (ref 12–49)
MAGNESIUM SERPL-MCNC: 2 MG/DL (ref 1.6–2.4)
MCH RBC QN AUTO: 31.6 PG (ref 26–34)
MCHC RBC AUTO-ENTMCNC: 31.3 G/DL (ref 30–36.5)
MCV RBC AUTO: 100.8 FL (ref 80–99)
MONOCYTES # BLD: 0.5 K/UL (ref 0–1)
MONOCYTES NFR BLD: 8 % (ref 5–13)
NEUTS SEG # BLD: 4.4 K/UL (ref 1.8–8)
NEUTS SEG NFR BLD: 69 % (ref 32–75)
NITRITE UR QL STRIP.AUTO: NEGATIVE
NRBC # BLD: 0 K/UL (ref 0–0.01)
NRBC BLD-RTO: 0 PER 100 WBC
OTHER,OTHU: ABNORMAL
PH UR STRIP: 6.5 [PH] (ref 5–8)
PLATELET # BLD AUTO: 279 K/UL (ref 150–400)
PMV BLD AUTO: 10.7 FL (ref 8.9–12.9)
POTASSIUM SERPL-SCNC: 5.6 MMOL/L (ref 3.5–5.1)
PROT UR STRIP-MCNC: 300 MG/DL
RBC # BLD AUTO: 2.5 M/UL (ref 3.8–5.2)
RBC #/AREA URNS HPF: ABNORMAL /HPF (ref 0–5)
SAMPLES BEING HELD,HOLD: NORMAL
SAMPLES BEING HELD,HOLD: NORMAL
SODIUM SERPL-SCNC: 143 MMOL/L (ref 136–145)
SP GR UR REFRACTOMETRY: 1.02 (ref 1–1.03)
UROBILINOGEN UR QL STRIP.AUTO: 0.2 EU/DL (ref 0.2–1)
WBC # BLD AUTO: 6.4 K/UL (ref 3.6–11)
WBC URNS QL MICRO: ABNORMAL /HPF (ref 0–4)

## 2018-11-03 PROCEDURE — 96365 THER/PROPH/DIAG IV INF INIT: CPT

## 2018-11-03 PROCEDURE — 80048 BASIC METABOLIC PNL TOTAL CA: CPT | Performed by: EMERGENCY MEDICINE

## 2018-11-03 PROCEDURE — 77030011943

## 2018-11-03 PROCEDURE — 99285 EMERGENCY DEPT VISIT HI MDM: CPT

## 2018-11-03 PROCEDURE — 81001 URINALYSIS AUTO W/SCOPE: CPT | Performed by: EMERGENCY MEDICINE

## 2018-11-03 PROCEDURE — 85025 COMPLETE CBC W/AUTO DIFF WBC: CPT | Performed by: EMERGENCY MEDICINE

## 2018-11-03 PROCEDURE — 96366 THER/PROPH/DIAG IV INF ADDON: CPT

## 2018-11-03 PROCEDURE — 87186 SC STD MICRODIL/AGAR DIL: CPT | Performed by: EMERGENCY MEDICINE

## 2018-11-03 PROCEDURE — 73030 X-RAY EXAM OF SHOULDER: CPT

## 2018-11-03 PROCEDURE — 74011000258 HC RX REV CODE- 258: Performed by: EMERGENCY MEDICINE

## 2018-11-03 PROCEDURE — 73200 CT UPPER EXTREMITY W/O DYE: CPT

## 2018-11-03 PROCEDURE — 36415 COLL VENOUS BLD VENIPUNCTURE: CPT | Performed by: EMERGENCY MEDICINE

## 2018-11-03 PROCEDURE — 74011250636 HC RX REV CODE- 250/636: Performed by: EMERGENCY MEDICINE

## 2018-11-03 PROCEDURE — 83735 ASSAY OF MAGNESIUM: CPT | Performed by: EMERGENCY MEDICINE

## 2018-11-03 PROCEDURE — 87086 URINE CULTURE/COLONY COUNT: CPT | Performed by: EMERGENCY MEDICINE

## 2018-11-03 PROCEDURE — 96361 HYDRATE IV INFUSION ADD-ON: CPT

## 2018-11-03 PROCEDURE — 87077 CULTURE AEROBIC IDENTIFY: CPT | Performed by: EMERGENCY MEDICINE

## 2018-11-03 RX ORDER — LIDOCAINE HYDROCHLORIDE 20 MG/ML
0.3 INJECTION, SOLUTION INFILTRATION; PERINEURAL ONCE
Status: DISCONTINUED | OUTPATIENT
Start: 2018-11-03 | End: 2018-11-03 | Stop reason: HOSPADM

## 2018-11-03 RX ORDER — CEPHALEXIN 500 MG/1
500 CAPSULE ORAL 4 TIMES DAILY
Qty: 28 CAP | Refills: 0 | Status: SHIPPED | OUTPATIENT
Start: 2018-11-03 | End: 2018-11-08

## 2018-11-03 RX ADMIN — SODIUM CHLORIDE 1000 ML: 900 INJECTION, SOLUTION INTRAVENOUS at 11:24

## 2018-11-03 RX ADMIN — CEFTRIAXONE 1 G: 1 INJECTION, POWDER, FOR SOLUTION INTRAMUSCULAR; INTRAVENOUS at 12:11

## 2018-11-03 NOTE — DISCHARGE INSTRUCTIONS
Urinary Tract Infection in Women: Care Instructions  Your Care Instructions    A urinary tract infection, or UTI, is a general term for an infection anywhere between the kidneys and the urethra (where urine comes out). Most UTIs are bladder infections. They often cause pain or burning when you urinate. UTIs are caused by bacteria and can be cured with antibiotics. Be sure to complete your treatment so that the infection goes away. Follow-up care is a key part of your treatment and safety. Be sure to make and go to all appointments, and call your doctor if you are having problems. It's also a good idea to know your test results and keep a list of the medicines you take. How can you care for yourself at home? · Take your antibiotics as directed. Do not stop taking them just because you feel better. You need to take the full course of antibiotics. · Drink extra water and other fluids for the next day or two. This may help wash out the bacteria that are causing the infection. (If you have kidney, heart, or liver disease and have to limit fluids, talk with your doctor before you increase your fluid intake.)  · Avoid drinks that are carbonated or have caffeine. They can irritate the bladder. · Urinate often. Try to empty your bladder each time. · To relieve pain, take a hot bath or lay a heating pad set on low over your lower belly or genital area. Never go to sleep with a heating pad in place. To prevent UTIs  · Drink plenty of water each day. This helps you urinate often, which clears bacteria from your system. (If you have kidney, heart, or liver disease and have to limit fluids, talk with your doctor before you increase your fluid intake.)  · Urinate when you need to. · Urinate right after you have sex. · Change sanitary pads often. · Avoid douches, bubble baths, feminine hygiene sprays, and other feminine hygiene products that have deodorants.   · After going to the bathroom, wipe from front to back.  When should you call for help? Call your doctor now or seek immediate medical care if:    · Symptoms such as fever, chills, nausea, or vomiting get worse or appear for the first time.     · You have new pain in your back just below your rib cage. This is called flank pain.     · There is new blood or pus in your urine.     · You have any problems with your antibiotic medicine.    Watch closely for changes in your health, and be sure to contact your doctor if:    · You are not getting better after taking an antibiotic for 2 days.     · Your symptoms go away but then come back. Where can you learn more? Go to http://keyanna-eliazar.info/. Enter T438 in the search box to learn more about \"Urinary Tract Infection in Women: Care Instructions. \"  Current as of: March 21, 2018  Content Version: 11.8  © 2462-6115 [employer]. Care instructions adapted under license by [employer] (which disclaims liability or warranty for this information). If you have questions about a medical condition or this instruction, always ask your healthcare professional. [address] any warranty or liability for your use of this information.

## 2018-11-03 NOTE — ED TRIAGE NOTES
Pt from [address] [place] [address] from [facility]. . Unwitnessed fall this AM. X-ray confirmed dislocated left shoulder. Pt A & O x 2.

## 2018-11-03 NOTE — ED NOTES
I have reviewed discharge instructions with the daughter. The daughter verbalized understanding. [facility] called to arrange transportation back to facility. SBAR report called back to facility.

## 2018-11-03 NOTE — ED PROVIDER NOTES
The history is provided by the EMS personnel and medical records. The history is limited by the condition of the patient. No  was used. 80 y.o. female with past medical history significant for HTN, arthritis, thyroid disease, Parkinson's disease, hyperlipidemia, and CKD who presents from [facility] [address] via [facility] with chief complaint of shoulder injury. Patient is unable to provide history. History provided by medical records and personnel. Per ambulance services personnel, patient had an unwitnessed GLF yesterday evening while ambulating to bathroom using walker. Xray shows anterior dislocation of left shoulder. No other history provided. There are no other acute medical concerns at this time. Full history, physical exam, and ROS unable to be obtained due to:  dementia. Old Chart Review: 
Last ED Visit 12/11/2017 for dehydration, hyperkalemia, and scalp laceration from fall. Social hx: Never tobacco smoker; Denies EtOH use; Denies illicit drug use PCP: Karlis Duverney, MD 
 
Note written by Marilu Bonilla, as dictated by Brian Adkins DO 10:19 AM 
 
Past Medical History:  
Diagnosis Date  Arthritis  Chronic kidney disease RENAL MASS  Environmental allergies  HTN (hypertension) 1/19/2012  Hyperlipemia 1/19/2012  Hypertension  Other ill-defined conditions(799.89)   
 parkinson disease  Parkinson disease ([facility] [address] 1/19/2012  S/P radioactive iodine thyroid ablation 1/19/2012  S/P NINI-BSO 1/19/2012  S/P TKR (total knee replacement) 1/19/2012  Thyroid disease   
 goiter/hyperthyroid (s/p iodine tx 1999)  Unspecified adverse effect of anesthesia CLAUSTROPHOBIA Past Surgical History:  
Procedure Laterality Date  ENDOSCOPY, COLON, DIAGNOSTIC  7/2011  
 ([facility])  HX GI    
 COLONOSCOPY  
 HX ORTHOPAEDIC    
 r hip, l knee replacement 2008 [id number] [facility] Family History:  
Problem Relation Age of Onset  Coronary Artery Disease Father   
     s/p MI  
 Heart Disease Father  Elevated Lipids Father  Cancer Brother   
     hodgkins  Anesth Problems Neg Hx Social History Socioeconomic History  Marital status:  Spouse name: Not on file  Number of children: Not on file  Years of education: Not on file  Highest education level: Not on file Social Needs  Financial resource strain: Not on file  Food insecurity - worry: Not on file  Food insecurity - inability: Not on file  Transportation needs - medical: Not on file  Transportation needs - non-medical: Not on file Occupational History  Not on file Tobacco Use  Smoking status: Never Smoker  Smokeless tobacco: Never Used Substance and Sexual Activity  Alcohol use: No  
  Alcohol/week: 0.0 oz  Drug use: No  
 Sexual activity: No  
Other Topics Concern  Not on file Social History Narrative  Not on file ALLERGIES: Bactroban [mupirocin calcium]; Betadine [povidone-iodine]; Comtan [entacapone]; Erythromycin; Penicillins; and Sulfa (sulfonamide antibiotics) Review of Systems Unable to perform ROS: Dementia Vitals:  
 11/03/18 1023 11/03/18 1031 Temp: 98.7 °F (37.1 °C) 99.8 °F (37.7 °C) Weight: 56.7 kg (125 lb) Height: 5' 4\" (1.626 m) Physical Exam  
Constitutional: No distress. Frail, elderly, awake HENT:  
Head: Normocephalic and atraumatic. Eyes: Conjunctivae are normal. Pupils are equal, round, and reactive to light. Neck: No tracheal deviation present. Cardiovascular: Normal rate, regular rhythm and intact distal pulses. Pulmonary/Chest: Effort normal and breath sounds normal.  
Abdominal: Soft. She exhibits no distension and no mass. There is no tenderness. There is no guarding. Musculoskeletal:  
Has some ROM to L shoulder. Deformed but it looks chronic.   Does not seem to be that painful. Neurological: She is alert. Skin: Skin is warm and dry. She is not diaphoretic. MDM Procedures 11:10 AM 
Dr. Dominik Aamnda spoke with Dr. Aldo Armenta regarding patient's shoulder Xray. Dr. Aldo Armenta reports severe deformity with chronic dislocation. 2:24 PM - I met daughter. Last month, went to [facility] and had a UTI. She had confusion similar to today's sx. BP is stable. Had CTX. I am going to DC her back to the [id number] [place] [address] Road. She was treated as an outpt last month as well. Labs Reviewed CULTURE, URINE - Abnormal; Notable for the following components:  
    Result Value Culture result: GRAM NEGATIVE RODS (*) All other components within normal limits URINALYSIS W/MICROSCOPIC - Abnormal; Notable for the following components:  
 Appearance TURBID (*) Protein 300 (*) Leukocyte Esterase MODERATE (*) Epithelial cells MODERATE (*) Bacteria 4+ (*) All other components within normal limits METABOLIC PANEL, BASIC - Abnormal; Notable for the following components:  
 Potassium 5.6 (*) Chloride 116 (*)   
 CO2 20 (*) BUN 44 (*) Creatinine 1.62 (*)   
 BUN/Creatinine ratio 27 (*)   
 GFR est AA 36 (*)   
 GFR est non-AA 30 (*) Calcium 8.3 (*) All other components within normal limits CBC WITH AUTOMATED DIFF - Abnormal; Notable for the following components: RBC 2.50 (*) HGB 7.9 (*) HCT 25.2 (*) .8 (*)   
 RDW 15.8 (*) All other components within normal limits SAMPLES BEING HELD MAGNESIUM  
SAMPLES BEING HELD  
 
CT shows no dislocation.

## 2018-11-06 ENCOUNTER — APPOINTMENT (OUTPATIENT)
Dept: GENERAL RADIOLOGY | Age: 83
End: 2018-11-06
Attending: EMERGENCY MEDICINE
Payer: MEDICARE

## 2018-11-06 ENCOUNTER — HOSPITAL ENCOUNTER (OUTPATIENT)
Age: 83
Setting detail: OBSERVATION
Discharge: NURSING, REHAB OR LONG-TERM CARE | End: 2018-11-08
Attending: EMERGENCY MEDICINE | Admitting: HOSPITALIST
Payer: MEDICARE

## 2018-11-06 ENCOUNTER — APPOINTMENT (OUTPATIENT)
Dept: CT IMAGING | Age: 83
End: 2018-11-06
Attending: EMERGENCY MEDICINE
Payer: MEDICARE

## 2018-11-06 DIAGNOSIS — S09.90XA CLOSED HEAD INJURY, INITIAL ENCOUNTER: ICD-10-CM

## 2018-11-06 DIAGNOSIS — G20 DEMENTIA DUE TO PARKINSON'S DISEASE WITHOUT BEHAVIORAL DISTURBANCE (HCC): ICD-10-CM

## 2018-11-06 DIAGNOSIS — S01.01XA LACERATION OF SCALP, INITIAL ENCOUNTER: ICD-10-CM
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
## 2018-11-06 DIAGNOSIS — F02.80 DEMENTIA DUE TO PARKINSON'S DISEASE WITHOUT BEHAVIORAL DISTURBANCE (HCC): ICD-10-CM

## 2018-11-06 LAB
ALBUMIN SERPL-MCNC: 2.9 G/DL (ref 3.5–5)
ALBUMIN/GLOB SERPL: 0.7 {RATIO} (ref 1.1–2.2)
ALP SERPL-CCNC: 112 U/L (ref 45–117)
ALT SERPL-CCNC: 9 U/L (ref 12–78)
AMORPH CRY URNS QL MICRO: ABNORMAL
ANION GAP SERPL CALC-SCNC: 8 MMOL/L (ref 5–15)
APPEARANCE UR: ABNORMAL
AST SERPL-CCNC: 16 U/L (ref 15–37)
BACTERIA SPEC CULT: ABNORMAL
BACTERIA SPEC CULT: ABNORMAL
BACTERIA URNS QL MICRO: NEGATIVE /HPF
BASOPHILS # BLD: 0 K/UL (ref 0–0.1)
BASOPHILS NFR BLD: 0 % (ref 0–1)
BILIRUB SERPL-MCNC: 0.3 MG/DL (ref 0.2–1)
BILIRUB UR QL: NEGATIVE
BUN SERPL-MCNC: 40 MG/DL (ref 6–20)
BUN/CREAT SERPL: 24 (ref 12–20)
CALCIUM SERPL-MCNC: 8.7 MG/DL (ref 8.5–10.1)
CC UR VC: ABNORMAL
CHLORIDE SERPL-SCNC: 112 MMOL/L (ref 97–108)
CK MB CFR SERPL CALC: 2.7 % (ref 0–2.5)
CK MB SERPL-MCNC: 2.7 NG/ML (ref 5–25)
CK SERPL-CCNC: 101 U/L (ref 26–192)
CO2 SERPL-SCNC: 22 MMOL/L (ref 21–32)
COLOR UR: ABNORMAL
COMMENT, HOLDF: NORMAL
CREAT SERPL-MCNC: 1.68 MG/DL (ref 0.55–1.02)
DIFFERENTIAL METHOD BLD: ABNORMAL
EOSINOPHIL # BLD: 0.2 K/UL (ref 0–0.4)
EOSINOPHIL NFR BLD: 4 % (ref 0–7)
EPITH CASTS URNS QL MICRO: ABNORMAL /LPF
ERYTHROCYTE [DISTWIDTH] IN BLOOD BY AUTOMATED COUNT: 16.5 % (ref 11.5–14.5)
GLOBULIN SER CALC-MCNC: 4.2 G/DL (ref 2–4)
GLUCOSE SERPL-MCNC: 103 MG/DL (ref 65–100)
GLUCOSE UR STRIP.AUTO-MCNC: NEGATIVE MG/DL
GRAN CASTS URNS QL MICRO: ABNORMAL /LPF
HCT VFR BLD AUTO: 29.5 % (ref 35–47)
HGB BLD-MCNC: 9 G/DL (ref 11.5–16)
HGB UR QL STRIP: NEGATIVE
IMM GRANULOCYTES # BLD: 0 K/UL (ref 0–0.04)
IMM GRANULOCYTES NFR BLD AUTO: 0 % (ref 0–0.5)
KETONES UR QL STRIP.AUTO: ABNORMAL MG/DL
LEUKOCYTE ESTERASE UR QL STRIP.AUTO: NEGATIVE
LYMPHOCYTES # BLD: 1.3 K/UL (ref 0.8–3.5)
LYMPHOCYTES NFR BLD: 22 % (ref 12–49)
MAGNESIUM SERPL-MCNC: 2.1 MG/DL (ref 1.6–2.4)
MCH RBC QN AUTO: 31.1 PG (ref 26–34)
MCHC RBC AUTO-ENTMCNC: 30.5 G/DL (ref 30–36.5)
MCV RBC AUTO: 102.1 FL (ref 80–99)
MONOCYTES # BLD: 0.5 K/UL (ref 0–1)
MONOCYTES NFR BLD: 9 % (ref 5–13)
NEUTS SEG # BLD: 3.8 K/UL (ref 1.8–8)
NEUTS SEG NFR BLD: 65 % (ref 32–75)
NITRITE UR QL STRIP.AUTO: NEGATIVE
NRBC # BLD: 0 K/UL (ref 0–0.01)
NRBC BLD-RTO: 0 PER 100 WBC
OTHER,OTHU: ABNORMAL
PH UR STRIP: 5 [PH] (ref 5–8)
PHOSPHATE SERPL-MCNC: 3.7 MG/DL (ref 2.6–4.7)
PLATELET # BLD AUTO: 269 K/UL (ref 150–400)
PMV BLD AUTO: 10.7 FL (ref 8.9–12.9)
POTASSIUM SERPL-SCNC: 4.9 MMOL/L (ref 3.5–5.1)
PROT SERPL-MCNC: 7.1 G/DL (ref 6.4–8.2)
PROT UR STRIP-MCNC: 300 MG/DL
RBC # BLD AUTO: 2.89 M/UL (ref 3.8–5.2)
RBC #/AREA URNS HPF: ABNORMAL /HPF (ref 0–5)
SAMPLES BEING HELD,HOLD: NORMAL
SERVICE CMNT-IMP: ABNORMAL
SODIUM SERPL-SCNC: 142 MMOL/L (ref 136–145)
SP GR UR REFRACTOMETRY: 1.03 (ref 1–1.03)
TROPONIN I SERPL-MCNC: <0.05 NG/ML
UR CULT HOLD, URHOLD: NORMAL
UROBILINOGEN UR QL STRIP.AUTO: 0.2 EU/DL (ref 0.2–1)
WBC # BLD AUTO: 5.9 K/UL (ref 3.6–11)
WBC URNS QL MICRO: ABNORMAL /HPF (ref 0–4)

## 2018-11-06 PROCEDURE — 36415 COLL VENOUS BLD VENIPUNCTURE: CPT | Performed by: EMERGENCY MEDICINE

## 2018-11-06 PROCEDURE — 84484 ASSAY OF TROPONIN QUANT: CPT | Performed by: EMERGENCY MEDICINE

## 2018-11-06 PROCEDURE — 74011250636 HC RX REV CODE- 250/636: Performed by: EMERGENCY MEDICINE

## 2018-11-06 PROCEDURE — 93005 ELECTROCARDIOGRAM TRACING: CPT

## 2018-11-06 PROCEDURE — 90471 IMMUNIZATION ADMIN: CPT

## 2018-11-06 PROCEDURE — 75810000293 HC SIMP/SUPERF WND  RPR

## 2018-11-06 PROCEDURE — 70450 CT HEAD/BRAIN W/O DYE: CPT

## 2018-11-06 PROCEDURE — 90715 TDAP VACCINE 7 YRS/> IM: CPT | Performed by: EMERGENCY MEDICINE

## 2018-11-06 PROCEDURE — 72125 CT NECK SPINE W/O DYE: CPT

## 2018-11-06 PROCEDURE — 85025 COMPLETE CBC W/AUTO DIFF WBC: CPT | Performed by: EMERGENCY MEDICINE

## 2018-11-06 PROCEDURE — 81001 URINALYSIS AUTO W/SCOPE: CPT | Performed by: EMERGENCY MEDICINE

## 2018-11-06 PROCEDURE — 84100 ASSAY OF PHOSPHORUS: CPT | Performed by: EMERGENCY MEDICINE

## 2018-11-06 PROCEDURE — 82550 ASSAY OF CK (CPK): CPT | Performed by: EMERGENCY MEDICINE

## 2018-11-06 PROCEDURE — 71045 X-RAY EXAM CHEST 1 VIEW: CPT

## 2018-11-06 PROCEDURE — 87086 URINE CULTURE/COLONY COUNT: CPT | Performed by: EMERGENCY MEDICINE

## 2018-11-06 PROCEDURE — 99284 EMERGENCY DEPT VISIT MOD MDM: CPT

## 2018-11-06 PROCEDURE — 77030011943

## 2018-11-06 PROCEDURE — 83735 ASSAY OF MAGNESIUM: CPT | Performed by: EMERGENCY MEDICINE

## 2018-11-06 PROCEDURE — 80053 COMPREHEN METABOLIC PANEL: CPT | Performed by: EMERGENCY MEDICINE

## 2018-11-06 PROCEDURE — 96361 HYDRATE IV INFUSION ADD-ON: CPT

## 2018-11-06 PROCEDURE — 96365 THER/PROPH/DIAG IV INF INIT: CPT

## 2018-11-06 PROCEDURE — 77030008460 HC STPLR SKN PRECIS 3M -A

## 2018-11-06 RX ORDER — SODIUM CHLORIDE 9 MG/ML
75 INJECTION, SOLUTION INTRAVENOUS CONTINUOUS
Status: DISCONTINUED | OUTPATIENT
Start: 2018-11-06 | End: 2018-11-09 | Stop reason: HOSPADM

## 2018-11-06 RX ADMIN — TETANUS TOXOID, REDUCED DIPHTHERIA TOXOID AND ACELLULAR PERTUSSIS VACCINE, ADSORBED 0.5 ML: 5; 2.5; 8; 8; 2.5 SUSPENSION INTRAMUSCULAR at 22:14

## 2018-11-06 RX ADMIN — SODIUM CHLORIDE 125 ML/HR: 900 INJECTION, SOLUTION INTRAVENOUS at 23:47
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
## 2018-11-07 LAB
ATRIAL RATE: 64 BPM
CALCULATED R AXIS, ECG10: -18 DEGREES
CALCULATED T AXIS, ECG11: 47 DEGREES
DIAGNOSIS, 93000: NORMAL
P-R INTERVAL, ECG05: 174 MS
Q-T INTERVAL, ECG07: 416 MS
QRS DURATION, ECG06: 80 MS
QTC CALCULATION (BEZET), ECG08: 429 MS
VENTRICULAR RATE, ECG03: 64 BPM

## 2018-11-07 PROCEDURE — 99218 HC RM OBSERVATION: CPT

## 2018-11-07 PROCEDURE — 74011250637 HC RX REV CODE- 250/637: Performed by: HOSPITALIST

## 2018-11-07 PROCEDURE — 74011250636 HC RX REV CODE- 250/636: Performed by: EMERGENCY MEDICINE

## 2018-11-07 PROCEDURE — 97162 PT EVAL MOD COMPLEX 30 MIN: CPT

## 2018-11-07 PROCEDURE — G8989 SELF CARE D/C STATUS: HCPCS

## 2018-11-07 PROCEDURE — G8978 MOBILITY CURRENT STATUS: HCPCS

## 2018-11-07 PROCEDURE — 96361 HYDRATE IV INFUSION ADD-ON: CPT

## 2018-11-07 PROCEDURE — 97535 SELF CARE MNGMENT TRAINING: CPT

## 2018-11-07 PROCEDURE — G8979 MOBILITY GOAL STATUS: HCPCS

## 2018-11-07 PROCEDURE — G8988 SELF CARE GOAL STATUS: HCPCS

## 2018-11-07 PROCEDURE — 97165 OT EVAL LOW COMPLEX 30 MIN: CPT

## 2018-11-07 PROCEDURE — G8987 SELF CARE CURRENT STATUS: HCPCS

## 2018-11-07 PROCEDURE — 77030003560 HC NDL HUBR BARD -A

## 2018-11-07 PROCEDURE — 74011000258 HC RX REV CODE- 258: Performed by: EMERGENCY MEDICINE

## 2018-11-07 PROCEDURE — 97530 THERAPEUTIC ACTIVITIES: CPT

## 2018-11-07 RX ORDER — SODIUM CHLORIDE 0.9 % (FLUSH) 0.9 %
5-10 SYRINGE (ML) INJECTION EVERY 8 HOURS
Status: DISCONTINUED | OUTPATIENT
Start: 2018-11-07 | End: 2018-11-09 | Stop reason: HOSPADM

## 2018-11-07 RX ORDER — CEPHALEXIN 250 MG/1
500 CAPSULE ORAL 3 TIMES DAILY
Status: DISCONTINUED | OUTPATIENT
Start: 2018-11-07 | End: 2018-11-07

## 2018-11-07 RX ORDER — SODIUM CHLORIDE 0.9 % (FLUSH) 0.9 %
5-10 SYRINGE (ML) INJECTION AS NEEDED
Status: DISCONTINUED | OUTPATIENT
Start: 2018-11-07 | End: 2018-11-09 | Stop reason: HOSPADM

## 2018-11-07 RX ORDER — SODIUM CHLORIDE 0.9 % (FLUSH) 0.9 %
20 SYRINGE (ML) INJECTION AS NEEDED
Status: DISCONTINUED | OUTPATIENT
Start: 2018-11-07 | End: 2018-11-09 | Stop reason: HOSPADM

## 2018-11-07 RX ORDER — ONDANSETRON 2 MG/ML
4 INJECTION INTRAMUSCULAR; INTRAVENOUS
Status: DISCONTINUED | OUTPATIENT
Start: 2018-11-07 | End: 2018-11-09 | Stop reason: HOSPADM

## 2018-11-07 RX ORDER — CEPHALEXIN 250 MG/1
250 CAPSULE ORAL 3 TIMES DAILY
Status: DISCONTINUED | OUTPATIENT
Start: 2018-11-07 | End: 2018-11-09 | Stop reason: HOSPADM

## 2018-11-07 RX ORDER — HYDRALAZINE HYDROCHLORIDE 25 MG/1
25 TABLET, FILM COATED ORAL 2 TIMES DAILY
Status: DISCONTINUED | OUTPATIENT
Start: 2018-11-07 | End: 2018-11-09 | Stop reason: HOSPADM

## 2018-11-07 RX ORDER — SODIUM CHLORIDE 450 MG/100ML
75 INJECTION, SOLUTION INTRAVENOUS CONTINUOUS
Status: CANCELLED | OUTPATIENT
Start: 2018-11-07

## 2018-11-07 RX ORDER — DOCUSATE SODIUM 50 MG/5ML
50 LIQUID ORAL DAILY
Status: DISCONTINUED | OUTPATIENT
Start: 2018-11-07 | End: 2018-11-09 | Stop reason: HOSPADM

## 2018-11-07 RX ORDER — CARBIDOPA AND LEVODOPA 25; 100 MG/1; MG/1
2 TABLET ORAL 4 TIMES DAILY
Status: DISCONTINUED | OUTPATIENT
Start: 2018-11-07 | End: 2018-11-09 | Stop reason: HOSPADM

## 2018-11-07 RX ORDER — ACETAMINOPHEN 325 MG/1
650 TABLET ORAL
Status: DISCONTINUED | OUTPATIENT
Start: 2018-11-07 | End: 2018-11-09 | Stop reason: HOSPADM

## 2018-11-07 RX ORDER — LOSARTAN POTASSIUM 25 MG/1
12.5 TABLET ORAL DAILY
Status: DISCONTINUED | OUTPATIENT
Start: 2018-11-07 | End: 2018-11-09 | Stop reason: HOSPADM

## 2018-11-07 RX ORDER — SODIUM CHLORIDE 0.9 % (FLUSH) 0.9 %
10 SYRINGE (ML) INJECTION AS NEEDED
Status: DISCONTINUED | OUTPATIENT
Start: 2018-11-07 | End: 2018-11-09 | Stop reason: HOSPADM

## 2018-11-07 RX ORDER — SODIUM CHLORIDE 0.9 % (FLUSH) 0.9 %
10 SYRINGE (ML) INJECTION EVERY 24 HOURS
Status: DISCONTINUED | OUTPATIENT
Start: 2018-11-07 | End: 2018-11-09 | Stop reason: HOSPADM

## 2018-11-07 RX ORDER — CARVEDILOL 3.12 MG/1
3.12 TABLET ORAL 2 TIMES DAILY WITH MEALS
Status: DISCONTINUED | OUTPATIENT
Start: 2018-11-07 | End: 2018-11-09 | Stop reason: HOSPADM

## 2018-11-07 RX ORDER — LOVASTATIN 20 MG/1
20 TABLET ORAL DAILY
Status: DISCONTINUED | OUTPATIENT
Start: 2018-11-07 | End: 2018-11-09 | Stop reason: HOSPADM

## 2018-11-07 RX ORDER — SODIUM CHLORIDE 0.9 % (FLUSH) 0.9 %
10 SYRINGE (ML) INJECTION EVERY 8 HOURS
Status: DISCONTINUED | OUTPATIENT
Start: 2018-11-07 | End: 2018-11-09 | Stop reason: HOSPADM

## 2018-11-07 RX ADMIN — SODIUM CHLORIDE 125 ML/HR: 900 INJECTION, SOLUTION INTRAVENOUS at 17:11

## 2018-11-07 RX ADMIN — CARBIDOPA AND LEVODOPA 2 TABLET: 25; 100 TABLET ORAL at 17:12

## 2018-11-07 RX ADMIN — DOCUSATE SODIUM 50 MG: 50 LIQUID ORAL at 09:32

## 2018-11-07 RX ADMIN — ACETAMINOPHEN 650 MG: 325 TABLET ORAL at 05:29

## 2018-11-07 RX ADMIN — CARBIDOPA AND LEVODOPA 2 TABLET: 25; 100 TABLET ORAL at 09:37

## 2018-11-07 RX ADMIN — CARBIDOPA AND LEVODOPA 2 TABLET: 25; 100 TABLET ORAL at 12:41

## 2018-11-07 RX ADMIN — CARVEDILOL 3.12 MG: 3.12 TABLET, FILM COATED ORAL at 07:18

## 2018-11-07 RX ADMIN — HYDRALAZINE HYDROCHLORIDE 25 MG: 25 TABLET, FILM COATED ORAL at 17:12

## 2018-11-07 RX ADMIN — Medication 10 ML: at 09:32

## 2018-11-07 RX ADMIN — CEPHALEXIN 250 MG: 250 CAPSULE ORAL at 21:31

## 2018-11-07 RX ADMIN — CARVEDILOL 3.12 MG: 3.12 TABLET, FILM COATED ORAL at 17:12

## 2018-11-07 RX ADMIN — CARBIDOPA AND LEVODOPA 2 TABLET: 25; 100 TABLET ORAL at 21:31

## 2018-11-07 RX ADMIN — HYDRALAZINE HYDROCHLORIDE 25 MG: 25 TABLET, FILM COATED ORAL at 09:32

## 2018-11-07 RX ADMIN — Medication 1 CAPSULE: at 09:31

## 2018-11-07 RX ADMIN — LOSARTAN POTASSIUM 12.5 MG: 25 TABLET ORAL at 09:32

## 2018-11-07 RX ADMIN — LOVASTATIN 20 MG: 20 TABLET ORAL at 09:31

## 2018-11-07 RX ADMIN — SODIUM CHLORIDE 1000 ML: 900 INJECTION, SOLUTION INTRAVENOUS at 01:55

## 2018-11-07 RX ADMIN — CEFTRIAXONE 1 G: 1 INJECTION, POWDER, FOR SOLUTION INTRAMUSCULAR; INTRAVENOUS at 02:34

## 2018-11-07 RX ADMIN — ACETAMINOPHEN 650 MG: 325 TABLET ORAL at 09:32

## 2018-11-07 NOTE — PROGRESS NOTES
Problem: Self Care Deficits Care Plan (Adult) Goal: *Acute Goals and Plan of Care (Insert Text) Occupational Therapy Goals Initiated 11/7/2018 1. Patient will perform self-feeding with independence within 7 day(s). 2.  Patient will perform grooming with supervision/set-up within 7 day(s). 3.  Patient will perform bathing with moderate assistance  within 7 day(s). 4.  Patient will perform toilet transfers with minimal assistance/contact guard assist within 7 day(s). 5.  Patient will perform all aspects of toileting with minimal assistance/contact guard assist within 7 day(s). Occupational Therapy EVALUATION Patient: Anna Guzman (81 y.o. female) Date: 11/7/2018 Primary Diagnosis: Fall Precautions: Falls ASSESSMENT : 
Based on the objective data described below, the patient presents with decrease sit/stand balance, decrease cognition, decrease activity tolerance and decrease overall weakness. Pt presents at max to total assist level with LB self-care and toileting. Mod assist with functional moblity using  RW for transfers, however, poor foot placement and increase verbal/tactile cues for safety. Uncertain of pt's level of care PTA. Will con't to follow. Recommend pt returning back to LTAC once medically stable. Patient will benefit from skilled intervention to address the above impairments. Patients rehabilitation potential is considered to be Good Factors which may influence rehabilitation potential include:  
[]             None noted [x]             Mental ability/status [x]             Medical condition []             Home/family situation and support systems []             Safety awareness []             Pain tolerance/management 
[]             Other: PLAN : 
Recommendations and Planned Interventions: 
[x]               Self Care Training                  [x]        Therapeutic Activities [x]               Functional Mobility Training    []        Cognitive Retraining 
[]               Therapeutic Exercises           [x]        Endurance Activities [x]               Balance Training                   []        Neuromuscular Re-Education []               Visual/Perceptual Training     []   Home Safety Training 
[x]               Patient Education                 []        Family Training/Education []               Other (comment): Frequency/Duration: Patient will be followed by occupational therapy 3 times a week to address goals. Discharge Recommendations: LTAC Further Equipment Recommendations for Discharge: TBD SUBJECTIVE:  
Patient stated Radha Beatty was going to live with my sister.  OBJECTIVE DATA SUMMARY:  
HISTORY:  
Past Medical History:  
Diagnosis Date  Arthritis  Chronic kidney disease RENAL MASS  Environmental allergies  HTN (hypertension) 1/19/2012  Hyperlipemia 1/19/2012  Hypertension  Other ill-defined conditions(799.89)   
 parkinson disease  Parkinson disease ([facility] [address] 1/19/2012  S/P radioactive iodine thyroid ablation 1/19/2012  S/P NINI-BSO 1/19/2012  S/P TKR (total knee replacement) 1/19/2012  Thyroid disease   
 goiter/hyperthyroid (s/p iodine tx 1999)  Unspecified adverse effect of anesthesia CLAUSTROPHOBIA Past Surgical History:  
Procedure Laterality Date  ENDOSCOPY, COLON, DIAGNOSTIC  7/2011  
 ([place])  HX GI    
 COLONOSCOPY  
 HX ORTHOPAEDIC    
 r hip, l knee replacement 2008 [facility] Prior Level of Function/Environment/Context: LTAC Occupations in which the patient is/was successful, what are the barriers preventing that success:  
Performance Patterns (routines, roles, habits, and rituals):  
Personal Interests and/or values:  
Expanded or extensive additional review of patient history:  
 
Home Situation Home Environment: Long term care EXAMINATION OF PERFORMANCE DEFICITS: 
Cognitive/Behavioral Status: 
Neurologic State: Alert Orientation Level: Disoriented to place; Disoriented to situation;Disoriented to time Cognition: Follows commands Skin: intact Edema: 
 
Hearing: Auditory Auditory Impairment: None Vision/Perceptual:   
    
    
    
  
    
    
  
Range of Motion: 
 
AROM: Grossly decreased, non-functional 
  
  
  
  
  
  
  
Strength: 
 
Strength: Grossly decreased, non-functional 
  
  
  
  
Coordination: 
Coordination: Grossly decreased, non-functional 
Fine Motor Skills-Upper: Left Impaired;Right Impaired Gross Motor Skills-Upper: Left Impaired;Right Impaired Tone & Sensation: 
 
Tone: Normal 
  
  
  
  
  
  
  
Balance: 
Sitting: Impaired Standing: Impaired; With support Functional Mobility and Transfers for ADLs:Bed Mobility: 
Rolling: Moderate assistance;Assist x1 Supine to Sit: Moderate assistance;Assist x1 Transfers: 
Sit to Stand: Moderate assistance;Assist x2 Stand to Sit: Moderate assistance;Assist x2 Bed to Chair: Moderate assistance;Assist x2 ADL Assessment: 
Feeding: Minimum assistance Oral Facial Hygiene/Grooming: Minimum assistance Bathing: Maximum assistance Upper Body Dressing: Minimum assistance Lower Body Dressing: Total assistance Toileting: Total assistance ADL Intervention and task modifications: 
  
 
  
 
 
Functional Measure: 
Barthel Index: 
 
Bathin Bladder: 0 Bowels: 0 Groomin Dressin Feedin Mobility: 0 Stairs: 0 Toilet Use: 0 Transfer (Bed to Chair and Back): 5 Total: 10 Barthel and G-code impairment scale: 
Percentage of impairment CH 
0% CI 
1-19% CJ 
20-39% CK 
40-59% CL 
60-79% CM 
80-99% CN 
100% Barthel Score 0-100 100 99-80 79-60 59-40 20-39 1-19 
 0 Barthel Score 0-20 20 17-19 13-16 9-12 5-8 1-4 0 The Barthel ADL Index: Guidelines 1. The index should be used as a record of what a patient does, not as a record of what a patient could do. 2. The main aim is to establish degree of independence from any help, physical or verbal, however minor and for whatever reason. 3. The need for supervision renders the patient not independent. 4. A patient's performance should be established using the best available evidence. Asking the patient, friends/relatives and nurses are the usual sources, but direct observation and common sense are also important. However direct testing is not needed. 5. Usually the patient's performance over the preceding 24-48 hours is important, but occasionally longer periods will be relevant. 6. Middle categories imply that the patient supplies over 50 per cent of the effort. 7. Use of aids to be independent is allowed. Arland Kocher., Barthel, D.W. (3927). Functional evaluation: the Barthel Index. [facility] (14)2. [facility] nate AAYUSH Faustin, Sheila Roa., Jamin Vigil., [place], [address] [place] [address] (1999). Measuring the change indisability after inpatient rehabilitation; comparison of the responsiveness of the Barthel Index and Functional [place] Measure. Journal of Neurology, Neurosurgery, and Psychiatry, 66(4), 368-112. Nixon Pace, N.J.A, SARAH Hemphill, & Kate Jackson, MTaylorA. (2004.) Assessment of post-stroke quality of life in cost-effectiveness studies: The usefulness of the Barthel Index and the EuroQoL-5D. [facility], 13, 858-80 G codes: In compliance with CMSs Claims Based Outcome Reporting, the following G-code set was chosen for this patient based on their primary functional limitation being treated: The outcome measure chosen to determine the severity of the functional limitation was the Barthel with a score of 10/100 which was correlated with the impairment scale. ? Self Care:  
  - CURRENT STATUS: CI - 1%-19% impaired, limited or restricted  - GOAL STATUS: CK - 40%-59% impaired, limited or restricted   - D/C STATUS:  ---------------To be determined---------------  
 
 Occupational Therapy Evaluation Charge Determination History Examination Decision-Making LOW Complexity : Brief history review  LOW Complexity : 1-3 performance deficits relating to physical, cognitive , or psychosocial skils that result in activity limitations and / or participation restrictions  LOW Complexity : No comorbidities that affect functional and no verbal or physical assistance needed to complete eval tasks Based on the above components, the patient evaluation is determined to be of the following complexity level: LOW Pain: 
Pain Scale 1: Numeric (0 - 10) Pain Intensity 1: 0 Pain Location 1: Head 
Pain Orientation 1: Anterior Pain Description 1: Aching Pain Intervention(s) 1: Medication (see MAR) Activity Tolerance:  
Fair Please refer to the flowsheet for vital signs taken during this treatment. After treatment:  
[x] Patient left in no apparent distress sitting up in chair 
[] Patient left in no apparent distress in bed 
[x] Call bell left within reach [x] Nursing notified 
[] Caregiver present [x] Chair alarm activated COMMUNICATION/EDUCATION:  
The patients plan of care was discussed with: Physical Therapist and Registered Nurse. 
[] Home safety education was provided and the patient/caregiver indicated understanding. [] Patient/family have participated as able in goal setting and plan of care. [] Patient/family agree to work toward stated goals and plan of care. [] Patient understands intent and goals of therapy, but is neutral about his/her participation. [x] Patient is unable to participate in goal setting and plan of care. This patients plan of care is appropriate for delegation to [facility]. Thank you for this referral. 
Carissa Arana, OTR/L Time Calculation: 43 mins

## 2018-11-07 NOTE — ROUTINE PROCESS
TRANSFER - OUT REPORT: 
 
Verbal report given to [facility] (name) on Aneudy Edwards  being transferred to [facility]  (unit) for routine progression of care Report consisted of patients Situation, Background, Assessment and  
Recommendations(SBAR). Information from the following report(s) SBAR, ED Summary, [facility] and Recent Results was reviewed with the receiving nurse. Lines:  
Peripheral IV 11/06/18 Right Forearm (Active) Site Assessment Clean, dry, & intact 11/7/2018  3:14 AM  
Phlebitis Assessment 0 11/7/2018  3:14 AM  
Infiltration Assessment 0 11/7/2018  3:14 AM  
Dressing Status Clean, dry, & intact 11/7/2018  3:14 AM  
Dressing Type Transparent 11/7/2018  3:14 AM  
Hub Color/Line Status Yellow 11/7/2018  3:14 AM  
  
 
Opportunity for questions and clarification was provided. Patient transported with: 
 [facility]

## 2018-11-07 NOTE — PROGRESS NOTES
TRANSFER - IN REPORT: 
 
Verbal report received from HUGO Martinez (name) on Stevie Mcclain  being received from ED (unit) for routine progression of care Report consisted of patients Situation, Background, Assessment and  
Recommendations(SBAR). Information from the following report(s) SBAR, Kardex and MAR was reviewed with the receiving nurse. Opportunity for questions and clarification was provided. Assessment completed upon patients arrival to unit and care assumed. Primary Nurse Claudia Castanon and Ana Stein RN performed a dual skin assessment on this patient No impairment noted. Noted a healed wound and scaring on the sacrum.   
Chester score is 18

## 2018-11-07 NOTE — PROGRESS NOTES
Hospitalist Progress Note Meme Dockery MD 
Answering service: [phone] OR [phone] from in house phone Cell: [phone] Date of Service:  2018 NAME:  Taiwo Carrington :  1928 MRN:  [id number] Admission Summary: A 80 y.o lady w/ Parkinson's disease, HTN, CKD, dementia, who presents from her nursing home after a fall. She remembers falling but doesn't recall the events that led to her fall. Per reports, she had tripped and had a ground level fall and hit the right side of her head on a metal part of her bed. She came to the ED 3 days prior for a fall as well and was found to have a UTI, and was prescribed cephalexin. She denies headache, fever, dizziness, n/v, dysuria, abdominal pain. She has no acute complaints currently and says she feels fine. I spoke to her daughter over the phone who reports multiple falls for the past month. Interval history / Subjective:  
Patient poor historian,   
 
Assessment & Plan:  
 
Right lateral occipital scalp laceration secondary to fall 
-s/p stapled in ER Hx of recent UTI 
-diagnosed on 11/3 
-on cephalexin, add probiotics 
-UA no evidence of infection Recurrent fall multifactorial 
-CT head no acute intracranial disease 
-CT cervical spine no fracture. Cervical degenerative disc disease. 
-chest x ray no acute finding -PT/OT Anemia of chronic disease  
-H/H stable 
-monitor cbc HTN 
-BP not at goal 
-on coreg, hydralazine, losartan and monitor BP 
 
CKD stage III 
-continue normal saline 
-creatinine is trending up 
-avoid nephrotoxin 
-if creatinine worse further will hold losartan  
-repeat renal function in am 
 
Hx of Parkinson's disease  
-with multiple fall  
-continue sinemet Dementia  
-continue supportive care Code status: Full Code DVT prophylaxis: SCD Care Plan discussed with: Patient/Family, Nurse and  Disposition: From [address] I called and updated her daughter, Marleni Stewart at [phone], answered her questions Hospital Problems  Date Reviewed: 12/13/2017 Codes Class Noted POA * (Principal) Fall ICD-10-CM: W19. Cony Jenkins ICD-9-CM: J890.6  11/7/2018 Unknown Vital Signs:  
 Last 24hrs VS reviewed since prior progress note. Most recent are: 
Visit Vitals /70 (BP 1 Location: Right arm, BP Patient Position: At rest) Pulse 62 Temp 98.1 °F (36.7 °C) Resp 16 Ht 5' 4\" (1.626 m) SpO2 98% BMI 21.46 kg/m² Intake/Output Summary (Last 24 hours) at 11/7/2018 0750 Last data filed at 11/7/2018 8621 Gross per 24 hour Intake 483 ml Output  Net 483 ml Physical Examination:  
 
 
     
Constitutional:  No acute distress, cooperative, pleasant   
ENT:  Oral mucous moist, oropharynx benign. Neck supple, Resp:  CTA bilaterally. No wheezing/rhonchi/rales. No accessory muscle use CV:  Regular rhythm, normal rate, no murmurs, gallops, rubs GI:  Soft, non distended, non tender. normoactive bowel sounds, no hepatosplenomegaly Musculoskeletal:  No edema, warm Neurologic:  Conscious and alert, answer simple questions, moves all extremities,   
  Skin:  right side occipital scalp laceration s/p stples Data Review:  
 Review and/or order of clinical lab test 
 
 
Labs:  
 
Recent Labs 11/06/18 2237 WBC 5.9 HGB 9.0*  
HCT 29.5*  
 Recent Labs 11/06/18 
2237   
K 4.9 * CO2 22 BUN 40* CREA 1.68* * CA 8.7 MG 2.1 PHOS 3.7 Recent Labs 11/06/18 2237 SGOT 16 ALT 9*  
 TBILI 0.3 TP 7.1 ALB 2.9*  
GLOB 4.2* No results for input(s): INR, PTP, APTT in the last 72 hours. No lab exists for component: INREXT No results for input(s): FE, TIBC, PSAT, FERR in the last 72 hours.   
No results found for: FOL, RBCF  
 No results for input(s): PH, PCO2, PO2 in the last 72 hours. Recent Labs 11/06/18 2237  CKNDX 2.7*  
TROIQ <0.05 Lab Results Component Value Date/Time Cholesterol, total 210 (H) 12/12/2017 12:32 AM  
 HDL Cholesterol 63 12/12/2017 12:32 AM  
 LDL, calculated 133.4 (H) 12/12/2017 12:32 AM  
 Triglyceride 68 12/12/2017 12:32 AM  
 CHOL/HDL Ratio 3.3 12/12/2017 12:32 AM  
 
Lab Results Component Value Date/Time Glucose (POC) 87 03/09/2014 05:14 PM  
 Glucose (POC) 65 03/04/2014 08:41 AM  
 
Lab Results Component Value Date/Time Color YELLOW/STRAW 11/06/2018 10:37 PM  
 Appearance CLOUDY (A) 11/06/2018 10:37 PM  
 Specific gravity 1.026 11/06/2018 10:37 PM  
 pH (UA) 5.0 11/06/2018 10:37 PM  
 Protein 300 (A) 11/06/2018 10:37 PM  
 Glucose NEGATIVE  11/06/2018 10:37 PM  
 Ketone TRACE (A) 11/06/2018 10:37 PM  
 Bilirubin NEGATIVE  11/06/2018 10:37 PM  
 Urobilinogen 0.2 11/06/2018 10:37 PM  
 Nitrites NEGATIVE  11/06/2018 10:37 PM  
 Leukocyte Esterase NEGATIVE  11/06/2018 10:37 PM  
 Epithelial cells MODERATE (A) 11/06/2018 10:37 PM  
 Bacteria NEGATIVE  11/06/2018 10:37 PM  
 WBC 0-4 11/06/2018 10:37 PM  
 RBC 0-5 11/06/2018 10:37 PM  
 
 
 
Medications Reviewed:  
 
Current Facility-Administered Medications Medication Dose Route Frequency  carbidopa-levodopa (SINEMET)  mg per tablet 2 Tab  2 Tab Oral QID  carvedilol (COREG) tablet 3.125 mg  3.125 mg Oral BID WITH MEALS  docusate (COLACE) 50 mg/5 mL oral liquid 50 mg  50 mg Oral DAILY  hydrALAZINE (APRESOLINE) tablet 25 mg  25 mg Oral BID  losartan (COZAAR) tablet 12.5 mg  12.5 mg Oral DAILY  lovastatin (MEVACOR) tablet 20 mg  20 mg Oral DAILY  sodium chloride (NS) flush 5-10 mL  5-10 mL IntraVENous Q8H  
 sodium chloride (NS) flush 5-10 mL  5-10 mL IntraVENous PRN  
 acetaminophen (TYLENOL) tablet 650 mg  650 mg Oral Q4H PRN  
 ondansetron (ZOFRAN) injection 4 mg  4 mg IntraVENous Q4H PRN  
  cephALEXin (KEFLEX) capsule 500 mg  500 mg Oral TID  
 0.9% sodium chloride infusion  125 mL/hr IntraVENous CONTINUOUS  
 
______________________________________________________________________ EXPECTED LENGTH OF STAY: - - - 
ACTUAL LENGTH OF STAY:          0 Krystin Freitas MD

## 2018-11-07 NOTE — ED TRIAGE NOTES
Pt arrives via ems from the [address]. EMS reports pt has dementia A&Ox2 and is baseline. 1-2in lac rightside of head. Pt was walking, fell and hit head on metal frame of bed. VSS per ems

## 2018-11-07 NOTE — PROGRESS NOTES
Bedside and Verbal shift change report given to [address] [place] [address] (oncoming nurse) by Vipul Jain (offgoing nurse). Report included the following information SBAR, Kardex, Intake/Output, MAR, Recent Results and Med Rec Status. 0800: patient's IV flushed; patient reports stinging at site and slight swelling noted 4495: diane Galindo MD; provided brief SBAR and current patient condition;  
MD stated to try to start another IV 
 
0807: PICC team paged; stated they were unable to attempt an IV at this time due to PICC lines needing to be placed 0809: CC transport contacted; no one available to start an IV  
 
0810: CCU paged; no one available [phone]: diane Galindo MD; notified of unable to obtain assistance of having IV placed; suggested to use patient's un-accessed chest port; MD gave verbal order to access port

## 2018-11-07 NOTE — PROGRESS NOTES
Day #1 of Keflex Indication:  UTI Current regimen:  500mg Q8h Recent Labs 18 
2237 WBC 5.9 CREA 1.68* BUN 40* Est CrCl: ~20 ml/min Temp (24hrs), Av °F (36.7 °C), Min:97.6 °F (36.4 °C), Max:98.4 °F (36.9 °C) Cultures:  
11/3 urine - E.coli, Strep Bovis, both S to cefazolin, final 
 urine - in process Plan: Change to 250mg Q8h for CrCl 15-29 ml/min.

## 2018-11-07 NOTE — ED NOTES
10:00 PM 
Change of shift. Care of patient taken over from Verona Vargas DO; H&P reviewed, bedside handoff complete. Awaiting lab results. CONSULT NOTE: 
1:59 AM Lorena Reis MD spoke with Dr. Luis Calzada, Consult for Hospitalist.  Discussed available diagnostic tests and clinical findings.   Dr. Luis Calzada will evaluate the patient for admission to the hospital.

## 2018-11-07 NOTE — H&P
HISTORY AND PHYSICAL 
 
 
PCP: Karlis Duverney, MD 
History source: EMR, ED staff, the patient (limited historian) CC: fall HPI: 80 y.o lady w/ Parkinson's disease, HTN, CKD, dementia, who presents from her nursing home after a fall. She remembers falling but doesn't recall the events that led to her fall. Per reports, she had tripped and had a ground level fall and hit the right side of her head on a metal part of her bed. She came to the ED 3 days prior for a fall as well and was found to have a UTI, and was prescribed cephalexin. She denies headache, fever, dizziness, n/v, dysuria, abdominal pain. She has no acute complaints currently and says she feels fine. I spoke to her daughter over the phone who reports multiple falls for the past month. PMH/PSH: 
Past Medical History:  
Diagnosis Date  Arthritis  Chronic kidney disease RENAL MASS  Environmental allergies  HTN (hypertension) 1/19/2012  Hyperlipemia 1/19/2012  Hypertension  Other ill-defined conditions(799.89)   
 parkinson disease  Parkinson disease ([facility] [address] 1/19/2012  S/P radioactive iodine thyroid ablation 1/19/2012  S/P NINI-BSO 1/19/2012  S/P TKR (total knee replacement) 1/19/2012  Thyroid disease   
 goiter/hyperthyroid (s/p iodine tx 1999)  Unspecified adverse effect of anesthesia CLAUSTROPHOBIA Past Surgical History:  
Procedure Laterality Date  ENDOSCOPY, COLON, DIAGNOSTIC  7/2011  
 ([facility])  HX GI    
 COLONOSCOPY  
 HX ORTHOPAEDIC    
 r hip, l knee replacement 2008 [address] [place] [address] meds:  
Prior to Admission medications Medication Sig Start Date End Date Taking? Authorizing Provider  
cephALEXin (KEFLEX) 500 mg capsule Take 1 Cap by mouth four (4) times daily for 7 days. 11/3/18 11/10/18  Brian Adkins DO  
carvedilol (COREG) 3.125 mg tablet Take 1 Tab by mouth two (2) times daily (with meals).  12/13/17   El Martel NP  
 losartan (COZAAR) 25 mg tablet Take 0.5 Tabs by mouth daily. 12/14/17   David Kohli NP  
ondansetron hcl (ZOFRAN) 4 mg tablet Take 4 mg by mouth every eight (8) hours as needed for Nausea. Provider, Historical  
hydrALAZINE (APRESOLINE) 25 mg tablet TAKE 1 TABLET BY MOUTH TWICE DAILY 8/5/16   Scarlet St MD  
OTHER Rollator Eleuterio [place] with seat 
icd 10 - G20 5/25/16   Scarlet St MD  
lovastatin (MEVACOR) 20 mg tablet TAKE 1 TABLET BY MOUTH EVERY DAY 5/24/16   Scarlet St MD  
docusate sodium (COLACE) 50 mg capsule Take 50 mg by mouth daily. Provider, Historical  
acetaminophen (TYLENOL) 500 mg tablet Take 500 mg by mouth every six (6) hours as needed for Pain. Provider, Historical  
carbidopa-levodopa (SINEMET)  mg per tablet Take 2 Tabs by mouth four (4) times daily. Provider, Historical  
multivitamin with iron tablet Take 1 Tab by mouth daily. [facility]    Provider, Historical  
 
 
Allergies: Allergies Allergen Reactions  Bactroban [Mupirocin Calcium] Other (comments) Skin peels  Betadine [Povidone-Iodine] Rash  Comtan [Entacapone] Diarrhea  Erythromycin Other (comments) GI upset  Penicillins Itching  Sulfa (Sulfonamide Antibiotics) Itching FH: 
Family History Problem Relation Age of Onset  Coronary Artery Disease Father   
     s/p MI  
 Heart Disease Father  Elevated Lipids Father  Cancer Brother   
     hodgkins  Anesth Problems Neg Hx SH: Social History Tobacco Use  Smoking status: Never Smoker  Smokeless tobacco: Never Used Substance Use Topics  Alcohol use: No  
  Alcohol/week: 0.0 oz  
 
 
ROS: A comprehensive review of systems was negative except for that written in the HPI. PHYSICAL EXAM: 
Visit Vitals /52 (BP 1 Location: Right arm, BP Patient Position: At rest) Pulse 65 Temp 98.2 °F (36.8 °C) Resp 18 Ht 5' 4\" (1.626 m) SpO2 99% BMI 21.46 kg/m² Gen: NAD, non-toxic HEENT: anicteric sclerae, normal conjunctiva, head laceration stapled and wrapped Neck: supple, trachea midline, no adenopathy Heart: RRR, no MRG, no JVD, no peripheral edema Lungs: CTA b/l, non-labored respirations Abd: soft, NT, ND, BS+ Extr: warm Skin: dry Neuro: CN II-XII grossly intact, normal speech aside from a stutter, moves all extremities Psych: normal mood, appropriate affect Labs/Imaging: 
Recent Results (from the past 24 hour(s)) EKG, 12 LEAD, INITIAL Collection Time: 11/06/18 10:35 PM  
Result Value Ref Range Ventricular Rate 64 BPM  
 Atrial Rate 64 BPM  
 P-R Interval 174 ms QRS Duration 80 ms  
 Q-T Interval 416 ms  
 QTC Calculation (Bezet) 429 ms Calculated R Axis -18 degrees Calculated T Axis 47 degrees Diagnosis Normal sinus rhythm Nonspecific ST and T wave abnormality When compared with ECG of 11-DEC-2017 17:47, 
Nonspecific T wave abnormality now evident in Inferior leads Nonspecific T wave abnormality, worse in Anterolateral leads CBC WITH AUTOMATED DIFF Collection Time: 11/06/18 10:37 PM  
Result Value Ref Range WBC 5.9 3.6 - 11.0 K/uL  
 RBC 2.89 (L) 3.80 - 5.20 M/uL HGB 9.0 (L) 11.5 - 16.0 g/dL HCT 29.5 (L) 35.0 - 47.0 % .1 (H) 80.0 - 99.0 FL  
 MCH 31.1 26.0 - 34.0 PG  
 MCHC 30.5 30.0 - 36.5 g/dL  
 RDW 16.5 (H) 11.5 - 14.5 % PLATELET 260 124 - 044 K/uL MPV 10.7 8.9 - 12.9 FL  
 NRBC 0.0 0  WBC ABSOLUTE NRBC 0.00 0.00 - 0.01 K/uL NEUTROPHILS 65 32 - 75 % LYMPHOCYTES 22 12 - 49 % MONOCYTES 9 5 - 13 % EOSINOPHILS 4 0 - 7 % BASOPHILS 0 0 - 1 % IMMATURE GRANULOCYTES 0 0.0 - 0.5 % ABS. NEUTROPHILS 3.8 1.8 - 8.0 K/UL  
 ABS. LYMPHOCYTES 1.3 0.8 - 3.5 K/UL  
 ABS. MONOCYTES 0.5 0.0 - 1.0 K/UL  
 ABS. EOSINOPHILS 0.2 0.0 - 0.4 K/UL  
 ABS. BASOPHILS 0.0 0.0 - 0.1 K/UL  
 ABS. IMM. GRANS. 0.0 0.00 - 0.04 K/UL  
 DF AUTOMATED METABOLIC PANEL, COMPREHENSIVE  
 Collection Time: 11/06/18 10:37 PM  
Result Value Ref Range Sodium 142 136 - 145 mmol/L Potassium 4.9 3.5 - 5.1 mmol/L Chloride 112 (H) 97 - 108 mmol/L  
 CO2 22 21 - 32 mmol/L Anion gap 8 5 - 15 mmol/L Glucose 103 (H) 65 - 100 mg/dL BUN 40 (H) 6 - 20 MG/DL Creatinine 1.68 (H) 0.55 - 1.02 MG/DL  
 BUN/Creatinine ratio 24 (H) 12 - 20 GFR est AA 35 (L) >60 ml/min/1.73m2 GFR est non-AA 29 (L) >60 ml/min/1.73m2 Calcium 8.7 8.5 - 10.1 MG/DL Bilirubin, total 0.3 0.2 - 1.0 MG/DL  
 ALT (SGPT) 9 (L) 12 - 78 U/L  
 AST (SGOT) 16 15 - 37 U/L Alk. phosphatase 112 45 - 117 U/L Protein, total 7.1 6.4 - 8.2 g/dL Albumin 2.9 (L) 3.5 - 5.0 g/dL Globulin 4.2 (H) 2.0 - 4.0 g/dL A-G Ratio 0.7 (L) 1.1 - 2.2 SAMPLES BEING HELD Collection Time: 11/06/18 10:37 PM  
Result Value Ref Range SAMPLES BEING HELD red,cassy COMMENT Add-on orders for these samples will be processed based on acceptable specimen integrity and analyte stability, which may vary by analyte. URINALYSIS W/MICROSCOPIC Collection Time: 11/06/18 10:37 PM  
Result Value Ref Range Color YELLOW/STRAW Appearance CLOUDY (A) CLEAR Specific gravity 1.026 1.003 - 1.030    
 pH (UA) 5.0 5.0 - 8.0 Protein 300 (A) NEG mg/dL Glucose NEGATIVE  NEG mg/dL Ketone TRACE (A) NEG mg/dL Bilirubin NEGATIVE  NEG Blood NEGATIVE  NEG Urobilinogen 0.2 0.2 - 1.0 EU/dL Nitrites NEGATIVE  NEG Leukocyte Esterase NEGATIVE  NEG    
 WBC 0-4 0 - 4 /hpf  
 RBC 0-5 0 - 5 /hpf Epithelial cells MODERATE (A) FEW /lpf Bacteria NEGATIVE  NEG /hpf Amorphous Crystals 2+ (A) NEG  
 Granular cast 0-2 (A) NEG /lpf Other: Renal Epithelial cells Present URINE CULTURE HOLD SAMPLE Collection Time: 11/06/18 10:37 PM  
Result Value Ref Range Urine culture hold URINE ON HOLD IN MICROBIOLOGY DEPT FOR 3 DAYS.  IF UNPRESERVED URINE IS SUBMITTED, IT CANNOT BE USED FOR ADDITIONAL TESTING AFTER 24 HRS, RECOLLECTION WILL BE REQUIRED. MAGNESIUM Collection Time: 11/06/18 10:37 PM  
Result Value Ref Range Magnesium 2.1 1.6 - 2.4 mg/dL PHOSPHORUS Collection Time: 11/06/18 10:37 PM  
Result Value Ref Range Phosphorus 3.7 2.6 - 4.7 MG/DL  
CK W/ CKMB & INDEX Collection Time: 11/06/18 10:37 PM  
Result Value Ref Range  26 - 192 U/L  
 CK - MB 2.7 <3.6 NG/ML  
 CK-MB Index 2.7 (H) 0 - 2.5    
TROPONIN I Collection Time: 11/06/18 10:37 PM  
Result Value Ref Range Troponin-I, Qt. <0.05 <0.05 ng/mL Recent Labs 11/06/18 2237 WBC 5.9 HGB 9.0*  
HCT 29.5*  
 Recent Labs 11/06/18 2237   
K 4.9 * CO2 22 BUN 40* CREA 1.68* * CA 8.7 MG 2.1 PHOS 3.7 Recent Labs 11/06/18 2237 SGOT 16 ALT 9*  
 TBILI 0.3 TP 7.1 ALB 2.9*  
GLOB 4.2* Recent Labs 11/06/18 2237  CKNDX 2.7*  
TROIQ <0.05 No results for input(s): INR, PTP, APTT in the last 72 hours. No lab exists for component: INREXT No results for input(s): PH, PCO2, PO2 in the last 72 hours. Ct Head Wo Cont Result Date: 11/6/2018 IMPRESSION: No acute intracranial disease. Ct Spine Cerv Wo Cont Result Date: 11/6/2018 IMPRESSION: No fracture. Cervical degenerative disc disease. Xr Chest [facility] Result Date: 11/6/2018 IMPRESSION: No acute findings. Assessment & Plan:  
 
Head laceration: s/p stapling in the ED 
 
UTI: was diagnosed 11/3 and treated with cephalexin. Cultures grew e coli and strep bovis (contaminant?). Repeat UA today without pyuria. 
-continue PO cephalexin Recurrent falls: this is likely due to her Parkinson's disease and may be exacerbated by the UTI 
-PT and OT evals. Unclear what level of care she is at in the nursing home HTN: 
-continue home meds CKD stage 3: stable Parkinson's disease I updated her daughter Jenny Calvert over the phone. She reports the pt having multiple falls over the past month. She's not sure what level of care her mother is receiving at the nursing home, but says she gets PT regularly. DVT ppx: SCDs Code status: full Disposition: likely needs SNF Signed By: Kelsea Mora MD   
 November 7, 2018

## 2018-11-07 NOTE — ED PROVIDER NOTES
80 y.o. female with past medical history significant for Parkinson's disease, hypertension, arthritis, thyroid disease, hyperlipidemia, and CKD who presents via EMS from home  with chief complaint of head injury. Patient reports that she fell and hit the right side of her head on the metal part of her bed. She notes bleeding from the fall and currently complains of a headache. Of note, patient fell 3 days ago, was evaluated in the hospital, and was diagnosed with a UTI. Pertinent negatives include abdominal pain. There are no other acute medical concerns at this time. Full history, physical exam, and ROS unable to be obtained due to:  dementia. Social hx: Denies tobacco use; denies alcohol use; denies illicit drug use PCP: Sharon George MD 
 
Note written by Marilu Murray, as dictated by Damian Vasquez DO 9:15 PM 
 
 
 
 
  
 
Past Medical History:  
Diagnosis Date  Arthritis  Chronic kidney disease RENAL MASS  Environmental allergies  HTN (hypertension) 1/19/2012  Hyperlipemia 1/19/2012  Hypertension  Other ill-defined conditions(799.89)   
 parkinson disease  Parkinson disease ([facility] [address] 1/19/2012  S/P radioactive iodine thyroid ablation 1/19/2012  S/P NINI-BSO 1/19/2012  S/P TKR (total knee replacement) 1/19/2012  Thyroid disease   
 goiter/hyperthyroid (s/p iodine tx 1999)  Unspecified adverse effect of anesthesia CLAUSTROPHOBIA Past Surgical History:  
Procedure Laterality Date  ENDOSCOPY, COLON, DIAGNOSTIC  7/2011  
 ([facility])  HX GI    
 COLONOSCOPY  
 HX ORTHOPAEDIC    
 r hip, l knee replacement 2008 [address] Family History:  
Problem Relation Age of Onset  Coronary Artery Disease Father   
     s/p MI  
 Heart Disease Father  Elevated Lipids Father  Cancer Brother   
     hodgkins  Anesth Problems Neg Hx Social History Socioeconomic History  Marital status:  Spouse name: Not on file  Number of children: Not on file  Years of education: Not on file  Highest education level: Not on file Social Needs  Financial resource strain: Not on file  Food insecurity - worry: Not on file  Food insecurity - inability: Not on file  Transportation needs - medical: Not on file  Transportation needs - non-medical: Not on file Occupational History  Not on file Tobacco Use  Smoking status: Never Smoker  Smokeless tobacco: Never Used Substance and Sexual Activity  Alcohol use: No  
  Alcohol/week: 0.0 oz  Drug use: No  
 Sexual activity: No  
Other Topics Concern  Not on file Social History Narrative  Not on file ALLERGIES: Bactroban [mupirocin calcium]; Betadine [povidone-iodine]; Comtan [entacapone]; Erythromycin; Penicillins; and Sulfa (sulfonamide antibiotics) Review of Systems Gastrointestinal: Negative for abdominal pain. Skin: Positive for wound. Neurological: Positive for headaches. All other systems reviewed and are negative. Vitals:  
 11/06/18 2117 BP: 147/64 Pulse: 63 Resp: 18 Temp: 97.6 °F (36.4 °C) SpO2: 100% Height: 5' 4\" (1.626 m) Physical Exam  
Constitutional:  
Frail, elderly HENT:  
Head: Normocephalic. Eyes: Conjunctivae and EOM are normal. Pupils are equal, round, and reactive to light. Neck: No tracheal deviation present. Cardiovascular: Normal rate, regular rhythm and intact distal pulses. Pulmonary/Chest: Effort normal and breath sounds normal.  
Abdominal: Soft. She exhibits no distension and no mass. There is no tenderness. There is no guarding. Musculoskeletal: Normal range of motion. She exhibits no edema. Neurological: She is alert. No sensory deficit. She exhibits normal muscle tone. Skin: Skin is warm and dry. MDM Number of Diagnoses or Management Options Closed head injury, initial encounter:  
Fall, initial encounter: Laceration of scalp, initial encounter:  
Critical Care Total time providing critical care: 30-74 minutes 30 minutes Wound Repair 
Date/Time: 11/6/2018 9:40 PM 
Performed by: attendingProcedure prep: cleaned with 4x4s and tap water. Location details: scalp Wound length:2.5 cm or less Irrigation solution: tap water Debridement: none Skin closure: staples Number of sutures: 4 Technique: interrupted Approximation: close Patient tolerance: Patient tolerated the procedure well with no immediate complications My total time at bedside, performing this procedure was 1-15 minutes. Comments: Had to rapidly get control of bleeding with staples and direct pressure. Coban dressing applied after stapling finished. On arrival, I evaluated pt. She is alert. Her R side of the head is soaked with blood. Her small 1 cm occipital wound was spurting blood. I placed several staples and there was less bleeding. Placed a pressure dressing on the head and she went off to CT. Reviewed CT images Blood/urine testing is pending Plan is admission Signed out to Dr Jag Marshall with blood and urine testing pending.

## 2018-11-07 NOTE — PROGRESS NOTES
1430: received call from patient's daughter; stated she had talked to Dr. Baron Mckeon and asked if the patient would be seeing the neurologist. Discussed with daughter that message would be passed on to the hospitalist; daughter verbalized understanding

## 2018-11-07 NOTE — PROGRESS NOTES
Problem: Pressure Injury - Risk of 
Goal: *Prevention of pressure injury Document Chester Scale and appropriate interventions in the flowsheet. Outcome: Progressing Towards Goal 
Pressure Injury Interventions: 
Sensory Interventions: Keep linens dry and wrinkle-free, Assess changes in LOC Moisture Interventions: Absorbent underpads, Apply protective barrier, creams and emollients Activity Interventions: Increase time out of bed Mobility Interventions: Pressure redistribution bed/mattress (bed type), Float heels Nutrition Interventions: Document food/fluid/supplement intake Friction and Shear Interventions: Lift sheet Problem: Falls - Risk of 
Goal: *Absence of Falls Outcome: Progressing Towards Goal 
Fall Risk Interventions: 
Mobility Interventions: Bed/chair exit alarm, Patient to call before getting OOB Mentation Interventions: Bed/chair exit alarm, Room close to nurse's station Elimination Interventions: Call light in reach, Bed/chair exit alarm History of Falls Interventions: Bed/chair exit alarm, Door open when patient unattended, Room close to nurse's station

## 2018-11-07 NOTE — PROGRESS NOTES
Problem: Mobility Impaired (Adult and Pediatric) Goal: *Acute Goals and Plan of Care (Insert Text) Physical Therapy Goals Initiated 11/7/2018 1. Patient will move from supine to sit and sit to supine  in bed with minimal assistance/contact guard assist within 7 day(s). 2.  Patient will transfer from bed to chair and chair to bed with moderate assistance x 1 using the least restrictive device within 7 day(s). 3.  Patient will perform sit to stand with moderate assistance x 1 within 7 day(s). 4.  Patient will ambulate with moderate assistance x 1 for at least 10 feet with the least restrictive device within 7 day(s). physical Therapy EVALUATION Patient: Shady Amezcua (48 y.o. female) Date: 11/7/2018 Primary Diagnosis: Fall Precautions: fall, contact iso ASSESSMENT : 
Based on the objective data described below, the patient presents with decreased function with mobility due to LE weakness R >L, general debility, impaired balance. Tolerated rolling multiple times for change of brief after bowel incontinence and transfer to chair with RW and 2 person assist. Noted difficulty with L LE management during transfer, stooped posture. Pt demo post lean while sitting unsupported, required assist for balance when attempting dynamic activities. Plan to progress mobility training as tolerated. Pt may benefit from SNF level PT at d/c. Patient will benefit from skilled intervention to address the above impairments. Patients rehabilitation potential is considered to be Good Factors which may influence rehabilitation potential include:  
[x]         None noted 
[]         Mental ability/status []         Medical condition 
[]         Home/family situation and support systems 
[]         Safety awareness 
[]         Pain tolerance/management 
[]         Other: PLAN : 
Recommendations and Planned Interventions: 
[x]           Bed Mobility Training             []    Neuromuscular Re-Education [x]           Transfer Training                   []    Orthotic/Prosthetic Training 
[x]           Gait Training                         []    Modalities [x]           Therapeutic Exercises           []    Edema Management/Control 
[x]           Therapeutic Activities            [x]    Patient and Family Training/Education 
[]           Other (comment): Frequency/Duration: Patient will be followed by physical therapy  4 times a week to address goals. Discharge Recommendations: Kane Blackmon Further Equipment Recommendations for Discharge: to be determined SUBJECTIVE:  
Patient stated: Pt asking about wrap on head OBJECTIVE DATA SUMMARY:  
HISTORY:   
Past Medical History:  
Diagnosis Date  Arthritis  Chronic kidney disease RENAL MASS  Environmental allergies  HTN (hypertension) 1/19/2012  Hyperlipemia 1/19/2012  Hypertension  Other ill-defined conditions(799.89)   
 parkinson disease  Parkinson disease ([facility] [address] 1/19/2012  S/P radioactive iodine thyroid ablation 1/19/2012  S/P NINI-BSO 1/19/2012  S/P TKR (total knee replacement) 1/19/2012  Thyroid disease   
 goiter/hyperthyroid (s/p iodine tx 1999)  Unspecified adverse effect of anesthesia CLAUSTROPHOBIA Past Surgical History:  
Procedure Laterality Date  ENDOSCOPY, COLON, DIAGNOSTIC  7/2011  
 ([facility])  HX GI    
 COLONOSCOPY  
 HX ORTHOPAEDIC    
 r hip, l knee replacement 2008 [facility] [facility] Prior Level of Function/Home Situation: Pt is resident of LTC unit ([facility]) Personal factors and/or comorbidities impacting plan of care: Pt not fully oriented, but followed commands consistently Home Situation Home Environment: Long term care EXAMINATION/PRESENTATION/DECISION MAKING: Critical Behavior: 
Neurologic State: Alert Orientation Level: Oriented to person Cognition: Follows commands Hearing: Auditory Auditory Impairment: NoneSkin:   
Edema: Range Of Motion: 
AROM: Grossly decreased, non-functional(decreased knee ext R >L) Strength:   
Strength: Grossly decreased, non-functional(L knee ext grossly 4/5, R knee ext 3-/5) Tone & Sensation:  
Tone: Normal 
  
  
  
  
  
  
  
  
   
Coordination: 
Coordination: Grossly decreased, non-functional 
Vision:  
  
Functional Mobility: 
Bed Mobility: 
Rolling: Moderate assistance(assist at LEs/ pelvis, trunk) Supine to Sit: Moderate assistance(R side to sit, assist for LEs and initiation of lift at trun) Transfers: 
  
  
Stand Pivot Transfers: Moderate assistance Bed to Chair: Assist x2(bed to chair SPT to R, A for balance, walker mgmt, L LE) Balance:  
 Ambulation/Gait Training:  
  
  
  
  
  
  
  
  
  
  
  
  
  
  
  
   
  
  
   
 
 
 
 
Functional Measure: 
Functional Reach: 
 
Completed: 
[] standing       [x] seated Average: 0 Functional Reach Test and G-code impairment scale: For inches: Measure in cm and divide by 2.54 Percentage of Impairment CH 
 
0% 
 CI 
 
1-19% CJ 
 
20-39% CK 
 
40-59% CL 
 
60-79% CM 
 
80-99% CN  
 
100% Functional Reach Score 15-25 cm 25 24 22-23 20-21 18-19 16-17 < 15 Functional Reach Score 6-10 in 10      < 6 Functional reach: (standing) Reaches £  6 in = High Fall Risk Reaches 6-10 in = Moderate Fall Risk Reaches ³  10 in = Low Fall Risk mark Simms al. Functional reach: a new clinical measure of balance. J [facility] Diana Hanleyte; 39: [id number]. Modified Functional Reach: (seated) Age: Men: Women:  
18-38 17.9\" 17.6\" 40-59 17.5\" 15.9\" 60-79 14.4\" 13.2\" 80-97 14.0\" 12.5\" Eduin Duckworth. Forward and Lateral Sitting Functional Reach in Younger, Middle-aged, and Older Adults. J Geriatric Phys Ther. 2007; 30:43-48 G codes: In compliance with CMSs Claims Based Outcome Reporting, the following G-code set was chosen for this patient based on their primary functional limitation being treated: The outcome measure chosen to determine the severity of the functional limitation was the Modified Functional Reach with a score of 0/25 which was correlated with the impairment scale. ? Mobility - Walking and Moving Around:  
  - CURRENT STATUS: CN - 100% impaired, limited or restricted  - GOAL STATUS: CM - 80%-99% impaired, limited or restricted  - D/C STATUS:  ---------------To be determined--------------- Physical Therapy Evaluation Charge Determination History Examination Presentation Decision-Making MEDIUM  Complexity : 1-2 comorbidities / personal factors will impact the outcome/ POC  MEDIUM Complexity : 3 Standardized tests and measures addressing body structure, function, activity limitation and / or participation in recreation  LOW Complexity : Stable, uncomplicated  LOW Complexity : FOTO score of  Based on the above components, the patient evaluation is determined to be of the following complexity level: LOW Pain: 
Pain Scale 1: Numeric (0 - 10) Pain Intensity 1: 0 Pain Location 1: Head 
Pain Orientation 1: Anterior Pain Description 1: Aching Pain Intervention(s) 1: Medication (see MAR) Activity Tolerance:  
Pt tolerated bed mob, EOB activity, transfer to chair with assist. 
Please refer to the flowsheet for vital signs taken during this treatment. After treatment:  
[x]         Patient left in no apparent distress sitting up in chair ; chair alarm activated 
[]         Patient left in no apparent distress in bed 
[x]         Call bell left within reach [x]         Nursing notified 
[]         Caregiver present 
[]         Bed alarm activated COMMUNICATION/EDUCATION:  
The patients plan of care was discussed with: Registered Nurse. [x]         Fall prevention education was provided and the patient/caregiver indicated understanding. [x]         Patient/family have participated as able in goal setting and plan of care. []         Patient/family agree to work toward stated goals and plan of care. []         Patient understands intent and goals of therapy, but is neutral about his/her participation. []         Patient is unable to participate in goal setting and plan of care. Thank you for this referral. 
Shiraz Villarreal, PT Time Calculation: 41 mins

## 2018-11-08 VITALS
DIASTOLIC BLOOD PRESSURE: 69 MMHG | OXYGEN SATURATION: 98 % | RESPIRATION RATE: 16 BRPM | HEIGHT: 64 IN | TEMPERATURE: 98.4 F | SYSTOLIC BLOOD PRESSURE: 164 MMHG | BODY MASS INDEX: 21.46 KG/M2 | HEART RATE: 60 BPM

## 2018-11-08 LAB
ANION GAP SERPL CALC-SCNC: 5 MMOL/L (ref 5–15)
BACTERIA SPEC CULT: NORMAL
BUN SERPL-MCNC: 36 MG/DL (ref 6–20)
BUN/CREAT SERPL: 25 (ref 12–20)
CALCIUM SERPL-MCNC: 7.7 MG/DL (ref 8.5–10.1)
CC UR VC: NORMAL
CHLORIDE SERPL-SCNC: 118 MMOL/L (ref 97–108)
CO2 SERPL-SCNC: 21 MMOL/L (ref 21–32)
CREAT SERPL-MCNC: 1.42 MG/DL (ref 0.55–1.02)
GLUCOSE SERPL-MCNC: 74 MG/DL (ref 65–100)
POTASSIUM SERPL-SCNC: 5.1 MMOL/L (ref 3.5–5.1)
SERVICE CMNT-IMP: NORMAL
SERVICE CMNT-IMP: NORMAL
SODIUM SERPL-SCNC: 144 MMOL/L (ref 136–145)

## 2018-11-08 PROCEDURE — 96361 HYDRATE IV INFUSION ADD-ON: CPT

## 2018-11-08 PROCEDURE — 97530 THERAPEUTIC ACTIVITIES: CPT

## 2018-11-08 PROCEDURE — 74011250636 HC RX REV CODE- 250/636: Performed by: HOSPITALIST

## 2018-11-08 PROCEDURE — 80048 BASIC METABOLIC PNL TOTAL CA: CPT

## 2018-11-08 PROCEDURE — 36415 COLL VENOUS BLD VENIPUNCTURE: CPT

## 2018-11-08 PROCEDURE — 74011250636 HC RX REV CODE- 250/636: Performed by: EMERGENCY MEDICINE

## 2018-11-08 PROCEDURE — 74011250637 HC RX REV CODE- 250/637: Performed by: HOSPITALIST

## 2018-11-08 PROCEDURE — 99218 HC RM OBSERVATION: CPT

## 2018-11-08 PROCEDURE — 36591 DRAW BLOOD OFF VENOUS DEVICE: CPT

## 2018-11-08 RX ORDER — HEPARIN 100 UNIT/ML
300 SYRINGE INTRAVENOUS AS NEEDED
Status: DISCONTINUED | OUTPATIENT
Start: 2018-11-08 | End: 2018-11-08

## 2018-11-08 RX ORDER — HEPARIN 100 UNIT/ML
300 SYRINGE INTRAVENOUS
Status: COMPLETED | OUTPATIENT
Start: 2018-11-08 | End: 2018-11-08

## 2018-11-08 RX ADMIN — CARBIDOPA AND LEVODOPA 2 TABLET: 25; 100 TABLET ORAL at 17:02

## 2018-11-08 RX ADMIN — SODIUM CHLORIDE 125 ML/HR: 900 INJECTION, SOLUTION INTRAVENOUS at 00:52

## 2018-11-08 RX ADMIN — Medication 300 UNITS: at 16:41

## 2018-11-08 RX ADMIN — LOSARTAN POTASSIUM 12.5 MG: 25 TABLET ORAL at 09:38

## 2018-11-08 RX ADMIN — CARVEDILOL 3.12 MG: 3.12 TABLET, FILM COATED ORAL at 16:26

## 2018-11-08 RX ADMIN — CEPHALEXIN 250 MG: 250 CAPSULE ORAL at 16:26

## 2018-11-08 RX ADMIN — CARVEDILOL 3.12 MG: 3.12 TABLET, FILM COATED ORAL at 07:14

## 2018-11-08 RX ADMIN — DOCUSATE SODIUM 50 MG: 50 LIQUID ORAL at 09:37

## 2018-11-08 RX ADMIN — CEPHALEXIN 250 MG: 250 CAPSULE ORAL at 09:37

## 2018-11-08 RX ADMIN — Medication 10 ML: at 09:47

## 2018-11-08 RX ADMIN — HYDRALAZINE HYDROCHLORIDE 25 MG: 25 TABLET, FILM COATED ORAL at 16:31

## 2018-11-08 RX ADMIN — HYDRALAZINE HYDROCHLORIDE 25 MG: 25 TABLET, FILM COATED ORAL at 09:37

## 2018-11-08 RX ADMIN — Medication 1 CAPSULE: at 09:38

## 2018-11-08 RX ADMIN — CARBIDOPA AND LEVODOPA 2 TABLET: 25; 100 TABLET ORAL at 09:45

## 2018-11-08 RX ADMIN — LOVASTATIN 20 MG: 20 TABLET ORAL at 09:37

## 2018-11-08 RX ADMIN — SODIUM CHLORIDE 75 ML/HR: 900 INJECTION, SOLUTION INTRAVENOUS at 08:55

## 2018-11-08 RX ADMIN — CARBIDOPA AND LEVODOPA 2 TABLET: 25; 100 TABLET ORAL at 12:59

## 2018-11-08 NOTE — PROGRESS NOTES
Patient's daughter Arcadio Mack called, reminded that she wants to speak with the attending MD and wants patient to be seen by Neurologist for her Parkinson's disease.

## 2018-11-08 NOTE — PROGRESS NOTES
Patient approved to return to The [address] [place] [address],[address], [facility] today. Daughter aware of transport time. Ambulance transport scheduled for 5:30p today. RADHA Domingo, CRM 
 
5:33p  CM received call from [facility] advising of a 30 min delay in transport. Charge nurse Ijeoma Odom RN) notified.

## 2018-11-08 NOTE — DISCHARGE INSTRUCTIONS
Discharge SNF/Rehab Instructions/LTAC       PATIENT ID: Taiwo Carrington  MRN: [id number]   YOB: 1928    DATE OF ADMISSION: 11/6/2018  9:10 PM    DATE OF DISCHARGE: 11/8/2018    PRIMARY CARE PROVIDER: Sharon George MD       ATTENDING PHYSICIAN: Negar Flores MD  DISCHARGING PROVIDER: Meme Dockery MD     To contact this individual call [phone] and ask the  to page. If unavailable ask to be transferred the Adult Hospitalist Department. CONSULTATIONS: IP CONSULT TO HOSPITALIST    PROCEDURES/SURGERIES: * No surgery found *    ADMITTING DIAGNOSES & HOSPITAL COURSE:     A 80 y.o lady with PMH of Parkinson's disease, HTN, CKD, dementia, who presents from her nursing home after a fall. She remembers falling but doesn't recall the events that led to her fall. Per reports, she had tripped and had a ground level fall and hit the right side of her head on a metal part of her bed. She came to the ED 3 days prior for a fall as well and was found to have a UTI, and was prescribed cephalexin. She denies headache, fever, dizziness, n/v, dysuria, abdominal pain. She has no acute complaints currently and says she feels fine.     Right lateral occipital scalp laceration secondary to fall  -s/p stapled in ER  -continue local wound care  -remove staples after 7 days  Hx of recent UTI  -diagnosed on 11/3  -completed cephalexin, continue probiotics  -UA no evidence of infection  Recurrent fall multifactorial  -CT head no acute intracranial disease  -CT cervical spine no fracture.  Cervical degenerative disc disease.  -chest x ray no acute finding  -fall precaution  -continue PT/OT  Anemia of chronic disease   -H/H stable  -monitor cbc  HTN  -BP not at goal  -on coreg, hydralazine, losartan and monitor BP   CKD stage III  -on normal saline  -creatinine is improving  -avoid nephrotoxin  Hx of Parkinson's disease   -with multiple fall   -continue sinemet   Dementia   -continue supportive care     Code status: Full Code  DVT prophylaxis: SCD    DISCHARGE DIAGNOSES / PLAN:      Right lateral occipital scalp laceration secondary to fall  -s/p stapled in ER  -continue local wound care  -remove staples after 7 days  Hx of recent UTI  -completed abx, continue probiotics  Recurrent fall multifactorial  -fall precaution  -continue PT/OT  Anemia of chronic disease   -H/H stable  -monitor cbc  HTN  -on coreg, hydralazine, losartan and monitor BP   CKD stage III  -avoid nephrotoxin  Hx of Parkinson's disease   -with multiple fall   -continue sinemet   -outpatient follow up with Neurologist   Dementia   -continue supportive care    PENDING TEST RESULTS:   At the time of discharge the following test results are still pending: none    FOLLOW UP APPOINTMENTS:    Follow-up Information     Follow up With Specialties Details Why Contact Info   1. Stiven Mae MD Internal Medicine In one week  [address]  [phone]       2. Follow up with Neurologist in 1-2 weeks    ADDITIONAL CARE RECOMMENDATIONS:     DIET: Cardiac Diet    TUBE FEEDING INSTRUCTIONS: none    OXYGEN / BiPAP SETTINGS: None    ACTIVITY: Activity as tolerated    WOUND CARE: none    EQUIPMENT needed: none      DISCHARGE MEDICATIONS:   See Medication Reconciliation Form      NOTIFY YOUR PHYSICIAN FOR ANY OF THE FOLLOWING:   Fever over 101 degrees for 24 hours. Chest pain, shortness of breath, fever, chills, nausea, vomiting, diarrhea, change in mentation, falling, weakness, bleeding. Severe pain or pain not relieved by medications. Or, any other signs or symptoms that you may have questions about.     DISPOSITION:    Home With:   OT  PT  HH  RN      x SNF/Inpatient Rehab/LTAC    Independent/assisted living    Hospice    Other:       PATIENT CONDITION AT DISCHARGE:     Functional status    Poor    x Deconditioned     Independent      Cognition     Lucid     Forgetful    x Dementia      Catheters/lines (plus indication) Maya     PICC     PEG    x None      Code status    x Full code     DNR      PHYSICAL EXAMINATION AT DISCHARGE:   Refer to Progress Note      CHRONIC MEDICAL DIAGNOSES:  Problem List as of 11/8/2018 Date Reviewed: 12/13/2017          Codes Class Noted - Resolved    * (Principal) Fall ICD-10-CM: W19Taylor Menjivar  ICD-9-CM: Q310.7  11/7/2018 - Present        Generalized weakness ICD-10-CM: R53.1  ICD-9-CM: 780.79  12/11/2017 - Present        Laceration of head ICD-10-CM: S01. 91XA  ICD-9-CM: 873.8  12/11/2017 - Present        Parkinson's disease dementia ([facility] ICD-10-CM: G20, F02.80  ICD-9-CM: 332.0, 294.10  5/25/2016 - Present        Pre-syncope ICD-10-CM: R55  ICD-9-CM: 780.2  8/19/2014 - Present        Renal cell carcinoma (HCC) ICD-10-CM: C64.9  ICD-9-CM: 189.0  7/3/2014 - Present    Overview Signed 7/3/2014  3:30 PM by Ortega Corral MD     S/p Left partial nephrectomy, 3/2014,             History of screening mammography (Chronic) ICD-10-CM: Z92.89  ICD-9-CM: V15.89  3/7/2012 - Present    Overview Addendum 3/12/2013  1:25 PM by Ortega Corral MD     3/12/13, [facility]             Parkinson disease ([facility] ICD-10-CM: Gabriela De Los Santos  ICD-9-CM: 332.0  1/19/2012 - Present    Overview Addendum 7/3/2014  3:31 PM by Ortega Corral MD     Advanced disease, Dr. Von Easton               HTN (hypertension) ICD-10-CM: I10  ICD-9-CM: 401.9  1/19/2012 - Present        Hyperlipemia ICD-10-CM: E78.5  ICD-9-CM: 272.4  1/19/2012 - Present        S/P TKR (total knee replacement) ICD-10-CM: X50.592  ICD-9-CM: V43.65  1/19/2012 - Present    Overview Addendum 1/19/2012  8:16 PM by Ortega Corral MD     bilateral , 2000, Dr. Omi Mars             S/P radioactive iodine thyroid ablation ICD-10-CM: Z92.3  ICD-9-CM: V45.89  1/19/2012 - Present    Overview Signed 1/19/2012  8:15 PM by Ortega Corral MD     1999             S/P NINI-BSO ICD-10-CM: Z90.710, A40.940, Z90.79  ICD-9-CM: V88.01, V45.77  1/19/2012 - Present        Environmental allergies ICD-10-CM: Z91.09  ICD-9-CM: V15.09  1/19/2012 - Present        Anemia ICD-10-CM: D64.9  ICD-9-CM: 285.9  1/19/2012 - Present        RESOLVED: Dehydration ICD-10-CM: E86.0  ICD-9-CM: 276.51  12/11/2017 - 12/13/2017        RESOLVED: Acute hyperkalemia ICD-10-CM: E87.5  ICD-9-CM: 276.7  12/11/2017 - 12/13/2017        RESOLVED: Encephalopathy ICD-10-CM: G93.40  ICD-9-CM: 348.30  6/8/2015 - 5/25/2016        RESOLVED: Dehydration ICD-10-CM: E86.0  ICD-9-CM: 276.51  6/20/2014 - 5/25/2016        RESOLVED:  (acute kidney injury) ([facility] ICD-10-CM: N17.9  ICD-9-CM: 584.9  6/20/2014 - 5/25/2016                CDMP Checked:   Yes x     PROBLEM LIST Updated:  Yes x         Signed:   Renetta Valentino MD  11/8/2018  2:50 PM

## 2018-11-08 NOTE — PROGRESS NOTES
Problem: Mobility Impaired (Adult and Pediatric) Goal: *Acute Goals and Plan of Care (Insert Text) Physical Therapy Goals Initiated 11/7/2018 1. Patient will move from supine to sit and sit to supine  in bed with minimal assistance/contact guard assist within 7 day(s). 2.  Patient will transfer from bed to chair and chair to bed with moderate assistance x 1 using the least restrictive device within 7 day(s). 3.  Patient will perform sit to stand with moderate assistance x 1 within 7 day(s). 4.  Patient will ambulate with moderate assistance x 1 for at least 10 feet with the least restrictive device within 7 day(s). physical Therapy TREATMENT Patient: Meek Luke (48 y.o. female) Date: 11/8/2018 Diagnosis: Fall Fall Precautions:   
Chart, physical therapy assessment, plan of care and goals were reviewed. ASSESSMENT: 
Patient received in  Bed and agreeable to therapy. Moving slightly better today requiring mod assist of one for bed mobility and transfers. Did not significant crepitus in left shoulder as well as decreased ROM but appears to be chronic. Patient may be near her baseline. Expect return to LTC. Progression toward goals: 
[]    Improving appropriately and progressing toward goals [x]    Improving slowly and progressing toward goals 
[]    Not making progress toward goals and plan of care will be adjusted PLAN: 
Patient continues to benefit from skilled intervention to address the above impairments. Continue treatment per established plan of care. Discharge Recommendations:  Back to Prior facility Further Equipment Recommendations for Discharge: none SUBJECTIVE:  
Patient stated I'm okay.  OBJECTIVE DATA SUMMARY:  
Critical Behavior: 
Neurologic State: Alert Orientation Level: Oriented to person, Oriented to place, Disoriented to time, Oriented to situation Cognition: Follows commands Functional Mobility Training: 
Bed Mobility: 
Rolling: Minimum assistance Supine to Sit: Moderate assistance Transfers: 
Sit to Stand: Moderate assistance Stand to Sit: Minimum assistance Bed to Chair: Moderate assistance Balance: 
Sitting: Impaired Sitting - Static: Fair (occasional) Sitting - Dynamic: Fair (occasional) Standing: Impaired; With support Standing - Static: Fair Standing - Dynamic : PoorAmbulation/Gait Training: 
Distance (ft): 3 Feet (ft) Assistive Device: Gait belt;Walker, rolling Ambulation - Level of Assistance: Moderate assistance Gait Abnormalities: Decreased step clearance;Shuffling gait Base of Support: Narrowed Speed/Jada: Delayed; Shuffled;Pace decreased (<100 feet/min) Step Length: Right shortened;Left shortened Therapeutic Exercises: Active assist BLE ROM Pain: 
Pain Scale 1: Numeric (0 - 10) Pain Intensity 1: 0 After treatment:  
[x]    Patient left in no apparent distress sitting up in chair 
[]    Patient left in no apparent distress in bed 
[x]    Call bell left within reach [x]    Nursing notified 
[]    Caregiver present [x]    Bed alarm activated COMMUNICATION/COLLABORATION:  
The patients plan of care was discussed with: Registered Nurse Mallory Flores, PT Time Calculation: 25 mins

## 2018-11-08 NOTE — DISCHARGE SUMMARY
Discharge Summary PATIENT ID: Mi Martinez MRN: [id number] YOB: 1928 DATE OF ADMISSION: 11/6/2018  9:10 PM   
DATE OF DISCHARGE: 11/8/2018 PRIMARY CARE PROVIDER: Stiven Mae MD  
 
ATTENDING PHYSICIAN: Yohan Hairston MD 
DISCHARGING PROVIDER: Darlina Gaucher, MD   
To contact this individual call [phone] and ask the  to page. If unavailable ask to be transferred the Adult Hospitalist Department. CONSULTATIONS: IP CONSULT TO HOSPITALIST 
 
PROCEDURES/SURGERIES: * No surgery found * ADMITTING DIAGNOSES & HOSPITAL COURSE:  
A 80 y.o lady with PMH of Parkinson's disease, HTN, CKD, dementia, who presents from her nursing home after a fall. She remembers falling but doesn't recall the events that led to her fall. Per reports, she had tripped and had a ground level fall and hit the right side of her head on a metal part of her bed. She came to the ED 3 days prior for a fall as well and was found to have a UTI, and was prescribed cephalexin. She denies headache, fever, dizziness, n/v, dysuria, abdominal pain. She has no acute complaints currently and says she feels fine.  
 
Right lateral occipital scalp laceration secondary to fall 
-s/p stapled in ER 
-continue local wound care 
-remove staples after 7 days Hx of recent UTI 
-diagnosed on 11/3 
-completed cephalexin, continue probiotics 
-UA no evidence of infection Recurrent fall multifactorial 
-CT head no acute intracranial disease 
-CT cervical spine no fracture. Cervical degenerative disc disease. 
-chest x ray no acute finding 
-fall precaution 
-continue PT/OT Anemia of chronic disease  
-H/H stable 
-monitor cbc HTN 
-BP not at goal 
-on coreg, hydralazine, losartan and monitor BP  
CKD stage III 
-on normal saline 
-creatinine is improving 
-avoid nephrotoxin Hx of Parkinson's disease  
-with multiple fall  
-continue sinemet  
Dementia  
-continue supportive care 
  
Code status: Full Code DVT prophylaxis: SCD 
 
DISCHARGE DIAGNOSES / PLAN:   
 
Right lateral occipital scalp laceration secondary to fall 
-s/p stapled in ER 
-continue local wound care 
-remove staples after 7 days Hx of recent UTI 
-completed abx, continue probiotics Recurrent fall multifactorial 
-fall precaution 
-continue PT/OT Anemia of chronic disease  
-H/H stable 
-monitor cbc HTN 
-on coreg, hydralazine, losartan and monitor BP  
CKD stage III 
-avoid nephrotoxin Hx of Parkinson's disease  
-with multiple fall  
-continue sinemet  
-outpatient follow up with Neurologist  
Dementia  
-continue supportive care PENDING TEST RESULTS:  
At the time of discharge the following test results are still pending: none FOLLOW UP APPOINTMENTS:   
Follow-up Information Follow up With Specialties Details Why Contact Info 1. Jiles Leyden, MD Internal Medicine In one week  [phone] 
[phone] 
  
 
2. Follow up with Neurologist in 1-2 weeks ADDITIONAL CARE RECOMMENDATIONS:  
 
DIET: Cardiac Diet ACTIVITY: Activity as tolerated WOUND CARE: None EQUIPMENT needed: None DISCHARGE MEDICATIONS: 
Current Discharge Medication List  
  
START taking these medications Details L. acidoph & paracasei- S therm- Bifido (EH-Q/RISAQUAD) 8 billion cell cap cap Take 1 Cap by mouth daily. Qty: 10 Cap, Refills: 0 CONTINUE these medications which have NOT CHANGED Details  
carvedilol (COREG) 3.125 mg tablet Take 1 Tab by mouth two (2) times daily (with meals). Qty: 60 Tab, Refills: 0  
  
losartan (COZAAR) 25 mg tablet Take 0.5 Tabs by mouth daily. Qty: 30 Tab, Refills: 0  
  
ondansetron hcl (ZOFRAN) 4 mg tablet Take 4 mg by mouth every eight (8) hours as needed for Nausea. hydrALAZINE (APRESOLINE) 25 mg tablet TAKE 1 TABLET BY MOUTH TWICE DAILY Qty: 180 Tab, Refills: 0  
  
OTHER Rollator Walker with seat 
icd 10 - G20 Qty: 1 Each, Refills: 0 lovastatin (MEVACOR) 20 mg tablet TAKE 1 TABLET BY MOUTH EVERY DAY Qty: 90 Tab, Refills: 1  
  
docusate sodium (COLACE) 50 mg capsule Take 50 mg by mouth daily. acetaminophen (TYLENOL) 500 mg tablet Take 500 mg by mouth every six (6) hours as needed for Pain. carbidopa-levodopa (SINEMET)  mg per tablet Take 2 Tabs by mouth four (4) times daily. multivitamin with iron tablet Take 1 Tab by mouth daily. [place] [facility] STOP taking these medications  
  
 cephALEXin (KEFLEX) 500 mg capsule Comments:  
Reason for Stopping:   
   
  
 
 
 
NOTIFY YOUR PHYSICIAN FOR ANY OF THE FOLLOWING:  
Fever over 101 degrees for 24 hours. Chest pain, shortness of breath, fever, chills, nausea, vomiting, diarrhea, change in mentation, falling, weakness, bleeding. Severe pain or pain not relieved by medications. Or, any other signs or symptoms that you may have questions about. DISPOSITION: 
  Home With: 
 OT  PT  HH  RN  
  
x Long term SNF/Inpatient Rehab Independent/assisted living Hospice Other:  
 
 
PATIENT CONDITION AT DISCHARGE:  
 
Functional status Poor   
x Deconditioned Independent Cognition Alray Earing Forgetful   
x Dementia Catheters/lines (plus indication) Trejo PICC   
 PEG   
x None Code status  
 x Full code DNR   
 
PHYSICAL EXAMINATION AT DISCHARGE: 
 Refer to Progress Note CHRONIC MEDICAL DIAGNOSES: 
Problem List as of 11/8/2018 Date Reviewed: 12/13/2017 Codes Class Noted - Resolved * (Principal) Fall ICD-10-CM: W19. Cony Jenkins ICD-9-CM: E888.9  11/7/2018 - Present Generalized weakness ICD-10-CM: R53.1 ICD-9-CM: 780.79  12/11/2017 - Present Laceration of head ICD-10-CM: S01. 91XA ICD-9-CM: 873.8  12/11/2017 - Present Parkinson's disease dementia ([facility] ICD-10-CM: G20, F02.80 ICD-9-CM: 332.0, 294.10  5/25/2016 - Present Pre-syncope ICD-10-CM: R55 
ICD-9-CM: 780.2  8/19/2014 - Present Renal cell carcinoma (HCC) ICD-10-CM: C64.9 ICD-9-CM: 189.0  7/3/2014 - Present Overview Signed 7/3/2014  3:30 PM by Lubna Canales MD  
  S/p Left partial nephrectomy, 3/2014, History of screening mammography (Chronic) ICD-10-CM: Z92.89 ICD-9-CM: V15.89  3/7/2012 - Present Overview Addendum 3/12/2013  1:25 PM by Lubna Canales MD  
  3/12/13, normal, [facility] Parkinson disease [facility]) ICD-10-CM: G20 
ICD-9-CM: 332.0  1/19/2012 - Present Overview Addendum 7/3/2014  3:31 PM by Lubna Canales MD  
  Advanced disease, Dr. Ezra Vergara HTN (hypertension) ICD-10-CM: I10 
ICD-9-CM: 401.9  1/19/2012 - Present Hyperlipemia ICD-10-CM: E78.5 ICD-9-CM: 272.4  1/19/2012 - Present S/P TKR (total knee replacement) ICD-10-CM: V79.056 ICD-9-CM: V43.65  1/19/2012 - Present Overview Addendum 1/19/2012  8:16 PM by Lubna Canales MD  
  bilateral , 2000, Dr. Virginia Dominguez S/P radioactive iodine thyroid ablation ICD-10-CM: Z92.3 ICD-9-CM: V45.89  1/19/2012 - Present Overview Signed 1/19/2012  8:15 PM by Lubna Canales MD  
  0434 S/P NINI-BSO ICD-10-CM: Z90.710, Z90.722, Z90.79 ICD-9-CM: V88.01, V45.77  1/19/2012 - Present Environmental allergies ICD-10-CM: Z91.09 
ICD-9-CM: V15.09  1/19/2012 - Present Anemia ICD-10-CM: D64.9 ICD-9-CM: 285.9  1/19/2012 - Present RESOLVED: Dehydration ICD-10-CM: E86.0 ICD-9-CM: 276.51  12/11/2017 - 12/13/2017 RESOLVED: Acute hyperkalemia ICD-10-CM: E87.5 ICD-9-CM: 276.7  12/11/2017 - 12/13/2017 RESOLVED: Encephalopathy ICD-10-CM: G93.40 ICD-9-CM: 348.30  6/8/2015 - 5/25/2016 RESOLVED: Dehydration ICD-10-CM: E86.0 ICD-9-CM: 276.51  6/20/2014 - 5/25/2016 RESOLVED:  (acute kidney injury) ([facility] ICD-10-CM: N17.9 ICD-9-CM: 584.9  6/20/2014 - 5/25/2016 Greater than 40 minutes were spent with the patient on counseling and coordination of care Signed:  
Vladimir Zambrano MD 
 11/8/2018 
2:57 PM

## 2018-11-08 NOTE — PROGRESS NOTES
Hospitalist Progress Note Kelsy Garcia MD 
Answering service: [phone] OR [phone] from in house phone Cell: [phone] Date of Service:  2018 NAME:  Sam Hester :  1928 MRN:  [id number] Admission Summary: A 80 y.o lady w/ Parkinson's disease, HTN, CKD, dementia, who presents from her nursing home after a fall. She remembers falling but doesn't recall the events that led to her fall. Per reports, she had tripped and had a ground level fall and hit the right side of her head on a metal part of her bed. She came to the ED 3 days prior for a fall as well and was found to have a UTI, and was prescribed cephalexin. She denies headache, fever, dizziness, n/v, dysuria, abdominal pain. She has no acute complaints currently and says she feels fine. I spoke to her daughter over the phone who reports multiple falls for the past month. Interval history / Subjective: No acute event overnight Assessment & Plan:  
 
Right lateral occipital scalp laceration secondary to fall 
-s/p stapled in ER 
-continue local wound care Hx of recent UTI 
-diagnosed on 11/3 
-on cephalexin, add probiotics 
-UA no evidence of infection Recurrent fall multifactorial 
-CT head no acute intracranial disease 
-CT cervical spine no fracture. Cervical degenerative disc disease. 
-chest x ray no acute finding -PT/OT Anemia of chronic disease  
-H/H stable 
-monitor cbc HTN 
-BP not at goal 
-on coreg, hydralazine, losartan and monitor BP 
 
CKD stage III 
-continue normal saline 
-creatinine is trending up 
-avoid nephrotoxin 
-if creatinine worse further will hold losartan  
-repeat renal function in am 
 
Hx of Parkinson's disease  
-with multiple fall  
-continue sinemet Dementia  
-continue supportive care Code status: Full Code DVT prophylaxis: SCD Care Plan discussed with: Patient/Family, Nurse and  Disposition: From [address] I called and updated her daughter, Harmony Kolb at [phone], answered her questions on 11/8 Hospital Problems  Date Reviewed: 12/13/2017 Codes Class Noted POA * (Principal) Fall ICD-10-CM: W19. Stacey Fuller ICD-9-CM: F218.5  11/7/2018 Unknown Vital Signs:  
 Last 24hrs VS reviewed since prior progress note. Most recent are: 
Visit Vitals /64 (BP 1 Location: Right arm, BP Patient Position: At rest) Pulse 68 Temp 98.7 °F (37.1 °C) Resp 17 Ht 5' 4\" (1.626 m) SpO2 99% BMI 21.46 kg/m² Intake/Output Summary (Last 24 hours) at 11/8/2018 1005 Last data filed at 11/8/2018 7377 Gross per 24 hour Intake 5654.17 ml Output  Net 5654.17 ml Physical Examination:  
 
 
     
Constitutional:  No acute distress, cooperative, pleasant   
ENT:  Oral mucous moist, oropharynx benign. Neck supple, Resp:  CTA bilaterally. No wheezing/rhonchi/rales. No accessory muscle use CV:  Regular rhythm, normal rate, no murmurs, gallops, rubs GI:  Soft, non distended, non tender. normoactive bowel sounds, no hepatosplenomegaly Musculoskeletal:  No edema, warm Neurologic:  Conscious and alert, answer simple questions, moves all extremities,   
  Skin:  right side occipital scalp laceration s/p stples Data Review:  
 Review and/or order of clinical lab test 
 
 
Labs:  
 
Recent Labs 11/06/18 2237 WBC 5.9 HGB 9.0*  
HCT 29.5*  
 Recent Labs 11/08/18 
0326 11/06/18 2237  142  
K 5.1 4.9 * 112* CO2 21 22 BUN 36* 40* CREA 1.42* 1.68* GLU 74 103* CA 7.7* 8.7 MG  --  2.1 PHOS  --  3.7 Recent Labs 11/06/18 2237 SGOT 16 ALT 9*  
 TBILI 0.3 TP 7.1 ALB 2.9*  
GLOB 4.2* No results for input(s): INR, PTP, APTT in the last 72 hours. No lab exists for component: INREXT, INREXT No results for input(s): FE, TIBC, PSAT, FERR in the last 72 hours. No results found for: FOL, RBCF No results for input(s): PH, PCO2, PO2 in the last 72 hours. Recent Labs 11/06/18 2237  CKNDX 2.7*  
TROIQ <0.05 Lab Results Component Value Date/Time Cholesterol, total 210 (H) 12/12/2017 12:32 AM  
 HDL Cholesterol 63 12/12/2017 12:32 AM  
 LDL, calculated 133.4 (H) 12/12/2017 12:32 AM  
 Triglyceride 68 12/12/2017 12:32 AM  
 CHOL/HDL Ratio 3.3 12/12/2017 12:32 AM  
 
Lab Results Component Value Date/Time Glucose (POC) 87 03/09/2014 05:14 PM  
 Glucose (POC) 65 03/04/2014 08:41 AM  
 
Lab Results Component Value Date/Time Color YELLOW/STRAW 11/06/2018 10:37 PM  
 Appearance CLOUDY (A) 11/06/2018 10:37 PM  
 Specific gravity 1.026 11/06/2018 10:37 PM  
 pH (UA) 5.0 11/06/2018 10:37 PM  
 Protein 300 (A) 11/06/2018 10:37 PM  
 Glucose NEGATIVE  11/06/2018 10:37 PM  
 Ketone TRACE (A) 11/06/2018 10:37 PM  
 Bilirubin NEGATIVE  11/06/2018 10:37 PM  
 Urobilinogen 0.2 11/06/2018 10:37 PM  
 Nitrites NEGATIVE  11/06/2018 10:37 PM  
 Leukocyte Esterase NEGATIVE  11/06/2018 10:37 PM  
 Epithelial cells MODERATE (A) 11/06/2018 10:37 PM  
 Bacteria NEGATIVE  11/06/2018 10:37 PM  
 WBC 0-4 11/06/2018 10:37 PM  
 RBC 0-5 11/06/2018 10:37 PM  
 
 
 
Medications Reviewed:  
 
Current Facility-Administered Medications Medication Dose Route Frequency  carbidopa-levodopa (SINEMET)  mg per tablet 2 Tab  2 Tab Oral QID  carvedilol (COREG) tablet 3.125 mg  3.125 mg Oral BID WITH MEALS  docusate (COLACE) 50 mg/5 mL oral liquid 50 mg  50 mg Oral DAILY  hydrALAZINE (APRESOLINE) tablet 25 mg  25 mg Oral BID  losartan (COZAAR) tablet 12.5 mg  12.5 mg Oral DAILY  lovastatin (MEVACOR) tablet 20 mg  20 mg Oral DAILY  sodium chloride (NS) flush 5-10 mL  5-10 mL IntraVENous Q8H  
 sodium chloride (NS) flush 5-10 mL  5-10 mL IntraVENous PRN  
 acetaminophen (TYLENOL) tablet 650 mg  650 mg Oral Q4H PRN  
  ondansetron (ZOFRAN) injection 4 mg  4 mg IntraVENous Q4H PRN  
 lactobac ac& pc-s.therm-b.anim (EH Q/RISAQUAD)  1 Cap Oral DAILY  cephALEXin (KEFLEX) capsule 250 mg  250 mg Oral TID  sodium chloride (NS) flush 20 mL  20 mL InterCATHeter PRN  
 sodium chloride (NS) flush 10 mL  10 mL InterCATHeter Q24H  
 sodium chloride (NS) flush 10 mL  10 mL InterCATHeter PRN  
 sodium chloride (NS) flush 10 mL  10 mL InterCATHeter Q8H  
 alteplase (CATHFLO) 1 mg in sterile water (preservative free) 1 mL injection  1 mg InterCATHeter PRN  
 0.9% sodium chloride infusion  75 mL/hr IntraVENous CONTINUOUS  
 
______________________________________________________________________ EXPECTED LENGTH OF STAY: - - - 
ACTUAL LENGTH OF STAY:          0 Yari Medellin MD

## 2018-11-08 NOTE — PROGRESS NOTES
Bedside and Verbal shift change report given to Cristal Judd RN (oncoming nurse) by Sandy Duarte RN (offgoing nurse). Report included the following information SBAR, Kardex and MAR. Pt resting in bed with no distress noted. Call light in reach and bed in lowest position.

## 2018-11-08 NOTE — PROGRESS NOTES
Problem: Pressure Injury - Risk of 
Goal: *Prevention of pressure injury Document Chester Scale and appropriate interventions in the flowsheet. Outcome: Progressing Towards Goal 
Pressure Injury Interventions: 
Sensory Interventions: Keep linens dry and wrinkle-free, Assess changes in LOC Moisture Interventions: Absorbent underpads, Apply protective barrier, creams and emollients Activity Interventions: Increase time out of bed Mobility Interventions: Float heels, Pressure redistribution bed/mattress (bed type) Nutrition Interventions: Document food/fluid/supplement intake Friction and Shear Interventions: Lift sheet Problem: Falls - Risk of 
Goal: *Absence of Falls Document Shanita Carlson Fall Risk and appropriate interventions in the flowsheet. Outcome: Progressing Towards Goal 
Fall Risk Interventions: 
Mobility Interventions: Bed/chair exit alarm Mentation Interventions: Adequate sleep, hydration, pain control, Bed/chair exit alarm, Door open when patient unattended Medication Interventions: Bed/chair exit alarm Elimination Interventions: Call light in reach, Bed/chair exit alarm History of Falls Interventions: Bed/chair exit alarm, Door open when patient unattended

## 2018-11-22 ENCOUNTER — APPOINTMENT (OUTPATIENT)
Dept: INFUSION THERAPY | Age: 83
End: 2018-11-22
Payer: MEDICARE

## 2018-11-23 ENCOUNTER — APPOINTMENT (OUTPATIENT)
Dept: INFUSION THERAPY | Age: 83
End: 2018-11-23
Payer: MEDICARE

## 2018-11-28 ENCOUNTER — HOSPITAL ENCOUNTER (OUTPATIENT)
Dept: INFUSION THERAPY | Age: 83
Discharge: HOME | End: 2018-11-28
Payer: MEDICARE

## 2018-11-28 VITALS
HEART RATE: 78 BPM | RESPIRATION RATE: 18 BRPM | TEMPERATURE: 98.1 F | SYSTOLIC BLOOD PRESSURE: 154 MMHG | DIASTOLIC BLOOD PRESSURE: 74 MMHG

## 2018-11-28 LAB
ALBUMIN SERPL-MCNC: 2.6 G/DL (ref 3.5–5)
ANION GAP SERPL CALC-SCNC: 8 MMOL/L (ref 5–15)
BUN SERPL-MCNC: 28 MG/DL (ref 6–20)
BUN/CREAT SERPL: 23 (ref 12–20)
CALCIUM SERPL-MCNC: 7.8 MG/DL (ref 8.5–10.1)
CALCIUM SERPL-MCNC: 8.1 MG/DL (ref 8.5–10.1)
CHLORIDE SERPL-SCNC: 114 MMOL/L (ref 97–108)
CO2 SERPL-SCNC: 22 MMOL/L (ref 21–32)
CREAT SERPL-MCNC: 1.23 MG/DL (ref 0.55–1.02)
GLUCOSE SERPL-MCNC: 75 MG/DL (ref 65–100)
HCT VFR BLD AUTO: 30.6 % (ref 35–47)
HGB BLD-MCNC: 9.3 G/DL (ref 11.5–16)
PHOSPHATE SERPL-MCNC: 3.5 MG/DL (ref 2.6–4.7)
POTASSIUM SERPL-SCNC: 4.8 MMOL/L (ref 3.5–5.1)
PTH-INTACT SERPL-MCNC: 321.8 PG/ML (ref 18.4–88)
SODIUM SERPL-SCNC: 144 MMOL/L (ref 136–145)
URATE SERPL-MCNC: 8 MG/DL (ref 2.6–6)

## 2018-11-28 PROCEDURE — 83970 ASSAY OF PARATHORMONE: CPT

## 2018-11-28 PROCEDURE — 96372 THER/PROPH/DIAG INJ SC/IM: CPT

## 2018-11-28 PROCEDURE — 77030012965 HC NDL HUBR BBMI -A

## 2018-11-28 PROCEDURE — 74011250636 HC RX REV CODE- 250/636: Performed by: INTERNAL MEDICINE

## 2018-11-28 PROCEDURE — 36591 DRAW BLOOD OFF VENOUS DEVICE: CPT

## 2018-11-28 PROCEDURE — 74011000250 HC RX REV CODE- 250: Performed by: INTERNAL MEDICINE

## 2018-11-28 PROCEDURE — 80069 RENAL FUNCTION PANEL: CPT

## 2018-11-28 PROCEDURE — 36415 COLL VENOUS BLD VENIPUNCTURE: CPT

## 2018-11-28 PROCEDURE — 85018 HEMOGLOBIN: CPT

## 2018-11-28 PROCEDURE — 84550 ASSAY OF BLOOD/URIC ACID: CPT

## 2018-11-28 RX ORDER — HEPARIN 100 UNIT/ML
500 SYRINGE INTRAVENOUS AS NEEDED
Status: ACTIVE | OUTPATIENT
Start: 2018-11-28 | End: 2018-11-29

## 2018-11-28 RX ORDER — SODIUM CHLORIDE 9 MG/ML
10 INJECTION INTRAMUSCULAR; INTRAVENOUS; SUBCUTANEOUS AS NEEDED
Status: ACTIVE | OUTPATIENT
Start: 2018-11-28 | End: 2018-11-29

## 2018-11-28 RX ORDER — SODIUM CHLORIDE 0.9 % (FLUSH) 0.9 %
5-10 SYRINGE (ML) INJECTION AS NEEDED
Status: ACTIVE | OUTPATIENT
Start: 2018-11-28 | End: 2018-11-29

## 2018-11-28 RX ADMIN — SODIUM CHLORIDE 10 ML: 9 INJECTION, SOLUTION INTRAMUSCULAR; INTRAVENOUS; SUBCUTANEOUS at 13:45

## 2018-11-28 RX ADMIN — SODIUM CHLORIDE, PRESERVATIVE FREE 500 UNITS: 5 INJECTION INTRAVENOUS at 13:51

## 2018-11-28 RX ADMIN — Medication 10 ML: at 13:50

## 2018-11-28 RX ADMIN — ERYTHROPOIETIN 20000 UNITS: 20000 INJECTION, SOLUTION INTRAVENOUS; SUBCUTANEOUS at 14:48

## 2018-11-28 NOTE — PROGRESS NOTES
[facility] Short Consult Note (Labs/Type & Cross/Portflush/Antibiotic/Non-Chemo injections) Date: 2018 Name: Moy Parkinson MRN: [id number] : 1928 Treatment: Procrit Ms. Anna was assessed and education was provided, port accessed per protocol, labs drawn and sent, port flushed and de-accessed per protocol. Ms. Marco Navraro vitals were reviewed and patient was observed for 5 minutes prior to treatment. Visit Vitals /74 Pulse 78 Temp 98.1 °F (36.7 °C) Resp 18 Lab results were obtained and reviewed. Recent Results (from the past 12 hour(s)) RENAL FUNCTION PANEL Collection Time: 18  1:37 PM  
Result Value Ref Range Sodium 144 136 - 145 mmol/L Potassium 4.8 3.5 - 5.1 mmol/L Chloride 114 (H) 97 - 108 mmol/L  
 CO2 22 21 - 32 mmol/L Anion gap 8 5 - 15 mmol/L Glucose 75 65 - 100 mg/dL BUN 28 (H) 6 - 20 MG/DL Creatinine 1.23 (H) 0.55 - 1.02 MG/DL  
 BUN/Creatinine ratio 23 (H) 12 - 20 GFR est AA 50 (L) >60 ml/min/1.73m2 GFR est non-AA 41 (L) >60 ml/min/1.73m2 Calcium 7.8 (L) 8.5 - 10.1 MG/DL Phosphorus 3.5 2.6 - 4.7 MG/DL Albumin 2.6 (L) 3.5 - 5.0 g/dL URIC ACID Collection Time: 18  1:37 PM  
Result Value Ref Range Uric acid 8.0 (H) 2.6 - 6.0 MG/DL  
PTH INTACT Collection Time: 18  1:37 PM  
Result Value Ref Range Calcium PENDING MG/DL  
 PTH, Intact 321.8 (H) 18.4 - 88.0 pg/mL HGB & HCT Collection Time: 18  1:37 PM  
Result Value Ref Range HGB 9.3 (L) 11.5 - 16.0 g/dL HCT 30.6 (L) 35.0 - 47.0 % Procrit 20,000 units SC given in right arm. Ms. Grace Potts tolerated the injection, and had no complaints. Ms. Grace Potts was discharged from [place] [id number] in stable condition via W/C with Aide to nursing home at 1450. She is to return on 18 for her next appointment. Francheska Smith 2018 5:35 PM

## 2018-12-26 ENCOUNTER — HOSPITAL ENCOUNTER (OUTPATIENT)
Dept: INFUSION THERAPY | Age: 83
Discharge: HOME | End: 2018-12-26
Payer: MEDICARE

## 2018-12-26 ENCOUNTER — APPOINTMENT (OUTPATIENT)
Dept: INFUSION THERAPY | Age: 83
End: 2018-12-26
Payer: MEDICARE

## 2018-12-26 VITALS
DIASTOLIC BLOOD PRESSURE: 69 MMHG | SYSTOLIC BLOOD PRESSURE: 172 MMHG | TEMPERATURE: 98 F | HEART RATE: 58 BPM | RESPIRATION RATE: 18 BRPM

## 2018-12-26 LAB
ALBUMIN SERPL-MCNC: 2.2 G/DL (ref 3.5–5)
ANION GAP SERPL CALC-SCNC: 4 MMOL/L (ref 5–15)
BUN SERPL-MCNC: 37 MG/DL (ref 6–20)
BUN/CREAT SERPL: 31 (ref 12–20)
CALCIUM SERPL-MCNC: 8.3 MG/DL (ref 8.5–10.1)
CALCIUM SERPL-MCNC: 8.6 MG/DL (ref 8.5–10.1)
CHLORIDE SERPL-SCNC: 115 MMOL/L (ref 97–108)
CO2 SERPL-SCNC: 25 MMOL/L (ref 21–32)
CREAT SERPL-MCNC: 1.21 MG/DL (ref 0.55–1.02)
GLUCOSE SERPL-MCNC: 81 MG/DL (ref 65–100)
HCT VFR BLD AUTO: 32.5 % (ref 35–47)
HGB BLD-MCNC: 10 G/DL (ref 11.5–16)
PHOSPHATE SERPL-MCNC: 3.1 MG/DL (ref 2.6–4.7)
POTASSIUM SERPL-SCNC: 4.7 MMOL/L (ref 3.5–5.1)
PTH-INTACT SERPL-MCNC: 207.8 PG/ML (ref 18.4–88)
SODIUM SERPL-SCNC: 144 MMOL/L (ref 136–145)
URATE SERPL-MCNC: 7.3 MG/DL (ref 2.6–6)

## 2018-12-26 PROCEDURE — 74011000250 HC RX REV CODE- 250: Performed by: INTERNAL MEDICINE

## 2018-12-26 PROCEDURE — 96523 IRRIG DRUG DELIVERY DEVICE: CPT

## 2018-12-26 PROCEDURE — 36591 DRAW BLOOD OFF VENOUS DEVICE: CPT

## 2018-12-26 PROCEDURE — 84550 ASSAY OF BLOOD/URIC ACID: CPT

## 2018-12-26 PROCEDURE — 80069 RENAL FUNCTION PANEL: CPT

## 2018-12-26 PROCEDURE — 85018 HEMOGLOBIN: CPT

## 2018-12-26 PROCEDURE — 74011250636 HC RX REV CODE- 250/636: Performed by: INTERNAL MEDICINE

## 2018-12-26 PROCEDURE — 96372 THER/PROPH/DIAG INJ SC/IM: CPT

## 2018-12-26 PROCEDURE — 36415 COLL VENOUS BLD VENIPUNCTURE: CPT

## 2018-12-26 PROCEDURE — 83970 ASSAY OF PARATHORMONE: CPT

## 2018-12-26 PROCEDURE — 77030012965 HC NDL HUBR BBMI -A

## 2018-12-26 RX ORDER — HEPARIN 100 UNIT/ML
500 SYRINGE INTRAVENOUS AS NEEDED
Status: DISPENSED | OUTPATIENT
Start: 2018-12-26 | End: 2018-12-27

## 2018-12-26 RX ORDER — LANOLIN ALCOHOL/MO/W.PET/CERES
CREAM (GRAM) TOPICAL
COMMUNITY

## 2018-12-26 RX ORDER — POLYETHYLENE GLYCOL 3350 17 G/17G
17 POWDER, FOR SOLUTION ORAL DAILY
COMMUNITY

## 2018-12-26 RX ORDER — SODIUM CHLORIDE 0.9 % (FLUSH) 0.9 %
5-10 SYRINGE (ML) INJECTION AS NEEDED
Status: DISPENSED | OUTPATIENT
Start: 2018-12-26 | End: 2018-12-27

## 2018-12-26 RX ORDER — SODIUM CHLORIDE 9 MG/ML
10 INJECTION INTRAMUSCULAR; INTRAVENOUS; SUBCUTANEOUS AS NEEDED
Status: DISPENSED | OUTPATIENT
Start: 2018-12-26 | End: 2018-12-27

## 2018-12-26 RX ADMIN — SODIUM CHLORIDE 10 ML: 9 INJECTION, SOLUTION INTRAMUSCULAR; INTRAVENOUS; SUBCUTANEOUS at 09:15

## 2018-12-26 RX ADMIN — SODIUM CHLORIDE, PRESERVATIVE FREE 500 UNITS: 5 INJECTION INTRAVENOUS at 09:15

## 2018-12-26 RX ADMIN — ERYTHROPOIETIN 20000 UNITS: 20000 INJECTION, SOLUTION INTRAVENOUS; SUBCUTANEOUS at 09:52

## 2018-12-26 RX ADMIN — Medication 10 ML: at 09:13

## 2018-12-26 NOTE — PROGRESS NOTES
JANNA [place] [id number] [facility] VISIT NOTE    12 Patient arrives for Procrit [id number]/labs/port flush without acute problems. Please see connect care for complete assessment and education provided. All central lines follow the [facility]. Vital signs stable throughout and prior to discharge, Pt. Tolerated treatment well and discharged without incident. Patient/parent is aware of next Adult [facility] appointment on 1/23/2019. Appointment card given to patient/parents. Medications Verified by Jeramie Cummings RN & Anel Huston RN via Micromedex:  1. Procrit 20,000 units  2. Heparin 500 units    VITAL SIGNS   Patient Vitals for the past 12 hrs:   Temp Pulse Resp BP   12/26/18 0852 98 °F (36.7 °C) (!) 58 18 172/69     LAB WORK Lab results pending, please see Connect Care for results.   Recent Results (from the past 12 hour(s))   HGB & HCT    Collection Time: 12/26/18  8:54 AM   Result Value Ref Range    HGB 10.0 (L) 11.5 - 16.0 g/dL    HCT 32.5 (L) 35.0 - 47.0 %   URIC ACID    Collection Time: 12/26/18  8:54 AM   Result Value Ref Range    Uric acid 7.3 (H) 2.6 - 6.0 MG/DL   RENAL FUNCTION PANEL    Collection Time: 12/26/18  8:54 AM   Result Value Ref Range    Sodium 144 136 - 145 mmol/L    Potassium 4.7 3.5 - 5.1 mmol/L    Chloride 115 (H) 97 - 108 mmol/L    CO2 25 21 - 32 mmol/L    Anion gap 4 (L) 5 - 15 mmol/L    Glucose 81 65 - 100 mg/dL    BUN 37 (H) 6 - 20 MG/DL    Creatinine 1.21 (H) 0.55 - 1.02 MG/DL    BUN/Creatinine ratio 31 (H) 12 - 20      GFR est AA 51 (L) >60 ml/min/1.73m2    GFR est non-AA 42 (L) >60 ml/min/1.73m2    Calcium 8.3 (L) 8.5 - 10.1 MG/DL    Phosphorus 3.1 2.6 - 4.7 MG/DL    Albumin 2.2 (L) 3.5 - 5.0 g/dL

## 2019-01-01 ENCOUNTER — APPOINTMENT (OUTPATIENT)
Dept: INFUSION THERAPY | Age: 84
End: 2019-01-01

## 2019-01-01 ENCOUNTER — HOSPITAL ENCOUNTER (OUTPATIENT)
Dept: INFUSION THERAPY | Age: 84
Discharge: HOME | End: 2019-03-29
Payer: MEDICARE

## 2019-01-01 ENCOUNTER — APPOINTMENT (OUTPATIENT)
Dept: INFUSION THERAPY | Age: 84
End: 2019-01-01
Payer: MEDICARE

## 2019-01-01 ENCOUNTER — APPOINTMENT (OUTPATIENT)
Dept: CT IMAGING | Age: 84
End: 2019-01-01
Attending: EMERGENCY MEDICINE
Payer: MEDICARE

## 2019-01-01 ENCOUNTER — HOSPITAL ENCOUNTER (OUTPATIENT)
Dept: INFUSION THERAPY | Age: 84
Discharge: HOME | End: 2019-03-27
Payer: MEDICARE

## 2019-01-01 ENCOUNTER — HOSPITAL ENCOUNTER (OUTPATIENT)
Dept: INFUSION THERAPY | Age: 84
Discharge: HOME | End: 2019-07-03
Payer: MEDICARE

## 2019-01-01 ENCOUNTER — HOSPITAL ENCOUNTER (OUTPATIENT)
Dept: INFUSION THERAPY | Age: 84
Discharge: HOME | End: 2019-05-15
Payer: MEDICARE

## 2019-01-01 ENCOUNTER — APPOINTMENT (OUTPATIENT)
Dept: GENERAL RADIOLOGY | Age: 84
DRG: 194 | End: 2019-01-01
Attending: EMERGENCY MEDICINE
Payer: MEDICARE

## 2019-01-01 ENCOUNTER — HOSPITAL ENCOUNTER (OUTPATIENT)
Dept: INFUSION THERAPY | Age: 84
Discharge: HOME | End: 2019-06-12

## 2019-01-01 ENCOUNTER — HOSPITAL ENCOUNTER (INPATIENT)
Age: 84
LOS: 7 days | Discharge: NURSING, REHAB OR LONG-TERM CARE | DRG: 194 | End: 2020-01-08
Attending: EMERGENCY MEDICINE | Admitting: INTERNAL MEDICINE
Payer: MEDICARE

## 2019-01-01 ENCOUNTER — HOSPITAL ENCOUNTER (OUTPATIENT)
Dept: INFUSION THERAPY | Age: 84
Discharge: HOME | End: 2019-04-24

## 2019-01-01 ENCOUNTER — HOSPITAL ENCOUNTER (EMERGENCY)
Age: 84
Discharge: HOME | End: 2019-05-19
Attending: EMERGENCY MEDICINE | Admitting: EMERGENCY MEDICINE
Payer: MEDICARE

## 2019-01-01 VITALS
HEIGHT: 65 IN | SYSTOLIC BLOOD PRESSURE: 172 MMHG | BODY MASS INDEX: 24.16 KG/M2 | RESPIRATION RATE: 17 BRPM | HEART RATE: 78 BPM | TEMPERATURE: 98.3 F | DIASTOLIC BLOOD PRESSURE: 57 MMHG | OXYGEN SATURATION: 100 % | WEIGHT: 145 LBS

## 2019-01-01 VITALS
RESPIRATION RATE: 18 BRPM | HEART RATE: 44 BPM | TEMPERATURE: 96.9 F | SYSTOLIC BLOOD PRESSURE: 141 MMHG | DIASTOLIC BLOOD PRESSURE: 53 MMHG

## 2019-01-01 VITALS
HEART RATE: 45 BPM | RESPIRATION RATE: 18 BRPM | SYSTOLIC BLOOD PRESSURE: 113 MMHG | TEMPERATURE: 97 F | DIASTOLIC BLOOD PRESSURE: 53 MMHG | OXYGEN SATURATION: 95 %

## 2019-01-01 VITALS
RESPIRATION RATE: 18 BRPM | DIASTOLIC BLOOD PRESSURE: 70 MMHG | SYSTOLIC BLOOD PRESSURE: 182 MMHG | HEART RATE: 56 BPM | TEMPERATURE: 97.6 F

## 2019-01-01 DIAGNOSIS — S09.90XA CLOSED HEAD INJURY, INITIAL ENCOUNTER: Primary | ICD-10-CM

## 2019-01-01 DIAGNOSIS — S01.111A LACERATION OF RIGHT PERIOCULAR AREA WITHOUT FOREIGN BODY, INITIAL ENCOUNTER: ICD-10-CM

## 2019-01-01 DIAGNOSIS — T68.XXXA HYPOTHERMIA, INITIAL ENCOUNTER: Primary | ICD-10-CM
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
## 2019-01-01 DIAGNOSIS — J18.9 PNEUMONIA OF BOTH LOWER LOBES DUE TO INFECTIOUS ORGANISM: ICD-10-CM

## 2019-01-01 DIAGNOSIS — N17.9 ACUTE KIDNEY INJURY (HCC): ICD-10-CM

## 2019-01-01 LAB
ALBUMIN SERPL-MCNC: 2.2 G/DL (ref 3.5–5)
ALBUMIN SERPL-MCNC: 2.3 G/DL (ref 3.5–5)
ALBUMIN SERPL-MCNC: 2.4 G/DL (ref 3.5–5)
ALBUMIN SERPL-MCNC: 2.5 G/DL (ref 3.5–5)
ALBUMIN/GLOB SERPL: 0.6 {RATIO} (ref 1.1–2.2)
ALP SERPL-CCNC: 70 U/L (ref 45–117)
ALT SERPL-CCNC: 7 U/L (ref 12–78)
ANION GAP SERPL CALC-SCNC: 4 MMOL/L (ref 5–15)
ANION GAP SERPL CALC-SCNC: 4 MMOL/L (ref 5–15)
ANION GAP SERPL CALC-SCNC: 5 MMOL/L (ref 5–15)
ANION GAP SERPL CALC-SCNC: 6 MMOL/L (ref 5–15)
AST SERPL-CCNC: 25 U/L (ref 15–37)
BASOPHILS # BLD: 0 K/UL (ref 0–0.1)
BASOPHILS NFR BLD: 1 % (ref 0–1)
BILIRUB SERPL-MCNC: 0.2 MG/DL (ref 0.2–1)
BUN SERPL-MCNC: 51 MG/DL (ref 6–20)
BUN SERPL-MCNC: 52 MG/DL (ref 6–20)
BUN SERPL-MCNC: 56 MG/DL (ref 6–20)
BUN SERPL-MCNC: 63 MG/DL (ref 6–20)
BUN/CREAT SERPL: 26 (ref 12–20)
BUN/CREAT SERPL: 31 (ref 12–20)
BUN/CREAT SERPL: 34 (ref 12–20)
BUN/CREAT SERPL: 38 (ref 12–20)
CALCIUM SERPL-MCNC: 8.2 MG/DL (ref 8.5–10.1)
CALCIUM SERPL-MCNC: 8.3 MG/DL (ref 8.5–10.1)
CALCIUM SERPL-MCNC: 8.3 MG/DL (ref 8.5–10.1)
CALCIUM SERPL-MCNC: 8.4 MG/DL (ref 8.5–10.1)
CALCIUM SERPL-MCNC: 8.4 MG/DL (ref 8.5–10.1)
CALCIUM SERPL-MCNC: 8.5 MG/DL (ref 8.5–10.1)
CALCIUM SERPL-MCNC: 8.6 MG/DL (ref 8.5–10.1)
CHLORIDE SERPL-SCNC: 114 MMOL/L (ref 97–108)
CHLORIDE SERPL-SCNC: 116 MMOL/L (ref 97–108)
CHLORIDE SERPL-SCNC: 117 MMOL/L (ref 97–108)
CHLORIDE SERPL-SCNC: 119 MMOL/L (ref 97–108)
CO2 SERPL-SCNC: 21 MMOL/L (ref 21–32)
CO2 SERPL-SCNC: 23 MMOL/L (ref 21–32)
CO2 SERPL-SCNC: 23 MMOL/L (ref 21–32)
CO2 SERPL-SCNC: 24 MMOL/L (ref 21–32)
COMMENT, HOLDF: NORMAL
CREAT SERPL-MCNC: 1.54 MG/DL (ref 0.55–1.02)
CREAT SERPL-MCNC: 1.65 MG/DL (ref 0.55–1.02)
CREAT SERPL-MCNC: 1.68 MG/DL (ref 0.55–1.02)
CREAT SERPL-MCNC: 2.16 MG/DL (ref 0.55–1.02)
DIFFERENTIAL METHOD BLD: ABNORMAL
EOSINOPHIL # BLD: 0.1 K/UL (ref 0–0.4)
EOSINOPHIL NFR BLD: 2 % (ref 0–7)
ERYTHROCYTE [DISTWIDTH] IN BLOOD BY AUTOMATED COUNT: 14.4 % (ref 11.5–14.5)
GLOBULIN SER CALC-MCNC: 4 G/DL (ref 2–4)
GLUCOSE SERPL-MCNC: 105 MG/DL (ref 65–100)
GLUCOSE SERPL-MCNC: 81 MG/DL (ref 65–100)
GLUCOSE SERPL-MCNC: 89 MG/DL (ref 65–100)
GLUCOSE SERPL-MCNC: 95 MG/DL (ref 65–100)
HCT VFR BLD AUTO: 24.6 % (ref 35–47)
HCT VFR BLD AUTO: 25.4 % (ref 35–47)
HCT VFR BLD AUTO: 26.1 % (ref 35–47)
HCT VFR BLD AUTO: 27.5 % (ref 35–47)
HGB BLD-MCNC: 7.6 G/DL (ref 11.5–16)
HGB BLD-MCNC: 7.7 G/DL (ref 11.5–16)
HGB BLD-MCNC: 7.7 G/DL (ref 11.5–16)
HGB BLD-MCNC: 8.3 G/DL (ref 11.5–16)
IMM GRANULOCYTES # BLD AUTO: 0 K/UL (ref 0–0.04)
IMM GRANULOCYTES NFR BLD AUTO: 1 % (ref 0–0.5)
LACTATE BLD-SCNC: <0.3 MMOL/L (ref 0.4–2)
LYMPHOCYTES # BLD: 1.2 K/UL (ref 0.8–3.5)
LYMPHOCYTES NFR BLD: 31 % (ref 12–49)
MCH RBC QN AUTO: 31.3 PG (ref 26–34)
MCHC RBC AUTO-ENTMCNC: 29.1 G/DL (ref 30–36.5)
MCV RBC AUTO: 107.4 FL (ref 80–99)
MONOCYTES # BLD: 0.4 K/UL (ref 0–1)
MONOCYTES NFR BLD: 11 % (ref 5–13)
NEUTS SEG # BLD: 2.2 K/UL (ref 1.8–8)
NEUTS SEG NFR BLD: 54 % (ref 32–75)
NRBC # BLD: 0 K/UL (ref 0–0.01)
NRBC BLD-RTO: 0 PER 100 WBC
PHOSPHATE SERPL-MCNC: 3.4 MG/DL (ref 2.6–4.7)
PHOSPHATE SERPL-MCNC: 3.6 MG/DL (ref 2.6–4.7)
PHOSPHATE SERPL-MCNC: 3.8 MG/DL (ref 2.6–4.7)
PLATELET # BLD AUTO: 182 K/UL (ref 150–400)
PMV BLD AUTO: 9.8 FL (ref 8.9–12.9)
POTASSIUM SERPL-SCNC: 4.7 MMOL/L (ref 3.5–5.1)
POTASSIUM SERPL-SCNC: 4.8 MMOL/L (ref 3.5–5.1)
POTASSIUM SERPL-SCNC: 5 MMOL/L (ref 3.5–5.1)
POTASSIUM SERPL-SCNC: 5.2 MMOL/L (ref 3.5–5.1)
PROT SERPL-MCNC: 6.2 G/DL (ref 6.4–8.2)
PTH-INTACT SERPL-MCNC: 186.8 PG/ML (ref 18.4–88)
PTH-INTACT SERPL-MCNC: 224.7 PG/ML (ref 18.4–88)
PTH-INTACT SERPL-MCNC: 336.7 PG/ML (ref 18.4–88)
RBC # BLD AUTO: 2.43 M/UL (ref 3.8–5.2)
SAMPLES BEING HELD,HOLD: NORMAL
SODIUM SERPL-SCNC: 142 MMOL/L (ref 136–145)
SODIUM SERPL-SCNC: 143 MMOL/L (ref 136–145)
SODIUM SERPL-SCNC: 144 MMOL/L (ref 136–145)
SODIUM SERPL-SCNC: 147 MMOL/L (ref 136–145)
URATE SERPL-MCNC: 7.1 MG/DL (ref 2.6–6)
URATE SERPL-MCNC: 7.3 MG/DL (ref 2.6–6)
WBC # BLD AUTO: 4 K/UL (ref 3.6–11)

## 2019-01-01 PROCEDURE — 74011250636 HC RX REV CODE- 250/636: Performed by: INTERNAL MEDICINE

## 2019-01-01 PROCEDURE — 87077 CULTURE AEROBIC IDENTIFY: CPT

## 2019-01-01 PROCEDURE — 85018 HEMOGLOBIN: CPT

## 2019-01-01 PROCEDURE — 36415 COLL VENOUS BLD VENIPUNCTURE: CPT

## 2019-01-01 PROCEDURE — 80053 COMPREHEN METABOLIC PANEL: CPT

## 2019-01-01 PROCEDURE — 83880 ASSAY OF NATRIURETIC PEPTIDE: CPT

## 2019-01-01 PROCEDURE — 80069 RENAL FUNCTION PANEL: CPT

## 2019-01-01 PROCEDURE — 96372 THER/PROPH/DIAG INJ SC/IM: CPT

## 2019-01-01 PROCEDURE — 87086 URINE CULTURE/COLONY COUNT: CPT

## 2019-01-01 PROCEDURE — 84550 ASSAY OF BLOOD/URIC ACID: CPT

## 2019-01-01 PROCEDURE — 71045 X-RAY EXAM CHEST 1 VIEW: CPT

## 2019-01-01 PROCEDURE — 70450 CT HEAD/BRAIN W/O DYE: CPT

## 2019-01-01 PROCEDURE — 74011000250 HC RX REV CODE- 250: Performed by: INTERNAL MEDICINE

## 2019-01-01 PROCEDURE — 77030040922 HC BLNKT HYPOTHRM STRY -A

## 2019-01-01 PROCEDURE — 96365 THER/PROPH/DIAG IV INF INIT: CPT

## 2019-01-01 PROCEDURE — 81001 URINALYSIS AUTO W/SCOPE: CPT

## 2019-01-01 PROCEDURE — 83970 ASSAY OF PARATHORMONE: CPT

## 2019-01-01 PROCEDURE — 87186 SC STD MICRODIL/AGAR DIL: CPT

## 2019-01-01 PROCEDURE — 72125 CT NECK SPINE W/O DYE: CPT

## 2019-01-01 PROCEDURE — 85025 COMPLETE CBC W/AUTO DIFF WBC: CPT

## 2019-01-01 PROCEDURE — 94640 AIRWAY INHALATION TREATMENT: CPT

## 2019-01-01 PROCEDURE — 87040 BLOOD CULTURE FOR BACTERIA: CPT

## 2019-01-01 PROCEDURE — 99285 EMERGENCY DEPT VISIT HI MDM: CPT

## 2019-01-01 PROCEDURE — 74011000250 HC RX REV CODE- 250: Performed by: EMERGENCY MEDICINE

## 2019-01-01 PROCEDURE — 77030019905 HC CATH URETH INTMIT MDII -A

## 2019-01-01 PROCEDURE — 83605 ASSAY OF LACTIC ACID: CPT

## 2019-01-01 RX ORDER — SODIUM CHLORIDE 9 MG/ML
10 INJECTION INTRAMUSCULAR; INTRAVENOUS; SUBCUTANEOUS AS NEEDED
Status: DISCONTINUED | OUTPATIENT
Start: 2019-01-01 | End: 2019-01-01 | Stop reason: HOSPADM

## 2019-01-01 RX ORDER — HEPARIN 100 UNIT/ML
500 SYRINGE INTRAVENOUS AS NEEDED
Status: DISCONTINUED | OUTPATIENT
Start: 2019-01-01 | End: 2019-01-01 | Stop reason: HOSPADM

## 2019-01-01 RX ORDER — SODIUM CHLORIDE 0.9 % (FLUSH) 0.9 %
10-40 SYRINGE (ML) INJECTION AS NEEDED
Status: DISCONTINUED | OUTPATIENT
Start: 2019-01-01 | End: 2019-01-01 | Stop reason: HOSPADM

## 2019-01-01 RX ORDER — LEVOFLOXACIN 5 MG/ML
750 INJECTION, SOLUTION INTRAVENOUS
Status: COMPLETED | OUTPATIENT
Start: 2019-01-01 | End: 2020-01-01

## 2019-01-01 RX ADMIN — SODIUM CHLORIDE 10 ML: 9 INJECTION, SOLUTION INTRAMUSCULAR; INTRAVENOUS; SUBCUTANEOUS at 14:14

## 2019-01-01 RX ADMIN — ALBUTEROL SULFATE 1 DOSE: 2.5 SOLUTION RESPIRATORY (INHALATION) at 22:42

## 2019-01-01 RX ADMIN — EPOETIN ALFA-EPBX 40000 UNITS: 40000 INJECTION, SOLUTION INTRAVENOUS; SUBCUTANEOUS at 14:57

## 2019-01-01 RX ADMIN — ERYTHROPOIETIN 20000 UNITS: 20000 INJECTION, SOLUTION INTRAVENOUS; SUBCUTANEOUS at 14:25

## 2019-01-01 RX ADMIN — EPOETIN ALFA-EPBX 40000 UNITS: 40000 INJECTION, SOLUTION INTRAVENOUS; SUBCUTANEOUS at 15:23

## 2019-01-01 RX ADMIN — Medication 20 ML: at 14:15

## 2019-01-01 RX ADMIN — Medication 500 UNITS: at 14:17

## 2019-01-23 ENCOUNTER — HOSPITAL ENCOUNTER (OUTPATIENT)
Dept: INFUSION THERAPY | Age: 84
Discharge: HOME | End: 2019-01-23
Payer: MEDICARE

## 2019-01-23 VITALS
SYSTOLIC BLOOD PRESSURE: 185 MMHG | TEMPERATURE: 98.4 F | RESPIRATION RATE: 18 BRPM | DIASTOLIC BLOOD PRESSURE: 72 MMHG | HEART RATE: 58 BPM

## 2019-01-23 LAB
ALBUMIN SERPL-MCNC: 2.3 G/DL (ref 3.5–5)
ANION GAP SERPL CALC-SCNC: 6 MMOL/L (ref 5–15)
BUN SERPL-MCNC: 48 MG/DL (ref 6–20)
BUN/CREAT SERPL: 32 (ref 12–20)
CALCIUM SERPL-MCNC: 8.5 MG/DL (ref 8.5–10.1)
CHLORIDE SERPL-SCNC: 113 MMOL/L (ref 97–108)
CO2 SERPL-SCNC: 20 MMOL/L (ref 21–32)
CREAT SERPL-MCNC: 1.49 MG/DL (ref 0.55–1.02)
GLUCOSE SERPL-MCNC: 71 MG/DL (ref 65–100)
HCT VFR BLD AUTO: 38.2 % (ref 35–47)
HGB BLD-MCNC: 11.6 G/DL (ref 11.5–16)
PHOSPHATE SERPL-MCNC: 3.6 MG/DL (ref 2.6–4.7)
POTASSIUM SERPL-SCNC: 5 MMOL/L (ref 3.5–5.1)
SODIUM SERPL-SCNC: 139 MMOL/L (ref 136–145)
URATE SERPL-MCNC: 7.6 MG/DL (ref 2.6–6)

## 2019-01-23 PROCEDURE — 36415 COLL VENOUS BLD VENIPUNCTURE: CPT

## 2019-01-23 PROCEDURE — 85018 HEMOGLOBIN: CPT

## 2019-01-23 PROCEDURE — 80069 RENAL FUNCTION PANEL: CPT

## 2019-01-23 PROCEDURE — 84550 ASSAY OF BLOOD/URIC ACID: CPT

## 2019-01-23 NOTE — PROGRESS NOTES
Outpatient Infusion Center Short Visit Progress Note 1300 Pt admit to [facility] for labs and possible procrit via wheelchair in stable condition accompanied by home care aide. Assessment completed. No new concerns voiced. Visit Vitals /72 Pulse (!) 58 Temp 98.4 °F (36.9 °C) Resp 18 Labs drawn peripherally and sent for processing. Medications: No Procrit needed 1330 Pt tolerated treatment well. D/c home ambulatory in no distress. Pt aware of next appointment scheduled for 2/27/19 at 1pm. 
 
Recent Results (from the past 12 hour(s)) HGB & HCT Collection Time: 01/23/19  1:26 PM  
Result Value Ref Range HGB 11.6 11.5 - 16.0 g/dL HCT 38.2 35.0 - 47.0 % RENAL FUNCTION PANEL Collection Time: 01/23/19  1:26 PM  
Result Value Ref Range Sodium 139 136 - 145 mmol/L Potassium 5.0 3.5 - 5.1 mmol/L Chloride 113 (H) 97 - 108 mmol/L  
 CO2 20 (L) 21 - 32 mmol/L Anion gap 6 5 - 15 mmol/L Glucose 71 65 - 100 mg/dL BUN 48 (H) 6 - 20 MG/DL Creatinine 1.49 (H) 0.55 - 1.02 MG/DL  
 BUN/Creatinine ratio 32 (H) 12 - 20 GFR est AA 40 (L) >60 ml/min/1.73m2 GFR est non-AA 33 (L) >60 ml/min/1.73m2 Calcium 8.5 8.5 - 10.1 MG/DL Phosphorus 3.6 2.6 - 4.7 MG/DL Albumin 2.3 (L) 3.5 - 5.0 g/dL URIC ACID Collection Time: 01/23/19  1:26 PM  
Result Value Ref Range  Uric acid 7.6 (H) 2.6 - 6.0 MG/DL

## 2019-03-29 NOTE — PROGRESS NOTES
1300 Pt arrived at [facility] ambulatory and in no acute distress for injection. Patient Vitals for the past 12 hrs: 
 Temp Pulse Resp BP  
03/29/19 1307 97.6 °F (36.4 °C) (!) 56 18 182/70 Recent Results (from the past 12 hour(s)) HGB & HCT Collection Time: 03/29/19  1:12 PM  
Result Value Ref Range HGB 7.7 (L) 11.5 - 16.0 g/dL HCT 25.4 (L) 35.0 - 47.0 % RENAL FUNCTION PANEL Collection Time: 03/29/19  1:12 PM  
Result Value Ref Range Sodium 142 136 - 145 mmol/L Potassium 5.0 3.5 - 5.1 mmol/L Chloride 117 (H) 97 - 108 mmol/L  
 CO2 21 21 - 32 mmol/L Anion gap 4 (L) 5 - 15 mmol/L Glucose 89 65 - 100 mg/dL BUN 52 (H) 6 - 20 MG/DL Creatinine 1.54 (H) 0.55 - 1.02 MG/DL  
 BUN/Creatinine ratio 34 (H) 12 - 20 GFR est AA 38 (L) >60 ml/min/1.73m2 GFR est non-AA 32 (L) >60 ml/min/1.73m2 Calcium 8.6 8.5 - 10.1 MG/DL Phosphorus 3.6 2.6 - 4.7 MG/DL Albumin 2.5 (L) 3.5 - 5.0 g/dL URIC ACID Collection Time: 03/29/19  1:12 PM  
Result Value Ref Range Uric acid 7.3 (H) 2.6 - 6.0 MG/DL  
PTH INTACT Collection Time: 03/29/19  1:15 PM  
Result Value Ref Range Calcium 8.5 8.5 - 10.1 MG/DL  
 PTH, Intact 186.8 (H) 18.4 - 88.0 pg/mL Medications Administered   
 epoetin jones (EPOGEN;PROCRIT) injection 20,000 Units Admin Date 
03/29/2019 Action Given Dose 
62125 Units Route SubCUTAneous Administered By 
Neo Huertas, RN  
  
  
  
 
 
1430 Tolerated injection well, no adverse reaction noted. D/Cd from [facility] ambulatory and in no acute distress. Future Appointments Date Time Provider Mike Feldman 4/24/2019  1:00 PM [facility]  
5/22/2019  1:00 PM JOHANAMO INFUSION NURSE 3 [facility]  
6/19/2019  1:00 PM JOHANAMO INFUSION NURSE 3 [facility]

## 2019-05-15 NOTE — PROGRESS NOTES
80 Pt arrived at [facility] via wheelchair for Q 2 week labs/Procrit, accompanied by aide from nursing home, no changes to health, but states that her son  last night and that no one at [facility] told her until this morning. Port accessed for labs with positive blood return. Patient Vitals for the past 12 hrs: 
 Temp Pulse Resp BP  
05/15/19 1411 96.9 °F (36.1 °C) (!) 44 18 141/53 Recent Results (from the past 12 hour(s)) HGB & HCT Collection Time: 05/15/19  2:12 PM  
Result Value Ref Range HGB 7.7 (L) 11.5 - 16.0 g/dL HCT 24.6 (L) 35.0 - 47.0 % RENAL FUNCTION PANEL Collection Time: 05/15/19  2:12 PM  
Result Value Ref Range Sodium 144 136 - 145 mmol/L Potassium 4.8 3.5 - 5.1 mmol/L Chloride 116 (H) 97 - 108 mmol/L  
 CO2 23 21 - 32 mmol/L Anion gap 5 5 - 15 mmol/L Glucose 105 (H) 65 - 100 mg/dL BUN 63 (H) 6 - 20 MG/DL Creatinine 1.68 (H) 0.55 - 1.02 MG/DL  
 BUN/Creatinine ratio 38 (H) 12 - 20 GFR est AA 35 (L) >60 ml/min/1.73m2 GFR est non-AA 29 (L) >60 ml/min/1.73m2 Calcium 8.4 (L) 8.5 - 10.1 MG/DL Phosphorus 3.8 2.6 - 4.7 MG/DL Albumin 2.4 (L) 3.5 - 5.0 g/dL URIC ACID Collection Time: 05/15/19  2:12 PM  
Result Value Ref Range Uric acid 7.1 (H) 2.6 - 6.0 MG/DL  
PTH INTACT Collection Time: 05/15/19  2:12 PM  
Result Value Ref Range Calcium 8.2 (L) 8.5 - 10.1 MG/DL  
 PTH, Intact 336.7 (H) 18.4 - 88.0 pg/mL Retacrit 40,000 Units given SQ in right upper arm 
 
 
1500 Tolerated injection well, no adverse reaction noted. D/Cd from [facility] via wheelchair. To return to [facility] on 19.

## 2019-05-19 NOTE — ED NOTES
RN called report to [facility], at [facility]. Report included pt status, follow-up, wound care and SBAR.

## 2019-05-19 NOTE — ED NOTES
1/4\" Steri Strips were placed on wound edges per Dr Kvng Mccord. Wound edges were well approximated, no active bleeding noted. 4x4s and Kerlix wrap were used to position dressing in place.

## 2019-05-19 NOTE — ED PROVIDER NOTES
80 y.o. female with past medical history significant for Parkinson's disease, HTN, s/p iodine thyroid ablation for hyperthyroidism, HLD, s/p NINI-BSO, CKD, anemia, and dementia with Lewy bodies presents via EMS from [facility] with chief complaint of a forehead laceration that occurred tonight s/p GLF. Pt reports associated neck pain. EMS reports that the pt is not on any anticoagulation or antiplatelet therapy at this time. Of note, the pt was admitted on 11/06/18 for a similar fall. Pt denies feeling short of breath prior to the fall. No other symptoms reported. There are no other acute medical concerns at this time. Full history, physical exam, and ROS unable to be obtained due to:  dementia. Social hx: Patient denies Tobacco use. Denies EtOH use. Denies illicit drug abuse. PCP: Sedrick Ray MD 
 
Note written by Tian Jay, as dictated by Shahid Childers MD 8:55 PM 
 
 
The history is provided by the patient. History limited by: dementia. No  was used. Past Medical History:  
Diagnosis Date  Anemia  Arthritis  Chronic kidney disease RENAL MASS  Dementia with Lewy bodies 03/01/2017  Environmental allergies  HTN (hypertension) 1/19/2012  Hyperlipemia 1/19/2012  Hypertension  Other ill-defined conditions(799.89)   
 parkinson disease  Parkinson disease ([facility] [address] 1/19/2012  S/P radioactive iodine thyroid ablation 1/19/2012  S/P NINI-BSO 1/19/2012  S/P TKR (total knee replacement) 1/19/2012  Thyroid disease   
 goiter/hyperthyroid (s/p iodine tx 1999)  Unspecified adverse effect of anesthesia CLAUSTROPHOBIA Past Surgical History:  
Procedure Laterality Date  ENDOSCOPY, COLON, DIAGNOSTIC  7/2011  
 ([place])  HX GI    
 COLONOSCOPY  
 HX ORTHOPAEDIC    
 r hip, l knee replacement 2008 [address] Family History:  
Problem Relation Age of Onset  Coronary Artery Disease Father   
     s/p MI  
 Heart Disease Father  Elevated Lipids Father  Cancer Brother   
     hodgkins  Anesth Problems Neg Hx Social History Socioeconomic History  Marital status:  Spouse name: Not on file  Number of children: Not on file  Years of education: Not on file  Highest education level: Not on file Occupational History  Not on file Social Needs  Financial resource strain: Not on file  Food insecurity:  
  Worry: Not on file Inability: Not on file  Transportation needs:  
  Medical: Not on file Non-medical: Not on file Tobacco Use  Smoking status: Never Smoker  Smokeless tobacco: Never Used Substance and Sexual Activity  Alcohol use: No  
  Alcohol/week: 0.0 oz  Drug use: No  
 Sexual activity: Never Lifestyle  Physical activity:  
  Days per week: Not on file Minutes per session: Not on file  Stress: Not on file Relationships  Social connections:  
  Talks on phone: Not on file Gets together: Not on file Attends [employer] service: Not on file Active member of club or organization: Not on file Attends meetings of clubs or organizations: Not on file Relationship status: Not on file  Intimate partner violence:  
  Fear of current or ex partner: Not on file Emotionally abused: Not on file Physically abused: Not on file Forced sexual activity: Not on file Other Topics Concern [address] Service Not Asked  Blood Transfusions Not Asked  Caffeine Concern Not Asked  Occupational Exposure Not Asked Doreen Pinna Hazards Not Asked  Sleep Concern Not Asked  Stress Concern Not Asked  Weight Concern Not Asked  Special Diet Not Asked  Back Care Not Asked  Exercise Not Asked  Bike Helmet Not Asked  Seat Belt Not Asked  Self-Exams Not Asked Social History Narrative  Not on file ALLERGIES: Bactroban [mupirocin calcium]; Betadine [povidone-iodine]; Comtan [entacapone]; Erythromycin; Penicillins; and Sulfa (sulfonamide antibiotics) Review of Systems Unable to perform ROS: Dementia Respiratory: Negative for shortness of breath. Musculoskeletal: Positive for neck pain. Skin: Positive for wound (vertical forehead laceration). Neurological: Negative for syncope (no LOC). Vitals:  
 05/18/19 2230 05/18/19 2245 05/18/19 2300 05/18/19 2330 BP: 181/59  178/63 172/57 Pulse: 76  78 Resp:      
Temp:      
SpO2: 100%  100% 100% Weight:  65.8 kg (145 lb) Height:  5' 5\" (1.651 m) Physical Exam  
Constitutional: She is oriented to person, place, and time. She appears well-developed and well-nourished. HENT:  
Head: Normocephalic. Pt has a well healed and well approximated stellate scar over her forehead. No active bleeding. Swelling present over right nose. Eyes: Pupils are equal, round, and reactive to light. Neck: Normal range of motion. Neck supple. Cardiovascular: Normal rate and regular rhythm. Pulmonary/Chest: Effort normal and breath sounds normal.  
Abdominal: Soft. She exhibits no distension. There is no tenderness. Musculoskeletal:  
     Cervical back: She exhibits no tenderness and no bony tenderness. Pt reports pain over C6-C7, but without tenderness. Neurological: She is alert and oriented to person, place, and time. Skin: Skin is warm and dry. Capillary refill takes less than 2 seconds. Psychiatric: She has a normal mood and affect. Her behavior is normal.  
Nursing note and vitals reviewed. Note written by Pavithra Correa, as dictated by Rufina Guan MD 8:55 PM  
 
MDM Number of Diagnoses or Management Options Closed head injury, initial encounter:  
Fall, initial encounter:  
Laceration of right periocular area without foreign body, initial encounter: Diagnosis management comments: The patient presented after a fall. The patient is now resting comfortably and feels better, is alert and in no distress. The patient has a normal mental status and is neurologically intact. The history, exam, diagnostic testing (if any), and current condition do not demonstrate signs of clinically significant intra-cranial, intra-thoracic, intra-abdominal, or musculoskeletal trauma. The vital signs have been stable. The patient's condition is stable and appropriate for discharge. The patient will pursue further outpatient evaluation with the primary care physician or other designated or consulting physician as indicated in the discharge instructions. Procedures

## 2019-05-19 NOTE — ED TRIAGE NOTES
Pt arrives via EMS from [facility] dementia unit. Pt states she was getting up to brush her teeth, \"something in my body was stinging and I fell\". Pt denies LOC, n/v,or pain. Pt arrives with laceration on forehead and brdige of nose, AOx1 in no apparent distress.

## 2019-07-03 NOTE — PROGRESS NOTES
Outpatient Infusion Center Short Visit Progress Note [id number] Pt admit to [facility] for labs and possible procrit via wheelchair in stable condition accompanied by home care aide. Assessment completed. No new concerns voiced. Visit Vitals /53 (BP 1 Location: Left arm, BP Patient Position: At rest) Pulse (!) 45 Temp 97 °F (36.1 °C) Resp 18 SpO2 95% Breastfeeding? No  
 
 
Labs drawn peripherally and sent for processing. Medications: 
Procrit SQ left arm 
 
1530 Pt tolerated treatment well. D/c home via wheelchair in no distress. Pt aware of next appointment scheduled for 7/17/19 Recent Results (from the past 12 hour(s)) RENAL FUNCTION PANEL Collection Time: 07/03/19  2:35 PM  
Result Value Ref Range Sodium 143 136 - 145 mmol/L Potassium 4.7 3.5 - 5.1 mmol/L Chloride 114 (H) 97 - 108 mmol/L  
 CO2 23 21 - 32 mmol/L Anion gap 6 5 - 15 mmol/L Glucose 95 65 - 100 mg/dL BUN 51 (H) 6 - 20 MG/DL Creatinine 1.65 (H) 0.55 - 1.02 MG/DL  
 BUN/Creatinine ratio 31 (H) 12 - 20 GFR est AA 35 (L) >60 ml/min/1.73m2 GFR est non-AA 29 (L) >60 ml/min/1.73m2 Calcium 8.3 (L) 8.5 - 10.1 MG/DL Phosphorus 3.4 2.6 - 4.7 MG/DL Albumin 2.3 (L) 3.5 - 5.0 g/dL PTH INTACT Collection Time: 07/03/19  2:35 PM  
Result Value Ref Range Calcium 8.3 (L) 8.5 - 10.1 MG/DL  
 PTH, Intact PENDING pg/mL HGB & HCT Collection Time: 07/03/19  2:35 PM  
Result Value Ref Range HGB 8.3 (L) 11.5 - 16.0 g/dL HCT 27.5 (L) 35.0 - 47.0 %

## 2020-01-01 ENCOUNTER — APPOINTMENT (OUTPATIENT)
Dept: GENERAL RADIOLOGY | Age: 85
DRG: 640 | End: 2020-01-01
Attending: HOSPITALIST
Payer: MEDICARE

## 2020-01-01 ENCOUNTER — APPOINTMENT (OUTPATIENT)
Dept: GENERAL RADIOLOGY | Age: 85
DRG: 640 | End: 2020-01-01
Attending: NURSE PRACTITIONER
Payer: MEDICARE

## 2020-01-01 ENCOUNTER — HOSPITAL ENCOUNTER (INPATIENT)
Age: 85
LOS: 7 days | DRG: 640 | End: 2020-01-26
Attending: EMERGENCY MEDICINE | Admitting: HOSPITALIST
Payer: MEDICARE

## 2020-01-01 ENCOUNTER — PATIENT OUTREACH (OUTPATIENT)
Dept: CASE MANAGEMENT | Age: 85
End: 2020-01-01

## 2020-01-01 ENCOUNTER — APPOINTMENT (OUTPATIENT)
Dept: NON INVASIVE DIAGNOSTICS | Age: 85
DRG: 640 | End: 2020-01-01
Attending: HOSPITALIST
Payer: MEDICARE

## 2020-01-01 ENCOUNTER — APPOINTMENT (OUTPATIENT)
Dept: MRI IMAGING | Age: 85
DRG: 640 | End: 2020-01-01
Attending: INTERNAL MEDICINE
Payer: MEDICARE

## 2020-01-01 ENCOUNTER — APPOINTMENT (OUTPATIENT)
Dept: GENERAL RADIOLOGY | Age: 85
DRG: 194 | End: 2020-01-01
Attending: INTERNAL MEDICINE
Payer: MEDICARE

## 2020-01-01 ENCOUNTER — APPOINTMENT (OUTPATIENT)
Dept: GENERAL RADIOLOGY | Age: 85
DRG: 640 | End: 2020-01-01
Attending: EMERGENCY MEDICINE
Payer: MEDICARE

## 2020-01-01 ENCOUNTER — APPOINTMENT (OUTPATIENT)
Dept: CT IMAGING | Age: 85
DRG: 640 | End: 2020-01-01
Attending: EMERGENCY MEDICINE
Payer: MEDICARE

## 2020-01-01 ENCOUNTER — APPOINTMENT (OUTPATIENT)
Dept: NON INVASIVE DIAGNOSTICS | Age: 85
DRG: 194 | End: 2020-01-01
Attending: INTERNAL MEDICINE
Payer: MEDICARE

## 2020-01-01 VITALS
HEIGHT: 65 IN | WEIGHT: 142.2 LBS | RESPIRATION RATE: 28 BRPM | TEMPERATURE: 98.1 F | DIASTOLIC BLOOD PRESSURE: 54 MMHG | OXYGEN SATURATION: 96 % | HEART RATE: 76 BPM | BODY MASS INDEX: 23.69 KG/M2 | SYSTOLIC BLOOD PRESSURE: 173 MMHG

## 2020-01-01 VITALS
WEIGHT: 138.9 LBS | BODY MASS INDEX: 23.14 KG/M2 | OXYGEN SATURATION: 98 % | HEIGHT: 65 IN | DIASTOLIC BLOOD PRESSURE: 68 MMHG | SYSTOLIC BLOOD PRESSURE: 131 MMHG | HEART RATE: 61 BPM | TEMPERATURE: 97.6 F | RESPIRATION RATE: 16 BRPM

## 2020-01-01 DIAGNOSIS — G20 PARKINSON DISEASE (HCC): ICD-10-CM

## 2020-01-01 DIAGNOSIS — R40.3 PERSISTENT VEGETATIVE STATE (HCC): Primary | ICD-10-CM

## 2020-01-01 DIAGNOSIS — G93.40 ENCEPHALOPATHY: ICD-10-CM

## 2020-01-01 DIAGNOSIS — T68.XXXA HYPOTHERMIA, INITIAL ENCOUNTER: ICD-10-CM

## 2020-01-01 DIAGNOSIS — E16.2 HYPOGLYCEMIA: ICD-10-CM

## 2020-01-01 DIAGNOSIS — I63.89 ACUTE BILAT WATERSHED INFARCTION (HCC): ICD-10-CM

## 2020-01-01 PROBLEM — J18.9 PNEUMONIA: Status: ACTIVE | Noted: 2020-01-01

## 2020-01-01 LAB
ABO + RH BLD: NORMAL
ALBUMIN SERPL-MCNC: 2.1 G/DL (ref 3.5–5)
ALBUMIN SERPL-MCNC: 2.1 G/DL (ref 3.5–5)
ALBUMIN/GLOB SERPL: 0.6 {RATIO} (ref 1.1–2.2)
ALP SERPL-CCNC: 77 U/L (ref 45–117)
ALT SERPL-CCNC: 6 U/L (ref 12–78)
AMMONIA PLAS-SCNC: 14 UMOL/L
ANION GAP SERPL CALC-SCNC: 10 MMOL/L (ref 5–15)
ANION GAP SERPL CALC-SCNC: 3 MMOL/L (ref 5–15)
ANION GAP SERPL CALC-SCNC: 4 MMOL/L (ref 5–15)
ANION GAP SERPL CALC-SCNC: 5 MMOL/L (ref 5–15)
ANION GAP SERPL CALC-SCNC: 6 MMOL/L (ref 5–15)
ANION GAP SERPL CALC-SCNC: 6 MMOL/L (ref 5–15)
APPEARANCE UR: ABNORMAL
ARTERIAL PATENCY WRIST A: ABNORMAL
ARTERIAL PATENCY WRIST A: ABNORMAL
ARTERIAL PATENCY WRIST A: YES
AST SERPL-CCNC: 27 U/L (ref 15–37)
ATRIAL RATE: 70 BPM
AV VELOCITY RATIO: 0.82
BACTERIA SPEC CULT: ABNORMAL
BACTERIA SPEC CULT: NORMAL
BACTERIA URNS QL MICRO: ABNORMAL /HPF
BASE DEFICIT BLD-SCNC: 3 MMOL/L
BASE EXCESS BLD CALC-SCNC: 0 MMOL/L
BASE EXCESS BLDV CALC-SCNC: 2 MMOL/L
BASOPHILS # BLD: 0 K/UL (ref 0–0.1)
BASOPHILS NFR BLD: 0 % (ref 0–1)
BASOPHILS NFR BLD: 1 % (ref 0–1)
BDY SITE: ABNORMAL
BILIRUB SERPL-MCNC: 0.3 MG/DL (ref 0.2–1)
BILIRUB UR QL: NEGATIVE
BLD PROD TYP BPU: NORMAL
BLOOD GROUP ANTIBODIES SERPL: NORMAL
BNP SERPL-MCNC: 6788 PG/ML (ref 0–450)
BNP SERPL-MCNC: ABNORMAL PG/ML
BODY TEMPERATURE: 93.6
BPU ID: NORMAL
BUN SERPL-MCNC: 36 MG/DL (ref 6–20)
BUN SERPL-MCNC: 36 MG/DL (ref 6–20)
BUN SERPL-MCNC: 37 MG/DL (ref 6–20)
BUN SERPL-MCNC: 37 MG/DL (ref 6–20)
BUN SERPL-MCNC: 38 MG/DL (ref 6–20)
BUN SERPL-MCNC: 40 MG/DL (ref 6–20)
BUN SERPL-MCNC: 42 MG/DL (ref 6–20)
BUN SERPL-MCNC: 47 MG/DL (ref 6–20)
BUN SERPL-MCNC: 49 MG/DL (ref 6–20)
BUN SERPL-MCNC: 50 MG/DL (ref 6–20)
BUN SERPL-MCNC: 53 MG/DL (ref 6–20)
BUN SERPL-MCNC: 53 MG/DL (ref 6–20)
BUN SERPL-MCNC: 56 MG/DL (ref 6–20)
BUN SERPL-MCNC: 58 MG/DL (ref 6–20)
BUN/CREAT SERPL: 16 (ref 12–20)
BUN/CREAT SERPL: 16 (ref 12–20)
BUN/CREAT SERPL: 17 (ref 12–20)
BUN/CREAT SERPL: 18 (ref 12–20)
BUN/CREAT SERPL: 21 (ref 12–20)
BUN/CREAT SERPL: 22 (ref 12–20)
BUN/CREAT SERPL: 25 (ref 12–20)
BUN/CREAT SERPL: 26 (ref 12–20)
BUN/CREAT SERPL: 27 (ref 12–20)
CA-I BLD-SCNC: 1.19 MMOL/L (ref 1.12–1.32)
CA-I BLD-SCNC: 1.19 MMOL/L (ref 1.12–1.32)
CALCIUM SERPL-MCNC: 7.5 MG/DL (ref 8.5–10.1)
CALCIUM SERPL-MCNC: 7.9 MG/DL (ref 8.5–10.1)
CALCIUM SERPL-MCNC: 7.9 MG/DL (ref 8.5–10.1)
CALCIUM SERPL-MCNC: 8 MG/DL (ref 8.5–10.1)
CALCIUM SERPL-MCNC: 8.1 MG/DL (ref 8.5–10.1)
CALCIUM SERPL-MCNC: 8.2 MG/DL (ref 8.5–10.1)
CALCIUM SERPL-MCNC: 8.4 MG/DL (ref 8.5–10.1)
CALCIUM SERPL-MCNC: 8.6 MG/DL (ref 8.5–10.1)
CALCULATED P AXIS, ECG09: 89 DEGREES
CALCULATED R AXIS, ECG10: 70 DEGREES
CALCULATED T AXIS, ECG11: -5 DEGREES
CC UR VC: ABNORMAL
CHLORIDE SERPL-SCNC: 108 MMOL/L (ref 97–108)
CHLORIDE SERPL-SCNC: 108 MMOL/L (ref 97–108)
CHLORIDE SERPL-SCNC: 111 MMOL/L (ref 97–108)
CHLORIDE SERPL-SCNC: 112 MMOL/L (ref 97–108)
CHLORIDE SERPL-SCNC: 112 MMOL/L (ref 97–108)
CHLORIDE SERPL-SCNC: 114 MMOL/L (ref 97–108)
CHLORIDE SERPL-SCNC: 114 MMOL/L (ref 97–108)
CHLORIDE SERPL-SCNC: 115 MMOL/L (ref 97–108)
CHLORIDE SERPL-SCNC: 115 MMOL/L (ref 97–108)
CHLORIDE SERPL-SCNC: 116 MMOL/L (ref 97–108)
CHLORIDE SERPL-SCNC: 118 MMOL/L (ref 97–108)
CHLORIDE SERPL-SCNC: 118 MMOL/L (ref 97–108)
CHLORIDE SERPL-SCNC: 120 MMOL/L (ref 97–108)
CHLORIDE SERPL-SCNC: 121 MMOL/L (ref 97–108)
CK SERPL-CCNC: 237 U/L (ref 26–192)
CO2 SERPL-SCNC: 21 MMOL/L (ref 21–32)
CO2 SERPL-SCNC: 22 MMOL/L (ref 21–32)
CO2 SERPL-SCNC: 23 MMOL/L (ref 21–32)
CO2 SERPL-SCNC: 24 MMOL/L (ref 21–32)
CO2 SERPL-SCNC: 25 MMOL/L (ref 21–32)
CO2 SERPL-SCNC: 26 MMOL/L (ref 21–32)
CO2 SERPL-SCNC: 26 MMOL/L (ref 21–32)
COLOR UR: ABNORMAL
COMMENT, HOLDF: NORMAL
CREAT SERPL-MCNC: 1.92 MG/DL (ref 0.55–1.02)
CREAT SERPL-MCNC: 1.99 MG/DL (ref 0.55–1.02)
CREAT SERPL-MCNC: 2.02 MG/DL (ref 0.55–1.02)
CREAT SERPL-MCNC: 2.02 MG/DL (ref 0.55–1.02)
CREAT SERPL-MCNC: 2.07 MG/DL (ref 0.55–1.02)
CREAT SERPL-MCNC: 2.07 MG/DL (ref 0.55–1.02)
CREAT SERPL-MCNC: 2.11 MG/DL (ref 0.55–1.02)
CREAT SERPL-MCNC: 2.11 MG/DL (ref 0.55–1.02)
CREAT SERPL-MCNC: 2.14 MG/DL (ref 0.55–1.02)
CREAT SERPL-MCNC: 2.15 MG/DL (ref 0.55–1.02)
CREAT SERPL-MCNC: 2.19 MG/DL (ref 0.55–1.02)
CREAT SERPL-MCNC: 2.33 MG/DL (ref 0.55–1.02)
CREAT SERPL-MCNC: 2.35 MG/DL (ref 0.55–1.02)
CREAT SERPL-MCNC: 2.43 MG/DL (ref 0.55–1.02)
CROSSMATCH RESULT,%XM: NORMAL
DIAGNOSIS, 93000: NORMAL
DIFFERENTIAL METHOD BLD: ABNORMAL
ECHO AO ROOT DIAM: 2.98 CM
ECHO AO ROOT DIAM: 3.28 CM
ECHO AV AREA PEAK VELOCITY: 2.5 CM2
ECHO AV PEAK GRADIENT: 4.3 MMHG
ECHO AV PEAK VELOCITY: 103.42 CM/S
ECHO LA AREA 4C: 24.2 CM2
ECHO LA MAJOR AXIS: 3.79 CM
ECHO LA MAJOR AXIS: 4.47 CM
ECHO LA TO AORTIC ROOT RATIO: 1.16
ECHO LA TO AORTIC ROOT RATIO: 1.5
ECHO LA VOL 2C: 90.64 ML (ref 22–52)
ECHO LA VOL 4C: 72.84 ML (ref 22–52)
ECHO LA VOL BP: 91.55 ML (ref 22–52)
ECHO LA VOL/BSA BIPLANE: 54.27 ML/M2 (ref 16–28)
ECHO LA VOLUME INDEX A2C: 53.73 ML/M2 (ref 16–28)
ECHO LA VOLUME INDEX A4C: 43.18 ML/M2 (ref 16–28)
ECHO LV E' LATERAL VELOCITY: 5.91 CM/S
ECHO LV E' SEPTAL VELOCITY: 3.71 CM/S
ECHO LV INTERNAL DIMENSION DIASTOLIC: 5.09 CM (ref 3.9–5.3)
ECHO LV INTERNAL DIMENSION DIASTOLIC: 5.44 CM (ref 3.9–5.3)
ECHO LV INTERNAL DIMENSION SYSTOLIC: 4.27 CM
ECHO LV INTERNAL DIMENSION SYSTOLIC: 4.46 CM
ECHO LV IVSD: 1.09 CM (ref 0.6–0.9)
ECHO LV IVSD: 1.23 CM (ref 0.6–0.9)
ECHO LV MASS 2D: 280.4 G (ref 67–162)
ECHO LV MASS 2D: 298.2 G (ref 67–162)
ECHO LV MASS INDEX 2D: 172.7 G/M2 (ref 43–95)
ECHO LV MASS INDEX 2D: 176.8 G/M2 (ref 43–95)
ECHO LV POSTERIOR WALL DIASTOLIC: 1.14 CM (ref 0.6–0.9)
ECHO LV POSTERIOR WALL DIASTOLIC: 1.2 CM (ref 0.6–0.9)
ECHO LVOT DIAM: 1.98 CM
ECHO LVOT PEAK GRADIENT: 2.9 MMHG
ECHO LVOT PEAK VELOCITY: 84.53 CM/S
ECHO MV A VELOCITY: 52.75 CM/S
ECHO MV AREA PHT: 2 CM2
ECHO MV E DECELERATION TIME (DT): 372.8 MS
ECHO MV E VELOCITY: 50.07 CM/S
ECHO MV E/A RATIO: 0.95
ECHO MV E/E' LATERAL: 8.47
ECHO MV E/E' RATIO (AVERAGED): 10.98
ECHO MV E/E' SEPTAL: 13.5
ECHO MV PRESSURE HALF TIME (PHT): 108.1 MS
ECHO PULMONARY ARTERY SYSTOLIC PRESSURE (PASP): 65 MMHG
ECHO PV MAX VELOCITY: 89.29 CM/S
ECHO PV PEAK GRADIENT: 3.2 MMHG
ECHO RV INTERNAL DIMENSION: 2.9 CM
ECHO RV INTERNAL DIMENSION: 4.39 CM
ECHO RV TAPSE: 2.36 CM (ref 1.5–2)
ECHO TV REGURGITANT MAX VELOCITY: 404.91 CM/S
ECHO TV REGURGITANT PEAK GRADIENT: 65.6 MMHG
EOSINOPHIL # BLD: 0 K/UL (ref 0–0.4)
EOSINOPHIL # BLD: 0.1 K/UL (ref 0–0.4)
EOSINOPHIL NFR BLD: 0 % (ref 0–7)
EOSINOPHIL NFR BLD: 1 % (ref 0–7)
EOSINOPHIL NFR BLD: 1 % (ref 0–7)
EOSINOPHIL NFR BLD: 2 % (ref 0–7)
EPITH CASTS URNS QL MICRO: ABNORMAL /LPF
ERYTHROCYTE [DISTWIDTH] IN BLOOD BY AUTOMATED COUNT: 14 % (ref 11.5–14.5)
ERYTHROCYTE [DISTWIDTH] IN BLOOD BY AUTOMATED COUNT: 14.1 % (ref 11.5–14.5)
ERYTHROCYTE [DISTWIDTH] IN BLOOD BY AUTOMATED COUNT: 14.4 % (ref 11.5–14.5)
ERYTHROCYTE [DISTWIDTH] IN BLOOD BY AUTOMATED COUNT: 15 % (ref 11.5–14.5)
ERYTHROCYTE [DISTWIDTH] IN BLOOD BY AUTOMATED COUNT: 15.1 % (ref 11.5–14.5)
ERYTHROCYTE [DISTWIDTH] IN BLOOD BY AUTOMATED COUNT: 15.1 % (ref 11.5–14.5)
ERYTHROCYTE [DISTWIDTH] IN BLOOD BY AUTOMATED COUNT: 15.2 % (ref 11.5–14.5)
ERYTHROCYTE [DISTWIDTH] IN BLOOD BY AUTOMATED COUNT: 15.2 % (ref 11.5–14.5)
ERYTHROCYTE [DISTWIDTH] IN BLOOD BY AUTOMATED COUNT: 15.3 % (ref 11.5–14.5)
ERYTHROCYTE [DISTWIDTH] IN BLOOD BY AUTOMATED COUNT: 15.5 % (ref 11.5–14.5)
ERYTHROCYTE [DISTWIDTH] IN BLOOD BY AUTOMATED COUNT: 15.7 % (ref 11.5–14.5)
ERYTHROCYTE [DISTWIDTH] IN BLOOD BY AUTOMATED COUNT: 15.8 % (ref 11.5–14.5)
ERYTHROCYTE [DISTWIDTH] IN BLOOD BY AUTOMATED COUNT: 16.4 % (ref 11.5–14.5)
ERYTHROCYTE [DISTWIDTH] IN BLOOD BY AUTOMATED COUNT: 16.5 % (ref 11.5–14.5)
FERRITIN SERPL-MCNC: 489 NG/ML (ref 8–252)
FLUAV AG NPH QL IA: NEGATIVE
FLUBV AG NOSE QL IA: NEGATIVE
GAS FLOW.O2 O2 DELIVERY SYS: ABNORMAL L/MIN
GAS FLOW.O2 SETTING OXYMISER: 12 BPM
GAS FLOW.O2 SETTING OXYMISER: 14 BPM
GAS FLOW.O2 SETTING OXYMISER: 15 BPM
GLOBULIN SER CALC-MCNC: 3.7 G/DL (ref 2–4)
GLUCOSE BLD STRIP.AUTO-MCNC: 101 MG/DL (ref 65–100)
GLUCOSE BLD STRIP.AUTO-MCNC: 101 MG/DL (ref 65–100)
GLUCOSE BLD STRIP.AUTO-MCNC: 102 MG/DL (ref 65–100)
GLUCOSE BLD STRIP.AUTO-MCNC: 102 MG/DL (ref 65–100)
GLUCOSE BLD STRIP.AUTO-MCNC: 104 MG/DL (ref 65–100)
GLUCOSE BLD STRIP.AUTO-MCNC: 106 MG/DL (ref 65–100)
GLUCOSE BLD STRIP.AUTO-MCNC: 107 MG/DL (ref 65–100)
GLUCOSE BLD STRIP.AUTO-MCNC: 109 MG/DL (ref 65–100)
GLUCOSE BLD STRIP.AUTO-MCNC: 109 MG/DL (ref 65–100)
GLUCOSE BLD STRIP.AUTO-MCNC: 110 MG/DL (ref 65–100)
GLUCOSE BLD STRIP.AUTO-MCNC: 111 MG/DL (ref 65–100)
GLUCOSE BLD STRIP.AUTO-MCNC: 111 MG/DL (ref 65–100)
GLUCOSE BLD STRIP.AUTO-MCNC: 112 MG/DL (ref 65–100)
GLUCOSE BLD STRIP.AUTO-MCNC: 113 MG/DL (ref 65–100)
GLUCOSE BLD STRIP.AUTO-MCNC: 114 MG/DL (ref 65–100)
GLUCOSE BLD STRIP.AUTO-MCNC: 115 MG/DL (ref 65–100)
GLUCOSE BLD STRIP.AUTO-MCNC: 117 MG/DL (ref 65–100)
GLUCOSE BLD STRIP.AUTO-MCNC: 118 MG/DL (ref 65–100)
GLUCOSE BLD STRIP.AUTO-MCNC: 119 MG/DL (ref 65–100)
GLUCOSE BLD STRIP.AUTO-MCNC: 120 MG/DL (ref 65–100)
GLUCOSE BLD STRIP.AUTO-MCNC: 121 MG/DL (ref 65–100)
GLUCOSE BLD STRIP.AUTO-MCNC: 123 MG/DL (ref 65–100)
GLUCOSE BLD STRIP.AUTO-MCNC: 125 MG/DL (ref 65–100)
GLUCOSE BLD STRIP.AUTO-MCNC: 127 MG/DL (ref 65–100)
GLUCOSE BLD STRIP.AUTO-MCNC: 131 MG/DL (ref 65–100)
GLUCOSE BLD STRIP.AUTO-MCNC: 132 MG/DL (ref 65–100)
GLUCOSE BLD STRIP.AUTO-MCNC: 138 MG/DL (ref 65–100)
GLUCOSE BLD STRIP.AUTO-MCNC: 140 MG/DL (ref 65–100)
GLUCOSE BLD STRIP.AUTO-MCNC: 141 MG/DL (ref 65–100)
GLUCOSE BLD STRIP.AUTO-MCNC: 142 MG/DL (ref 65–100)
GLUCOSE BLD STRIP.AUTO-MCNC: 149 MG/DL (ref 65–100)
GLUCOSE BLD STRIP.AUTO-MCNC: 157 MG/DL (ref 65–100)
GLUCOSE BLD STRIP.AUTO-MCNC: 16 MG/DL (ref 65–100)
GLUCOSE BLD STRIP.AUTO-MCNC: 161 MG/DL (ref 65–100)
GLUCOSE BLD STRIP.AUTO-MCNC: 163 MG/DL (ref 65–100)
GLUCOSE BLD STRIP.AUTO-MCNC: 165 MG/DL (ref 65–100)
GLUCOSE BLD STRIP.AUTO-MCNC: 184 MG/DL (ref 65–100)
GLUCOSE BLD STRIP.AUTO-MCNC: 185 MG/DL (ref 65–100)
GLUCOSE BLD STRIP.AUTO-MCNC: 194 MG/DL (ref 65–100)
GLUCOSE BLD STRIP.AUTO-MCNC: 293 MG/DL (ref 65–100)
GLUCOSE BLD STRIP.AUTO-MCNC: 302 MG/DL (ref 65–100)
GLUCOSE BLD STRIP.AUTO-MCNC: 58 MG/DL (ref 65–100)
GLUCOSE BLD STRIP.AUTO-MCNC: 63 MG/DL (ref 65–100)
GLUCOSE BLD STRIP.AUTO-MCNC: 64 MG/DL (ref 65–100)
GLUCOSE BLD STRIP.AUTO-MCNC: 65 MG/DL (ref 65–100)
GLUCOSE BLD STRIP.AUTO-MCNC: 70 MG/DL (ref 65–100)
GLUCOSE BLD STRIP.AUTO-MCNC: 70 MG/DL (ref 65–100)
GLUCOSE BLD STRIP.AUTO-MCNC: 71 MG/DL (ref 65–100)
GLUCOSE BLD STRIP.AUTO-MCNC: 72 MG/DL (ref 65–100)
GLUCOSE BLD STRIP.AUTO-MCNC: 74 MG/DL (ref 65–100)
GLUCOSE BLD STRIP.AUTO-MCNC: 75 MG/DL (ref 65–100)
GLUCOSE BLD STRIP.AUTO-MCNC: 75 MG/DL (ref 65–100)
GLUCOSE BLD STRIP.AUTO-MCNC: 76 MG/DL (ref 65–100)
GLUCOSE BLD STRIP.AUTO-MCNC: 77 MG/DL (ref 65–100)
GLUCOSE BLD STRIP.AUTO-MCNC: 78 MG/DL (ref 65–100)
GLUCOSE BLD STRIP.AUTO-MCNC: 78 MG/DL (ref 65–100)
GLUCOSE BLD STRIP.AUTO-MCNC: 79 MG/DL (ref 65–100)
GLUCOSE BLD STRIP.AUTO-MCNC: 80 MG/DL (ref 65–100)
GLUCOSE BLD STRIP.AUTO-MCNC: 83 MG/DL (ref 65–100)
GLUCOSE BLD STRIP.AUTO-MCNC: 84 MG/DL (ref 65–100)
GLUCOSE BLD STRIP.AUTO-MCNC: 85 MG/DL (ref 65–100)
GLUCOSE BLD STRIP.AUTO-MCNC: 86 MG/DL (ref 65–100)
GLUCOSE BLD STRIP.AUTO-MCNC: 88 MG/DL (ref 65–100)
GLUCOSE BLD STRIP.AUTO-MCNC: 89 MG/DL (ref 65–100)
GLUCOSE BLD STRIP.AUTO-MCNC: 90 MG/DL (ref 65–100)
GLUCOSE BLD STRIP.AUTO-MCNC: 90 MG/DL (ref 65–100)
GLUCOSE BLD STRIP.AUTO-MCNC: 91 MG/DL (ref 65–100)
GLUCOSE BLD STRIP.AUTO-MCNC: 92 MG/DL (ref 65–100)
GLUCOSE BLD STRIP.AUTO-MCNC: 93 MG/DL (ref 65–100)
GLUCOSE BLD STRIP.AUTO-MCNC: 93 MG/DL (ref 65–100)
GLUCOSE BLD STRIP.AUTO-MCNC: 95 MG/DL (ref 65–100)
GLUCOSE BLD STRIP.AUTO-MCNC: 95 MG/DL (ref 65–100)
GLUCOSE BLD STRIP.AUTO-MCNC: 96 MG/DL (ref 65–100)
GLUCOSE BLD STRIP.AUTO-MCNC: 97 MG/DL (ref 65–100)
GLUCOSE BLD STRIP.AUTO-MCNC: 98 MG/DL (ref 65–100)
GLUCOSE SERPL-MCNC: 100 MG/DL (ref 65–100)
GLUCOSE SERPL-MCNC: 111 MG/DL (ref 65–100)
GLUCOSE SERPL-MCNC: 119 MG/DL (ref 65–100)
GLUCOSE SERPL-MCNC: 130 MG/DL (ref 65–100)
GLUCOSE SERPL-MCNC: 15 MG/DL (ref 65–100)
GLUCOSE SERPL-MCNC: 170 MG/DL (ref 65–100)
GLUCOSE SERPL-MCNC: 61 MG/DL (ref 65–100)
GLUCOSE SERPL-MCNC: 67 MG/DL (ref 65–100)
GLUCOSE SERPL-MCNC: 76 MG/DL (ref 65–100)
GLUCOSE SERPL-MCNC: 77 MG/DL (ref 65–100)
GLUCOSE SERPL-MCNC: 79 MG/DL (ref 65–100)
GLUCOSE SERPL-MCNC: 83 MG/DL (ref 65–100)
GLUCOSE SERPL-MCNC: 83 MG/DL (ref 65–100)
GLUCOSE SERPL-MCNC: 99 MG/DL (ref 65–100)
GLUCOSE UR STRIP.AUTO-MCNC: NEGATIVE MG/DL
GRAM STN SPEC: NORMAL
GRAN CASTS URNS QL MICRO: ABNORMAL /LPF
HCO3 BLD-SCNC: 22.4 MMOL/L (ref 22–26)
HCO3 BLD-SCNC: 23.5 MMOL/L (ref 22–26)
HCO3 BLDV-SCNC: 26.8 MMOL/L (ref 23–28)
HCT VFR BLD AUTO: 21.7 % (ref 35–47)
HCT VFR BLD AUTO: 23 % (ref 35–47)
HCT VFR BLD AUTO: 23.4 % (ref 35–47)
HCT VFR BLD AUTO: 25.4 % (ref 35–47)
HCT VFR BLD AUTO: 25.5 % (ref 35–47)
HCT VFR BLD AUTO: 25.8 % (ref 35–47)
HCT VFR BLD AUTO: 25.9 % (ref 35–47)
HCT VFR BLD AUTO: 26.4 % (ref 35–47)
HCT VFR BLD AUTO: 27.2 % (ref 35–47)
HCT VFR BLD AUTO: 27.2 % (ref 35–47)
HCT VFR BLD AUTO: 27.6 % (ref 35–47)
HCT VFR BLD AUTO: 27.8 % (ref 35–47)
HCT VFR BLD AUTO: 28.9 % (ref 35–47)
HCT VFR BLD AUTO: 29.4 % (ref 35–47)
HCT VFR BLD AUTO: 30.5 % (ref 35–47)
HEMOCCULT STL QL: NEGATIVE
HGB BLD-MCNC: 6.4 G/DL (ref 11.5–16)
HGB BLD-MCNC: 6.9 G/DL (ref 11.5–16)
HGB BLD-MCNC: 7 G/DL (ref 11.5–16)
HGB BLD-MCNC: 7.6 G/DL (ref 11.5–16)
HGB BLD-MCNC: 7.8 G/DL (ref 11.5–16)
HGB BLD-MCNC: 7.9 G/DL (ref 11.5–16)
HGB BLD-MCNC: 8.1 G/DL (ref 11.5–16)
HGB BLD-MCNC: 8.3 G/DL (ref 11.5–16)
HGB BLD-MCNC: 8.4 G/DL (ref 11.5–16)
HGB BLD-MCNC: 8.5 G/DL (ref 11.5–16)
HGB BLD-MCNC: 9 G/DL (ref 11.5–16)
HGB BLD-MCNC: 9.2 G/DL (ref 11.5–16)
HGB BLD-MCNC: 9.3 G/DL (ref 11.5–16)
HGB UR QL STRIP: NEGATIVE
IMM GRANULOCYTES # BLD AUTO: 0 K/UL (ref 0–0.04)
IMM GRANULOCYTES # BLD AUTO: 0.1 K/UL (ref 0–0.04)
IMM GRANULOCYTES NFR BLD AUTO: 1 % (ref 0–0.5)
INR PPP: 1.3 (ref 0.9–1.1)
IRON SATN MFR SERPL: 27 % (ref 20–50)
IRON SERPL-MCNC: 31 UG/DL (ref 35–150)
KETONES UR QL STRIP.AUTO: NEGATIVE MG/DL
LACTATE SERPL-SCNC: 0.5 MMOL/L (ref 0.4–2)
LEUKOCYTE ESTERASE UR QL STRIP.AUTO: NEGATIVE
LIPASE SERPL-CCNC: 88 U/L (ref 73–393)
LVFS 2D: 16.13 %
LVFS 2D: 18 %
LYMPHOCYTES # BLD: 0.6 K/UL (ref 0.8–3.5)
LYMPHOCYTES # BLD: 0.8 K/UL (ref 0.8–3.5)
LYMPHOCYTES # BLD: 0.9 K/UL (ref 0.8–3.5)
LYMPHOCYTES # BLD: 1 K/UL (ref 0.8–3.5)
LYMPHOCYTES NFR BLD: 11 % (ref 12–49)
LYMPHOCYTES NFR BLD: 12 % (ref 12–49)
LYMPHOCYTES NFR BLD: 14 % (ref 12–49)
LYMPHOCYTES NFR BLD: 17 % (ref 12–49)
LYMPHOCYTES NFR BLD: 28 % (ref 12–49)
LYMPHOCYTES NFR BLD: 28 % (ref 12–49)
MAGNESIUM SERPL-MCNC: 1.6 MG/DL (ref 1.6–2.4)
MAGNESIUM SERPL-MCNC: 1.7 MG/DL (ref 1.6–2.4)
MAGNESIUM SERPL-MCNC: 1.7 MG/DL (ref 1.6–2.4)
MAGNESIUM SERPL-MCNC: 2 MG/DL (ref 1.6–2.4)
MAGNESIUM SERPL-MCNC: 2.2 MG/DL (ref 1.6–2.4)
MCH RBC QN AUTO: 30.6 PG (ref 26–34)
MCH RBC QN AUTO: 30.7 PG (ref 26–34)
MCH RBC QN AUTO: 30.8 PG (ref 26–34)
MCH RBC QN AUTO: 30.8 PG (ref 26–34)
MCH RBC QN AUTO: 30.9 PG (ref 26–34)
MCH RBC QN AUTO: 31 PG (ref 26–34)
MCH RBC QN AUTO: 31.2 PG (ref 26–34)
MCH RBC QN AUTO: 31.2 PG (ref 26–34)
MCH RBC QN AUTO: 31.3 PG (ref 26–34)
MCH RBC QN AUTO: 31.5 PG (ref 26–34)
MCH RBC QN AUTO: 31.5 PG (ref 26–34)
MCHC RBC AUTO-ENTMCNC: 29.5 G/DL (ref 30–36.5)
MCHC RBC AUTO-ENTMCNC: 29.9 G/DL (ref 30–36.5)
MCHC RBC AUTO-ENTMCNC: 30 G/DL (ref 30–36.5)
MCHC RBC AUTO-ENTMCNC: 30.4 G/DL (ref 30–36.5)
MCHC RBC AUTO-ENTMCNC: 30.5 G/DL (ref 30–36.5)
MCHC RBC AUTO-ENTMCNC: 30.6 G/DL (ref 30–36.5)
MCHC RBC AUTO-ENTMCNC: 30.7 G/DL (ref 30–36.5)
MCHC RBC AUTO-ENTMCNC: 31 G/DL (ref 30–36.5)
MCHC RBC AUTO-ENTMCNC: 31.1 G/DL (ref 30–36.5)
MCHC RBC AUTO-ENTMCNC: 31.3 G/DL (ref 30–36.5)
MCHC RBC AUTO-ENTMCNC: 31.4 G/DL (ref 30–36.5)
MCHC RBC AUTO-ENTMCNC: 31.4 G/DL (ref 30–36.5)
MCV RBC AUTO: 100.7 FL (ref 80–99)
MCV RBC AUTO: 100.7 FL (ref 80–99)
MCV RBC AUTO: 101 FL (ref 80–99)
MCV RBC AUTO: 101.1 FL (ref 80–99)
MCV RBC AUTO: 101.8 FL (ref 80–99)
MCV RBC AUTO: 105 FL (ref 80–99)
MCV RBC AUTO: 105.4 FL (ref 80–99)
MCV RBC AUTO: 105.9 FL (ref 80–99)
MCV RBC AUTO: 98.5 FL (ref 80–99)
MCV RBC AUTO: 99.3 FL (ref 80–99)
MCV RBC AUTO: 99.6 FL (ref 80–99)
MCV RBC AUTO: 99.7 FL (ref 80–99)
MONOCYTES # BLD: 0.1 K/UL (ref 0–1)
MONOCYTES # BLD: 0.3 K/UL (ref 0–1)
MONOCYTES # BLD: 0.4 K/UL (ref 0–1)
MONOCYTES NFR BLD: 1 % (ref 5–13)
MONOCYTES NFR BLD: 11 % (ref 5–13)
MONOCYTES NFR BLD: 5 % (ref 5–13)
MONOCYTES NFR BLD: 7 % (ref 5–13)
MONOCYTES NFR BLD: 7 % (ref 5–13)
MONOCYTES NFR BLD: 9 % (ref 5–13)
MV DEC SLOPE: 1.34
NEUTS SEG # BLD: 1.9 K/UL (ref 1.8–8)
NEUTS SEG # BLD: 2.1 K/UL (ref 1.8–8)
NEUTS SEG # BLD: 3.5 K/UL (ref 1.8–8)
NEUTS SEG # BLD: 4.5 K/UL (ref 1.8–8)
NEUTS SEG # BLD: 4.9 K/UL (ref 1.8–8)
NEUTS SEG # BLD: 5 K/UL (ref 1.8–8)
NEUTS SEG NFR BLD: 60 % (ref 32–75)
NEUTS SEG NFR BLD: 61 % (ref 32–75)
NEUTS SEG NFR BLD: 75 % (ref 32–75)
NEUTS SEG NFR BLD: 78 % (ref 32–75)
NEUTS SEG NFR BLD: 79 % (ref 32–75)
NEUTS SEG NFR BLD: 87 % (ref 32–75)
NITRITE UR QL STRIP.AUTO: NEGATIVE
NRBC # BLD: 0 K/UL (ref 0–0.01)
NRBC BLD-RTO: 0 PER 100 WBC
O2/TOTAL GAS SETTING VFR VENT: 100 %
O2/TOTAL GAS SETTING VFR VENT: 40 %
O2/TOTAL GAS SETTING VFR VENT: 50 %
P-R INTERVAL, ECG05: 218 MS
PCO2 BLD: 33.1 MMHG (ref 35–45)
PCO2 BLD: 37.1 MMHG (ref 35–45)
PCO2 BLDV: 43.1 MMHG (ref 41–51)
PEEP RESPIRATORY: 8 CMH2O
PEEP RESPIRATORY: 8 CMH2O
PH BLD: 7.39 [PH] (ref 7.35–7.45)
PH BLD: 7.46 [PH] (ref 7.35–7.45)
PH BLDV: 7.4 [PH] (ref 7.32–7.42)
PH UR STRIP: 5 [PH] (ref 5–8)
PHOSPHATE SERPL-MCNC: 2.5 MG/DL (ref 2.6–4.7)
PHOSPHATE SERPL-MCNC: 2.5 MG/DL (ref 2.6–4.7)
PHOSPHATE SERPL-MCNC: 2.6 MG/DL (ref 2.6–4.7)
PHOSPHATE SERPL-MCNC: 2.7 MG/DL (ref 2.6–4.7)
PHOSPHATE SERPL-MCNC: 3.3 MG/DL (ref 2.6–4.7)
PHOSPHATE SERPL-MCNC: 3.3 MG/DL (ref 2.6–4.7)
PHOSPHATE SERPL-MCNC: 3.6 MG/DL (ref 2.6–4.7)
PLATELET # BLD AUTO: 146 K/UL (ref 150–400)
PLATELET # BLD AUTO: 148 K/UL (ref 150–400)
PLATELET # BLD AUTO: 157 K/UL (ref 150–400)
PLATELET # BLD AUTO: 164 K/UL (ref 150–400)
PLATELET # BLD AUTO: 167 K/UL (ref 150–400)
PLATELET # BLD AUTO: 177 K/UL (ref 150–400)
PLATELET # BLD AUTO: 181 K/UL (ref 150–400)
PLATELET # BLD AUTO: 185 K/UL (ref 150–400)
PLATELET # BLD AUTO: 185 K/UL (ref 150–400)
PLATELET # BLD AUTO: 194 K/UL (ref 150–400)
PLATELET # BLD AUTO: 202 K/UL (ref 150–400)
PLATELET # BLD AUTO: 203 K/UL (ref 150–400)
PLATELET # BLD AUTO: 219 K/UL (ref 150–400)
PLATELET # BLD AUTO: 221 K/UL (ref 150–400)
PMV BLD AUTO: 10 FL (ref 8.9–12.9)
PMV BLD AUTO: 10.1 FL (ref 8.9–12.9)
PMV BLD AUTO: 10.2 FL (ref 8.9–12.9)
PMV BLD AUTO: 10.3 FL (ref 8.9–12.9)
PMV BLD AUTO: 10.5 FL (ref 8.9–12.9)
PMV BLD AUTO: 10.5 FL (ref 8.9–12.9)
PMV BLD AUTO: 10.8 FL (ref 8.9–12.9)
PMV BLD AUTO: 11.2 FL (ref 8.9–12.9)
PMV BLD AUTO: 11.4 FL (ref 8.9–12.9)
PMV BLD AUTO: 9.8 FL (ref 8.9–12.9)
PMV BLD AUTO: 9.9 FL (ref 8.9–12.9)
PMV BLD AUTO: 9.9 FL (ref 8.9–12.9)
PO2 BLD: 191 MMHG (ref 80–100)
PO2 BLD: 41 MMHG (ref 80–100)
PO2 BLDV: 99 MMHG (ref 25–40)
POTASSIUM SERPL-SCNC: 3.7 MMOL/L (ref 3.5–5.1)
POTASSIUM SERPL-SCNC: 4.2 MMOL/L (ref 3.5–5.1)
POTASSIUM SERPL-SCNC: 4.4 MMOL/L (ref 3.5–5.1)
POTASSIUM SERPL-SCNC: 4.4 MMOL/L (ref 3.5–5.1)
POTASSIUM SERPL-SCNC: 4.6 MMOL/L (ref 3.5–5.1)
POTASSIUM SERPL-SCNC: 4.6 MMOL/L (ref 3.5–5.1)
POTASSIUM SERPL-SCNC: 4.8 MMOL/L (ref 3.5–5.1)
POTASSIUM SERPL-SCNC: 4.9 MMOL/L (ref 3.5–5.1)
POTASSIUM SERPL-SCNC: 5 MMOL/L (ref 3.5–5.1)
POTASSIUM SERPL-SCNC: 5.2 MMOL/L (ref 3.5–5.1)
PROCALCITONIN SERPL-MCNC: 0.06 NG/ML
PROCALCITONIN SERPL-MCNC: 0.17 NG/ML
PROCALCITONIN SERPL-MCNC: 0.18 NG/ML
PROT SERPL-MCNC: 5.8 G/DL (ref 6.4–8.2)
PROT UR STRIP-MCNC: 300 MG/DL
PROTHROMBIN TIME: 12.7 SEC (ref 9–11.1)
PV END DIASTOLIC VELOCITY: 1.3 MMHG
Q-T INTERVAL, ECG07: 416 MS
QRS DURATION, ECG06: 104 MS
QTC CALCULATION (BEZET), ECG08: 449 MS
RBC # BLD AUTO: 2.05 M/UL (ref 3.8–5.2)
RBC # BLD AUTO: 2.19 M/UL (ref 3.8–5.2)
RBC # BLD AUTO: 2.22 M/UL (ref 3.8–5.2)
RBC # BLD AUTO: 2.55 M/UL (ref 3.8–5.2)
RBC # BLD AUTO: 2.56 M/UL (ref 3.8–5.2)
RBC # BLD AUTO: 2.62 M/UL (ref 3.8–5.2)
RBC # BLD AUTO: 2.65 M/UL (ref 3.8–5.2)
RBC # BLD AUTO: 2.69 M/UL (ref 3.8–5.2)
RBC # BLD AUTO: 2.7 M/UL (ref 3.8–5.2)
RBC # BLD AUTO: 2.71 M/UL (ref 3.8–5.2)
RBC # BLD AUTO: 2.76 M/UL (ref 3.8–5.2)
RBC # BLD AUTO: 2.91 M/UL (ref 3.8–5.2)
RBC # BLD AUTO: 2.95 M/UL (ref 3.8–5.2)
RBC # BLD AUTO: 3.02 M/UL (ref 3.8–5.2)
RBC #/AREA URNS HPF: ABNORMAL /HPF (ref 0–5)
RBC MORPH BLD: ABNORMAL
SAMPLES BEING HELD,HOLD: NORMAL
SAO2 % BLD: 100 % (ref 92–97)
SAO2 % BLD: 76 % (ref 92–97)
SAO2 % BLDV: 98 % (ref 65–88)
SERVICE CMNT-IMP: ABNORMAL
SERVICE CMNT-IMP: NORMAL
SODIUM SERPL-SCNC: 137 MMOL/L (ref 136–145)
SODIUM SERPL-SCNC: 139 MMOL/L (ref 136–145)
SODIUM SERPL-SCNC: 140 MMOL/L (ref 136–145)
SODIUM SERPL-SCNC: 140 MMOL/L (ref 136–145)
SODIUM SERPL-SCNC: 142 MMOL/L (ref 136–145)
SODIUM SERPL-SCNC: 143 MMOL/L (ref 136–145)
SODIUM SERPL-SCNC: 143 MMOL/L (ref 136–145)
SODIUM SERPL-SCNC: 144 MMOL/L (ref 136–145)
SODIUM SERPL-SCNC: 145 MMOL/L (ref 136–145)
SODIUM SERPL-SCNC: 146 MMOL/L (ref 136–145)
SODIUM SERPL-SCNC: 146 MMOL/L (ref 136–145)
SODIUM SERPL-SCNC: 147 MMOL/L (ref 136–145)
SODIUM SERPL-SCNC: 147 MMOL/L (ref 136–145)
SODIUM SERPL-SCNC: 148 MMOL/L (ref 136–145)
SP GR UR REFRACTOMETRY: 1.02 (ref 1–1.03)
SPECIMEN EXP DATE BLD: NORMAL
SPECIMEN TYPE: ABNORMAL
STATUS OF UNIT,%ST: NORMAL
T4 FREE SERPL-MCNC: 1.2 NG/DL (ref 0.8–1.5)
TIBC SERPL-MCNC: 116 UG/DL (ref 250–450)
TOTAL RESP. RATE, ITRR: 14
TOTAL RESP. RATE, ITRR: 15
TOTAL RESP. RATE, ITRR: 16
TROPONIN I SERPL-MCNC: <0.05 NG/ML
TSH SERPL DL<=0.05 MIU/L-ACNC: 3.05 UIU/ML (ref 0.36–3.74)
TSH SERPL DL<=0.05 MIU/L-ACNC: 3.7 UIU/ML (ref 0.36–3.74)
UA: UC IF INDICATED,UAUC: ABNORMAL
UNIT DIVISION, %UDIV: 0
UROBILINOGEN UR QL STRIP.AUTO: 1 EU/DL (ref 0.2–1)
VENTILATION MODE VENT: ABNORMAL
VENTRICULAR RATE, ECG03: 70 BPM
VT SETTING VENT: 270 ML
VT SETTING VENT: 375 ML
VT SETTING VENT: 375 ML
WBC # BLD AUTO: 11.1 K/UL (ref 3.6–11)
WBC # BLD AUTO: 3 K/UL (ref 3.6–11)
WBC # BLD AUTO: 3.5 K/UL (ref 3.6–11)
WBC # BLD AUTO: 4.8 K/UL (ref 3.6–11)
WBC # BLD AUTO: 5 K/UL (ref 3.6–11)
WBC # BLD AUTO: 5.3 K/UL (ref 3.6–11)
WBC # BLD AUTO: 5.9 K/UL (ref 3.6–11)
WBC # BLD AUTO: 6.2 K/UL (ref 3.6–11)
WBC # BLD AUTO: 6.3 K/UL (ref 3.6–11)
WBC # BLD AUTO: 6.3 K/UL (ref 3.6–11)
WBC # BLD AUTO: 6.5 K/UL (ref 3.6–11)
WBC # BLD AUTO: 6.7 K/UL (ref 3.6–11)
WBC # BLD AUTO: 6.9 K/UL (ref 3.6–11)
WBC # BLD AUTO: 6.9 K/UL (ref 3.6–11)
WBC URNS QL MICRO: ABNORMAL /HPF (ref 0–4)

## 2020-01-01 PROCEDURE — 87040 BLOOD CULTURE FOR BACTERIA: CPT

## 2020-01-01 PROCEDURE — 99285 EMERGENCY DEPT VISIT HI MDM: CPT

## 2020-01-01 PROCEDURE — 74011250637 HC RX REV CODE- 250/637: Performed by: INTERNAL MEDICINE

## 2020-01-01 PROCEDURE — 74011250636 HC RX REV CODE- 250/636: Performed by: INTERNAL MEDICINE

## 2020-01-01 PROCEDURE — 36415 COLL VENOUS BLD VENIPUNCTURE: CPT

## 2020-01-01 PROCEDURE — 94003 VENT MGMT INPAT SUBQ DAY: CPT

## 2020-01-01 PROCEDURE — 31500 INSERT EMERGENCY AIRWAY: CPT

## 2020-01-01 PROCEDURE — 80048 BASIC METABOLIC PNL TOTAL CA: CPT

## 2020-01-01 PROCEDURE — 5A1955Z RESPIRATORY VENTILATION, GREATER THAN 96 CONSECUTIVE HOURS: ICD-10-PCS | Performed by: HOSPITALIST

## 2020-01-01 PROCEDURE — 74011250637 HC RX REV CODE- 250/637: Performed by: HOSPITALIST

## 2020-01-01 PROCEDURE — 74011250636 HC RX REV CODE- 250/636: Performed by: NURSE PRACTITIONER

## 2020-01-01 PROCEDURE — 65270000032 HC RM SEMIPRIVATE

## 2020-01-01 PROCEDURE — 74011000250 HC RX REV CODE- 250: Performed by: HOSPITALIST

## 2020-01-01 PROCEDURE — 84100 ASSAY OF PHOSPHORUS: CPT

## 2020-01-01 PROCEDURE — 82962 GLUCOSE BLOOD TEST: CPT

## 2020-01-01 PROCEDURE — 83735 ASSAY OF MAGNESIUM: CPT

## 2020-01-01 PROCEDURE — 74011000258 HC RX REV CODE- 258: Performed by: NURSE PRACTITIONER

## 2020-01-01 PROCEDURE — 96375 TX/PRO/DX INJ NEW DRUG ADDON: CPT

## 2020-01-01 PROCEDURE — 74011250636 HC RX REV CODE- 250/636: Performed by: HOSPITALIST

## 2020-01-01 PROCEDURE — 83690 ASSAY OF LIPASE: CPT

## 2020-01-01 PROCEDURE — 82140 ASSAY OF AMMONIA: CPT

## 2020-01-01 PROCEDURE — 74011000250 HC RX REV CODE- 250

## 2020-01-01 PROCEDURE — 74011250636 HC RX REV CODE- 250/636: Performed by: SURGERY

## 2020-01-01 PROCEDURE — 77030018798 HC PMP KT ENTRL FED COVD -A

## 2020-01-01 PROCEDURE — 51702 INSERT TEMP BLADDER CATH: CPT

## 2020-01-01 PROCEDURE — 85027 COMPLETE CBC AUTOMATED: CPT

## 2020-01-01 PROCEDURE — 84145 PROCALCITONIN (PCT): CPT

## 2020-01-01 PROCEDURE — 65610000006 HC RM INTENSIVE CARE

## 2020-01-01 PROCEDURE — 30233N1 TRANSFUSION OF NONAUTOLOGOUS RED BLOOD CELLS INTO PERIPHERAL VEIN, PERCUTANEOUS APPROACH: ICD-10-PCS | Performed by: INTERNAL MEDICINE

## 2020-01-01 PROCEDURE — 77030038269 HC DRN EXT URIN PURWCK BARD -A

## 2020-01-01 PROCEDURE — 83540 ASSAY OF IRON: CPT

## 2020-01-01 PROCEDURE — 51798 US URINE CAPACITY MEASURE: CPT

## 2020-01-01 PROCEDURE — 70551 MRI BRAIN STEM W/O DYE: CPT

## 2020-01-01 PROCEDURE — 87070 CULTURE OTHR SPECIMN AEROBIC: CPT

## 2020-01-01 PROCEDURE — 74011636637 HC RX REV CODE- 636/637: Performed by: HOSPITALIST

## 2020-01-01 PROCEDURE — 74011000258 HC RX REV CODE- 258: Performed by: HOSPITALIST

## 2020-01-01 PROCEDURE — 85025 COMPLETE CBC W/AUTO DIFF WBC: CPT

## 2020-01-01 PROCEDURE — 94640 AIRWAY INHALATION TREATMENT: CPT

## 2020-01-01 PROCEDURE — 83880 ASSAY OF NATRIURETIC PEPTIDE: CPT

## 2020-01-01 PROCEDURE — 70450 CT HEAD/BRAIN W/O DYE: CPT

## 2020-01-01 PROCEDURE — 74011000258 HC RX REV CODE- 258: Performed by: INTERNAL MEDICINE

## 2020-01-01 PROCEDURE — 93306 TTE W/DOPPLER COMPLETE: CPT

## 2020-01-01 PROCEDURE — 82803 BLOOD GASES ANY COMBINATION: CPT

## 2020-01-01 PROCEDURE — 77030003560 HC NDL HUBR BARD -A

## 2020-01-01 PROCEDURE — 84439 ASSAY OF FREE THYROXINE: CPT

## 2020-01-01 PROCEDURE — 82550 ASSAY OF CK (CPK): CPT

## 2020-01-01 PROCEDURE — 36591 DRAW BLOOD OFF VENOUS DEVICE: CPT

## 2020-01-01 PROCEDURE — 74011000250 HC RX REV CODE- 250: Performed by: INTERNAL MEDICINE

## 2020-01-01 PROCEDURE — 93308 TTE F-UP OR LMTD: CPT

## 2020-01-01 PROCEDURE — 86900 BLOOD TYPING SEROLOGIC ABO: CPT

## 2020-01-01 PROCEDURE — 65660000001 HC RM ICU INTERMED STEPDOWN

## 2020-01-01 PROCEDURE — 65660000000 HC RM CCU STEPDOWN

## 2020-01-01 PROCEDURE — 86923 COMPATIBILITY TEST ELECTRIC: CPT

## 2020-01-01 PROCEDURE — 95816 EEG AWAKE AND DROWSY: CPT | Performed by: PSYCHIATRY & NEUROLOGY

## 2020-01-01 PROCEDURE — 80069 RENAL FUNCTION PANEL: CPT

## 2020-01-01 PROCEDURE — 74011250636 HC RX REV CODE- 250/636: Performed by: EMERGENCY MEDICINE

## 2020-01-01 PROCEDURE — 74011000250 HC RX REV CODE- 250: Performed by: EMERGENCY MEDICINE

## 2020-01-01 PROCEDURE — 77010033678 HC OXYGEN DAILY

## 2020-01-01 PROCEDURE — 94002 VENT MGMT INPAT INIT DAY: CPT

## 2020-01-01 PROCEDURE — P9016 RBC LEUKOCYTES REDUCED: HCPCS

## 2020-01-01 PROCEDURE — 77030005513 HC CATH URETH FOL11 MDII -B

## 2020-01-01 PROCEDURE — 71045 X-RAY EXAM CHEST 1 VIEW: CPT

## 2020-01-01 PROCEDURE — 77030018836 HC SOL IRR NACL ICUM -A

## 2020-01-01 PROCEDURE — 80053 COMPREHEN METABOLIC PANEL: CPT

## 2020-01-01 PROCEDURE — 82728 ASSAY OF FERRITIN: CPT

## 2020-01-01 PROCEDURE — C1729 CATH, DRAINAGE: HCPCS

## 2020-01-01 PROCEDURE — 36600 WITHDRAWAL OF ARTERIAL BLOOD: CPT

## 2020-01-01 PROCEDURE — 94664 DEMO&/EVAL PT USE INHALER: CPT

## 2020-01-01 PROCEDURE — 3E0G76Z INTRODUCTION OF NUTRITIONAL SUBSTANCE INTO UPPER GI, VIA NATURAL OR ARTIFICIAL OPENING: ICD-10-PCS | Performed by: HOSPITALIST

## 2020-01-01 PROCEDURE — 93005 ELECTROCARDIOGRAM TRACING: CPT

## 2020-01-01 PROCEDURE — 74018 RADEX ABDOMEN 1 VIEW: CPT

## 2020-01-01 PROCEDURE — 77030039264

## 2020-01-01 PROCEDURE — 82272 OCCULT BLD FECES 1-3 TESTS: CPT

## 2020-01-01 PROCEDURE — 77030041247 HC PROTECTOR HEEL HEELMEDIX MDII -B

## 2020-01-01 PROCEDURE — 85610 PROTHROMBIN TIME: CPT

## 2020-01-01 PROCEDURE — 83605 ASSAY OF LACTIC ACID: CPT

## 2020-01-01 PROCEDURE — 77030008771 HC TU NG SALEM SUMP -A

## 2020-01-01 PROCEDURE — 87804 INFLUENZA ASSAY W/OPTIC: CPT

## 2020-01-01 PROCEDURE — 36430 TRANSFUSION BLD/BLD COMPNT: CPT

## 2020-01-01 PROCEDURE — 84443 ASSAY THYROID STIM HORMONE: CPT

## 2020-01-01 PROCEDURE — 84484 ASSAY OF TROPONIN QUANT: CPT

## 2020-01-01 PROCEDURE — 85018 HEMOGLOBIN: CPT

## 2020-01-01 PROCEDURE — 77030013140 HC MSK NEB VYRM -A

## 2020-01-01 PROCEDURE — 94762 N-INVAS EAR/PLS OXIMTRY CONT: CPT

## 2020-01-01 PROCEDURE — 96374 THER/PROPH/DIAG INJ IV PUSH: CPT

## 2020-01-01 PROCEDURE — 74011000258 HC RX REV CODE- 258: Performed by: EMERGENCY MEDICINE

## 2020-01-01 RX ORDER — DEXTROSE, SODIUM CHLORIDE, AND POTASSIUM CHLORIDE 5; .45; .15 G/100ML; G/100ML; G/100ML
75 INJECTION INTRAVENOUS CONTINUOUS
Status: DISCONTINUED | OUTPATIENT
Start: 2020-01-01 | End: 2020-01-01

## 2020-01-01 RX ORDER — HYDRALAZINE HYDROCHLORIDE 20 MG/ML
10 INJECTION INTRAMUSCULAR; INTRAVENOUS
Status: DISCONTINUED | OUTPATIENT
Start: 2020-01-01 | End: 2020-01-01

## 2020-01-01 RX ORDER — DEXTROSE 50 % IN WATER (D50W) INTRAVENOUS SYRINGE
Status: DISPENSED
Start: 2020-01-01 | End: 2020-01-01

## 2020-01-01 RX ORDER — INSULIN LISPRO 100 [IU]/ML
INJECTION, SOLUTION INTRAVENOUS; SUBCUTANEOUS EVERY 6 HOURS
Status: DISCONTINUED | OUTPATIENT
Start: 2020-01-01 | End: 2020-01-01

## 2020-01-01 RX ORDER — LOVASTATIN 20 MG/1
20 TABLET ORAL DAILY
Status: DISCONTINUED | OUTPATIENT
Start: 2020-01-01 | End: 2020-01-01

## 2020-01-01 RX ORDER — CARVEDILOL 6.25 MG/1
6.25 TABLET ORAL 2 TIMES DAILY WITH MEALS
Qty: 60 TAB | Refills: 0 | Status: SHIPPED | OUTPATIENT
Start: 2020-01-01

## 2020-01-01 RX ORDER — CARBIDOPA AND LEVODOPA 25; 100 MG/1; MG/1
2 TABLET ORAL 4 TIMES DAILY
Status: DISCONTINUED | OUTPATIENT
Start: 2020-01-01 | End: 2020-01-01 | Stop reason: HOSPADM

## 2020-01-01 RX ORDER — SODIUM CHLORIDE FOR INHALATION 3 %
4 VIAL, NEBULIZER (ML) INHALATION EVERY 6 HOURS
Status: DISCONTINUED | OUTPATIENT
Start: 2020-01-01 | End: 2020-01-01

## 2020-01-01 RX ORDER — BACITRACIN 500 UNIT/G
PACKET (EA) TOPICAL
Status: COMPLETED
Start: 2020-01-01 | End: 2020-01-01

## 2020-01-01 RX ORDER — MAGNESIUM SULFATE 100 %
4 CRYSTALS MISCELLANEOUS AS NEEDED
Status: DISCONTINUED | OUTPATIENT
Start: 2020-01-01 | End: 2020-01-01 | Stop reason: SDUPTHER

## 2020-01-01 RX ORDER — HYDRALAZINE HYDROCHLORIDE 50 MG/1
100 TABLET, FILM COATED ORAL 3 TIMES DAILY
Status: DISCONTINUED | OUTPATIENT
Start: 2020-01-01 | End: 2020-01-01 | Stop reason: HOSPADM

## 2020-01-01 RX ORDER — DOCUSATE SODIUM 50 MG/5ML
100 LIQUID ORAL DAILY
Status: DISCONTINUED | OUTPATIENT
Start: 2020-01-01 | End: 2020-01-01

## 2020-01-01 RX ORDER — DEXTROSE 50 % IN WATER (D50W) INTRAVENOUS SYRINGE
25
Status: COMPLETED | OUTPATIENT
Start: 2020-01-01 | End: 2020-01-01

## 2020-01-01 RX ORDER — BALSAM PERU/CASTOR OIL
OINTMENT (GRAM) TOPICAL EVERY 8 HOURS
Status: DISCONTINUED | OUTPATIENT
Start: 2020-01-01 | End: 2020-01-01

## 2020-01-01 RX ORDER — BALSAM PERU/CASTOR OIL
OINTMENT (GRAM) TOPICAL 2 TIMES DAILY
Status: DISCONTINUED | OUTPATIENT
Start: 2020-01-01 | End: 2020-01-01

## 2020-01-01 RX ORDER — FUROSEMIDE 40 MG/1
40 TABLET ORAL DAILY
Status: DISCONTINUED | OUTPATIENT
Start: 2020-01-01 | End: 2020-01-01

## 2020-01-01 RX ORDER — CARVEDILOL 3.12 MG/1
3.12 TABLET ORAL 2 TIMES DAILY WITH MEALS
Status: DISCONTINUED | OUTPATIENT
Start: 2020-01-01 | End: 2020-01-01

## 2020-01-01 RX ORDER — MAGNESIUM SULFATE 100 %
4 CRYSTALS MISCELLANEOUS AS NEEDED
Status: DISCONTINUED | OUTPATIENT
Start: 2020-01-01 | End: 2020-01-01

## 2020-01-01 RX ORDER — FUROSEMIDE 10 MG/ML
40 INJECTION INTRAMUSCULAR; INTRAVENOUS ONCE
Status: COMPLETED | OUTPATIENT
Start: 2020-01-01 | End: 2020-01-01

## 2020-01-01 RX ORDER — INSULIN LISPRO 100 [IU]/ML
INJECTION, SOLUTION INTRAVENOUS; SUBCUTANEOUS
Status: DISCONTINUED | OUTPATIENT
Start: 2020-01-01 | End: 2020-01-01

## 2020-01-01 RX ORDER — LEVOFLOXACIN 5 MG/ML
500 INJECTION, SOLUTION INTRAVENOUS EVERY 24 HOURS
Status: DISCONTINUED | OUTPATIENT
Start: 2020-01-01 | End: 2020-01-01

## 2020-01-01 RX ORDER — LEVOFLOXACIN 500 MG/1
500 TABLET, FILM COATED ORAL
Status: COMPLETED | OUTPATIENT
Start: 2020-01-01 | End: 2020-01-01

## 2020-01-01 RX ORDER — DOXYCYCLINE 100 MG/1
100 TABLET ORAL 2 TIMES DAILY
COMMUNITY
End: 2020-01-01

## 2020-01-01 RX ORDER — HYDRALAZINE HYDROCHLORIDE 50 MG/1
50 TABLET, FILM COATED ORAL 3 TIMES DAILY
Status: DISCONTINUED | OUTPATIENT
Start: 2020-01-01 | End: 2020-01-01

## 2020-01-01 RX ORDER — DEXTROSE MONOHYDRATE 100 MG/ML
0-250 INJECTION, SOLUTION INTRAVENOUS AS NEEDED
Status: DISCONTINUED | OUTPATIENT
Start: 2020-01-01 | End: 2020-01-01 | Stop reason: HOSPADM

## 2020-01-01 RX ORDER — CARBIDOPA AND LEVODOPA 25; 100 MG/1; MG/1
2 TABLET ORAL 4 TIMES DAILY
Status: DISCONTINUED | OUTPATIENT
Start: 2020-01-01 | End: 2020-01-01

## 2020-01-01 RX ORDER — BENZONATATE 100 MG/1
100 CAPSULE ORAL
COMMUNITY

## 2020-01-01 RX ORDER — SODIUM CHLORIDE 9 MG/ML
250 INJECTION, SOLUTION INTRAVENOUS AS NEEDED
Status: DISCONTINUED | OUTPATIENT
Start: 2020-01-01 | End: 2020-01-01 | Stop reason: HOSPADM

## 2020-01-01 RX ORDER — MAGNESIUM SULFATE HEPTAHYDRATE 40 MG/ML
2 INJECTION, SOLUTION INTRAVENOUS ONCE
Status: COMPLETED | OUTPATIENT
Start: 2020-01-01 | End: 2020-01-01

## 2020-01-01 RX ORDER — FUROSEMIDE 10 MG/ML
40 INJECTION INTRAMUSCULAR; INTRAVENOUS DAILY
Status: DISCONTINUED | OUTPATIENT
Start: 2020-01-01 | End: 2020-01-01

## 2020-01-01 RX ORDER — LORAZEPAM 2 MG/ML
1 INJECTION INTRAMUSCULAR
Status: DISCONTINUED | OUTPATIENT
Start: 2020-01-01 | End: 2020-01-27 | Stop reason: HOSPADM

## 2020-01-01 RX ORDER — AMLODIPINE BESYLATE 5 MG/1
10 TABLET ORAL DAILY
Status: DISCONTINUED | OUTPATIENT
Start: 2020-01-01 | End: 2020-01-01 | Stop reason: HOSPADM

## 2020-01-01 RX ORDER — SODIUM CHLORIDE 0.9 % (FLUSH) 0.9 %
5-40 SYRINGE (ML) INJECTION EVERY 8 HOURS
Status: DISCONTINUED | OUTPATIENT
Start: 2020-01-01 | End: 2020-01-01 | Stop reason: HOSPADM

## 2020-01-01 RX ORDER — INSULIN LISPRO 100 [IU]/ML
INJECTION, SOLUTION INTRAVENOUS; SUBCUTANEOUS
Status: DISCONTINUED | OUTPATIENT
Start: 2020-01-01 | End: 2020-01-01 | Stop reason: HOSPADM

## 2020-01-01 RX ORDER — SODIUM CHLORIDE 0.9 % (FLUSH) 0.9 %
5-40 SYRINGE (ML) INJECTION AS NEEDED
Status: DISCONTINUED | OUTPATIENT
Start: 2020-01-01 | End: 2020-01-01 | Stop reason: HOSPADM

## 2020-01-01 RX ORDER — HEPARIN 100 UNIT/ML
500 SYRINGE INTRAVENOUS AS NEEDED
Status: DISCONTINUED | OUTPATIENT
Start: 2020-01-01 | End: 2020-01-01 | Stop reason: HOSPADM

## 2020-01-01 RX ORDER — ALBUTEROL SULFATE 0.83 MG/ML
2.5 SOLUTION RESPIRATORY (INHALATION)
Status: DISCONTINUED | OUTPATIENT
Start: 2020-01-01 | End: 2020-01-01 | Stop reason: HOSPADM

## 2020-01-01 RX ORDER — SODIUM CHLORIDE 0.9 % (FLUSH) 0.9 %
5-40 SYRINGE (ML) INJECTION EVERY 8 HOURS
Status: DISCONTINUED | OUTPATIENT
Start: 2020-01-01 | End: 2020-01-01

## 2020-01-01 RX ORDER — ROCURONIUM BROMIDE 10 MG/ML
100 INJECTION, SOLUTION INTRAVENOUS
Status: COMPLETED | OUTPATIENT
Start: 2020-01-01 | End: 2020-01-01

## 2020-01-01 RX ORDER — DEXTROSE 50 % IN WATER (D50W) INTRAVENOUS SYRINGE
Status: COMPLETED
Start: 2020-01-01 | End: 2020-01-01

## 2020-01-01 RX ORDER — GUAIFENESIN 100 MG/5ML
200 SOLUTION ORAL
COMMUNITY

## 2020-01-01 RX ORDER — CARVEDILOL 6.25 MG/1
6.25 TABLET ORAL 2 TIMES DAILY WITH MEALS
Status: DISCONTINUED | OUTPATIENT
Start: 2020-01-01 | End: 2020-01-01 | Stop reason: HOSPADM

## 2020-01-01 RX ORDER — MAGNESIUM SULFATE 100 %
4 CRYSTALS MISCELLANEOUS AS NEEDED
Status: DISCONTINUED | OUTPATIENT
Start: 2020-01-01 | End: 2020-01-01 | Stop reason: HOSPADM

## 2020-01-01 RX ORDER — ETOMIDATE 2 MG/ML
20 INJECTION INTRAVENOUS
Status: COMPLETED | OUTPATIENT
Start: 2020-01-01 | End: 2020-01-01

## 2020-01-01 RX ORDER — DEXTROSE MONOHYDRATE 100 MG/ML
0-250 INJECTION, SOLUTION INTRAVENOUS AS NEEDED
Status: DISCONTINUED | OUTPATIENT
Start: 2020-01-01 | End: 2020-01-01

## 2020-01-01 RX ORDER — DEXTROSE MONOHYDRATE 100 MG/ML
125 INJECTION, SOLUTION INTRAVENOUS CONTINUOUS
Status: DISCONTINUED | OUTPATIENT
Start: 2020-01-01 | End: 2020-01-01

## 2020-01-01 RX ORDER — SODIUM CHLORIDE, SODIUM LACTATE, POTASSIUM CHLORIDE, CALCIUM CHLORIDE 600; 310; 30; 20 MG/100ML; MG/100ML; MG/100ML; MG/100ML
75 INJECTION, SOLUTION INTRAVENOUS CONTINUOUS
Status: DISCONTINUED | OUTPATIENT
Start: 2020-01-01 | End: 2020-01-01

## 2020-01-01 RX ORDER — FUROSEMIDE 40 MG/1
TABLET ORAL
Qty: 30 TAB | Refills: 0 | Status: SHIPPED | OUTPATIENT
Start: 2020-01-01

## 2020-01-01 RX ORDER — DOCUSATE SODIUM 100 MG/1
100 CAPSULE, LIQUID FILLED ORAL DAILY
Status: DISCONTINUED | OUTPATIENT
Start: 2020-01-01 | End: 2020-01-01

## 2020-01-01 RX ORDER — KETOROLAC TROMETHAMINE 30 MG/ML
15 INJECTION, SOLUTION INTRAMUSCULAR; INTRAVENOUS
Status: DISCONTINUED | OUTPATIENT
Start: 2020-01-01 | End: 2020-01-27 | Stop reason: HOSPADM

## 2020-01-01 RX ORDER — ALBUTEROL SULFATE 0.83 MG/ML
2.5 SOLUTION RESPIRATORY (INHALATION)
Status: DISCONTINUED | OUTPATIENT
Start: 2020-01-01 | End: 2020-01-01

## 2020-01-01 RX ORDER — GLYCOPYRROLATE 0.2 MG/ML
0.1 INJECTION INTRAMUSCULAR; INTRAVENOUS
Status: DISCONTINUED | OUTPATIENT
Start: 2020-01-01 | End: 2020-01-27 | Stop reason: HOSPADM

## 2020-01-01 RX ORDER — DEXTROSE MONOHYDRATE 100 MG/ML
0-250 INJECTION, SOLUTION INTRAVENOUS AS NEEDED
Status: DISCONTINUED | OUTPATIENT
Start: 2020-01-01 | End: 2020-01-01 | Stop reason: SDUPTHER

## 2020-01-01 RX ORDER — LOSARTAN POTASSIUM 50 MG/1
50 TABLET ORAL DAILY
Qty: 30 TAB | Refills: 0 | Status: SHIPPED | OUTPATIENT
Start: 2020-01-01

## 2020-01-01 RX ORDER — SODIUM CHLORIDE 0.9 % (FLUSH) 0.9 %
5-40 SYRINGE (ML) INJECTION AS NEEDED
Status: DISCONTINUED | OUTPATIENT
Start: 2020-01-01 | End: 2020-01-01

## 2020-01-01 RX ORDER — HEPARIN SODIUM 5000 [USP'U]/ML
5000 INJECTION, SOLUTION INTRAVENOUS; SUBCUTANEOUS EVERY 8 HOURS
Status: DISCONTINUED | OUTPATIENT
Start: 2020-01-01 | End: 2020-01-01

## 2020-01-01 RX ORDER — LEVOFLOXACIN 5 MG/ML
500 INJECTION, SOLUTION INTRAVENOUS
Status: DISCONTINUED | OUTPATIENT
Start: 2020-01-01 | End: 2020-01-01

## 2020-01-01 RX ORDER — CHLORHEXIDINE GLUCONATE 0.12 MG/ML
15 RINSE ORAL EVERY 12 HOURS
Status: DISCONTINUED | OUTPATIENT
Start: 2020-01-01 | End: 2020-01-01

## 2020-01-01 RX ORDER — HYDRALAZINE HYDROCHLORIDE 100 MG/1
100 TABLET, FILM COATED ORAL 3 TIMES DAILY
Qty: 90 TAB | Refills: 0 | Status: SHIPPED | OUTPATIENT
Start: 2020-01-01

## 2020-01-01 RX ORDER — DEXTROSE MONOHYDRATE 50 MG/ML
100 INJECTION, SOLUTION INTRAVENOUS CONTINUOUS
Status: DISCONTINUED | OUTPATIENT
Start: 2020-01-01 | End: 2020-01-01

## 2020-01-01 RX ORDER — DEXTROSE MONOHYDRATE 100 MG/ML
125 INJECTION, SOLUTION INTRAVENOUS CONTINUOUS
Status: DISCONTINUED | OUTPATIENT
Start: 2020-01-01 | End: 2020-01-01 | Stop reason: SDUPTHER

## 2020-01-01 RX ORDER — DOPAMINE HYDROCHLORIDE 320 MG/100ML
3-20 INJECTION, SOLUTION INTRAVENOUS
Status: DISCONTINUED | OUTPATIENT
Start: 2020-01-01 | End: 2020-01-01

## 2020-01-01 RX ORDER — FUROSEMIDE 10 MG/ML
20 INJECTION INTRAMUSCULAR; INTRAVENOUS ONCE
Status: COMPLETED | OUTPATIENT
Start: 2020-01-01 | End: 2020-01-01

## 2020-01-01 RX ORDER — LOSARTAN POTASSIUM 50 MG/1
50 TABLET ORAL DAILY
Status: DISCONTINUED | OUTPATIENT
Start: 2020-01-01 | End: 2020-01-01 | Stop reason: HOSPADM

## 2020-01-01 RX ORDER — DEXTROSE, SODIUM CHLORIDE, SODIUM LACTATE, POTASSIUM CHLORIDE, AND CALCIUM CHLORIDE 5; .6; .31; .03; .02 G/100ML; G/100ML; G/100ML; G/100ML; G/100ML
50 INJECTION, SOLUTION INTRAVENOUS ONCE
Status: COMPLETED | OUTPATIENT
Start: 2020-01-01 | End: 2020-01-01

## 2020-01-01 RX ORDER — SODIUM CHLORIDE 9 MG/ML
125 INJECTION, SOLUTION INTRAVENOUS CONTINUOUS
Status: DISCONTINUED | OUTPATIENT
Start: 2020-01-01 | End: 2020-01-01

## 2020-01-01 RX ORDER — MORPHINE SULFATE 2 MG/ML
2 INJECTION, SOLUTION INTRAMUSCULAR; INTRAVENOUS
Status: DISCONTINUED | OUTPATIENT
Start: 2020-01-01 | End: 2020-01-27 | Stop reason: HOSPADM

## 2020-01-01 RX ORDER — HYDRALAZINE HYDROCHLORIDE 20 MG/ML
10 INJECTION INTRAMUSCULAR; INTRAVENOUS
Status: DISCONTINUED | OUTPATIENT
Start: 2020-01-01 | End: 2020-01-01 | Stop reason: HOSPADM

## 2020-01-01 RX ORDER — FUROSEMIDE 40 MG/1
40 TABLET ORAL
Status: DISCONTINUED | OUTPATIENT
Start: 2020-01-01 | End: 2020-01-01 | Stop reason: HOSPADM

## 2020-01-01 RX ORDER — AMLODIPINE BESYLATE 10 MG/1
10 TABLET ORAL DAILY
Qty: 30 TAB | Refills: 0 | Status: SHIPPED | OUTPATIENT
Start: 2020-01-01

## 2020-01-01 RX ADMIN — LEVOFLOXACIN 500 MG: 500 TABLET, FILM COATED ORAL at 16:17

## 2020-01-01 RX ADMIN — SODIUM CHLORIDE SOLN NEBU 3% 4 ML: 3 NEBU SOLN at 18:17

## 2020-01-01 RX ADMIN — CARBIDOPA AND LEVODOPA 2 TABLET: 25; 100 TABLET ORAL at 14:13

## 2020-01-01 RX ADMIN — HEPARIN 500 UNITS: 100 SYRINGE at 17:56

## 2020-01-01 RX ADMIN — CASTOR OIL AND BALSAM, PERU: 788; 87 OINTMENT TOPICAL at 21:00

## 2020-01-01 RX ADMIN — Medication 10 ML: at 23:00

## 2020-01-01 RX ADMIN — CASTOR OIL AND BALSAM, PERU: 788; 87 OINTMENT TOPICAL at 05:01

## 2020-01-01 RX ADMIN — PATIROMER 8.4 G: 8.4 POWDER, FOR SUSPENSION ORAL at 13:02

## 2020-01-01 RX ADMIN — LOVASTATIN 20 MG: 20 TABLET ORAL at 11:19

## 2020-01-01 RX ADMIN — DEXTROSE MONOHYDRATE 100 ML/HR: 5 INJECTION, SOLUTION INTRAVENOUS at 07:58

## 2020-01-01 RX ADMIN — Medication 10 ML: at 21:00

## 2020-01-01 RX ADMIN — Medication 10 ML: at 06:00

## 2020-01-01 RX ADMIN — CHLORHEXIDINE GLUCONATE 15 ML: 0.12 RINSE ORAL at 20:16

## 2020-01-01 RX ADMIN — GLYCOPYRROLATE 0.1 MG: 0.2 INJECTION, SOLUTION INTRAMUSCULAR; INTRAVENOUS at 15:14

## 2020-01-01 RX ADMIN — HEPARIN SODIUM 5000 UNITS: 5000 INJECTION INTRAVENOUS; SUBCUTANEOUS at 00:21

## 2020-01-01 RX ADMIN — HYDRALAZINE HYDROCHLORIDE 50 MG: 50 TABLET, FILM COATED ORAL at 09:37

## 2020-01-01 RX ADMIN — CARVEDILOL 6.25 MG: 6.25 TABLET, FILM COATED ORAL at 07:24

## 2020-01-01 RX ADMIN — SODIUM CHLORIDE 750 MG: 900 INJECTION, SOLUTION INTRAVENOUS at 10:49

## 2020-01-01 RX ADMIN — FAMOTIDINE 20 MG: 10 INJECTION, SOLUTION INTRAVENOUS at 16:41

## 2020-01-01 RX ADMIN — SODIUM CHLORIDE, SODIUM LACTATE, POTASSIUM CHLORIDE, AND CALCIUM CHLORIDE 75 ML/HR: 600; 310; 30; 20 INJECTION, SOLUTION INTRAVENOUS at 04:17

## 2020-01-01 RX ADMIN — ALBUTEROL SULFATE 2.5 MG: 2.5 SOLUTION RESPIRATORY (INHALATION) at 12:15

## 2020-01-01 RX ADMIN — CARBIDOPA AND LEVODOPA 2 TABLET: 25; 100 TABLET ORAL at 17:06

## 2020-01-01 RX ADMIN — HYDRALAZINE HYDROCHLORIDE 100 MG: 50 TABLET, FILM COATED ORAL at 09:31

## 2020-01-01 RX ADMIN — LOVASTATIN 20 MG: 20 TABLET ORAL at 09:14

## 2020-01-01 RX ADMIN — FAMOTIDINE 20 MG: 10 INJECTION, SOLUTION INTRAVENOUS at 16:25

## 2020-01-01 RX ADMIN — ALBUTEROL SULFATE 2.5 MG: 2.5 SOLUTION RESPIRATORY (INHALATION) at 19:48

## 2020-01-01 RX ADMIN — CHLORHEXIDINE GLUCONATE 15 ML: 0.12 RINSE ORAL at 08:06

## 2020-01-01 RX ADMIN — DEXTROSE MONOHYDRATE 125 ML: 10 INJECTION, SOLUTION INTRAVENOUS at 06:14

## 2020-01-01 RX ADMIN — SODIUM CHLORIDE 500 ML: 900 INJECTION, SOLUTION INTRAVENOUS at 18:04

## 2020-01-01 RX ADMIN — Medication 1 CAPSULE: at 08:03

## 2020-01-01 RX ADMIN — LOSARTAN POTASSIUM 50 MG: 50 TABLET, FILM COATED ORAL at 09:32

## 2020-01-01 RX ADMIN — Medication 16 G: at 05:21

## 2020-01-01 RX ADMIN — DEXTROSE MONOHYDRATE 100 ML/HR: 5 INJECTION, SOLUTION INTRAVENOUS at 17:52

## 2020-01-01 RX ADMIN — CARBIDOPA AND LEVODOPA 2 TABLET: 25; 100 TABLET ORAL at 21:53

## 2020-01-01 RX ADMIN — CARBIDOPA AND LEVODOPA 2 TABLET: 25; 100 TABLET ORAL at 13:28

## 2020-01-01 RX ADMIN — CEFEPIME HYDROCHLORIDE 2 G: 2 INJECTION, POWDER, FOR SOLUTION INTRAVENOUS at 10:12

## 2020-01-01 RX ADMIN — FAMOTIDINE 20 MG: 10 INJECTION, SOLUTION INTRAVENOUS at 17:51

## 2020-01-01 RX ADMIN — BACITRACIN 1 PACKET: 500 OINTMENT TOPICAL at 17:56

## 2020-01-01 RX ADMIN — CARBIDOPA AND LEVODOPA 2 TABLET: 25; 100 TABLET ORAL at 10:03

## 2020-01-01 RX ADMIN — LEVOFLOXACIN 500 MG: 5 INJECTION, SOLUTION INTRAVENOUS at 23:18

## 2020-01-01 RX ADMIN — GLYCOPYRROLATE 0.1 MG: 0.2 INJECTION, SOLUTION INTRAMUSCULAR; INTRAVENOUS at 12:39

## 2020-01-01 RX ADMIN — CARVEDILOL 6.25 MG: 6.25 TABLET, FILM COATED ORAL at 17:50

## 2020-01-01 RX ADMIN — CEFEPIME HYDROCHLORIDE 2 G: 2 INJECTION, POWDER, FOR SOLUTION INTRAVENOUS at 12:43

## 2020-01-01 RX ADMIN — ALBUTEROL SULFATE 2.5 MG: 2.5 SOLUTION RESPIRATORY (INHALATION) at 20:07

## 2020-01-01 RX ADMIN — ALBUTEROL SULFATE 2.5 MG: 2.5 SOLUTION RESPIRATORY (INHALATION) at 09:07

## 2020-01-01 RX ADMIN — SODIUM CHLORIDE, SODIUM LACTATE, POTASSIUM CHLORIDE, AND CALCIUM CHLORIDE 75 ML/HR: 600; 310; 30; 20 INJECTION, SOLUTION INTRAVENOUS at 09:00

## 2020-01-01 RX ADMIN — HYDRALAZINE HYDROCHLORIDE 10 MG: 20 INJECTION INTRAMUSCULAR; INTRAVENOUS at 00:46

## 2020-01-01 RX ADMIN — Medication 10 ML: at 14:00

## 2020-01-01 RX ADMIN — CARBIDOPA AND LEVODOPA 2 TABLET: 25; 100 TABLET ORAL at 13:34

## 2020-01-01 RX ADMIN — CARBIDOPA AND LEVODOPA 2 TABLET: 25; 100 TABLET ORAL at 23:14

## 2020-01-01 RX ADMIN — HEPARIN SODIUM 5000 UNITS: 5000 INJECTION INTRAVENOUS; SUBCUTANEOUS at 08:10

## 2020-01-01 RX ADMIN — CARBIDOPA AND LEVODOPA 2 TABLET: 25; 100 TABLET ORAL at 21:39

## 2020-01-01 RX ADMIN — Medication 1 CAPSULE: at 08:22

## 2020-01-01 RX ADMIN — CASTOR OIL AND BALSAM, PERU: 788; 87 OINTMENT TOPICAL at 17:07

## 2020-01-01 RX ADMIN — CARVEDILOL 3.12 MG: 3.12 TABLET, FILM COATED ORAL at 16:31

## 2020-01-01 RX ADMIN — CARBIDOPA AND LEVODOPA 2 TABLET: 25; 100 TABLET ORAL at 09:32

## 2020-01-01 RX ADMIN — CARVEDILOL 6.25 MG: 6.25 TABLET, FILM COATED ORAL at 07:10

## 2020-01-01 RX ADMIN — Medication 1 CAPSULE: at 09:32

## 2020-01-01 RX ADMIN — HYDRALAZINE HYDROCHLORIDE 100 MG: 50 TABLET, FILM COATED ORAL at 16:12

## 2020-01-01 RX ADMIN — FUROSEMIDE 40 MG: 10 INJECTION, SOLUTION INTRAMUSCULAR; INTRAVENOUS at 16:30

## 2020-01-01 RX ADMIN — CARBIDOPA AND LEVODOPA 2 TABLET: 25; 100 TABLET ORAL at 22:32

## 2020-01-01 RX ADMIN — DEXTROSE MONOHYDRATE, SODIUM CHLORIDE, AND POTASSIUM CHLORIDE 75 ML/HR: 50; 4.5; 1.49 INJECTION, SOLUTION INTRAVENOUS at 05:41

## 2020-01-01 RX ADMIN — FUROSEMIDE 40 MG: 10 INJECTION, SOLUTION INTRAMUSCULAR; INTRAVENOUS at 08:01

## 2020-01-01 RX ADMIN — ALBUTEROL SULFATE 2.5 MG: 2.5 SOLUTION RESPIRATORY (INHALATION) at 03:09

## 2020-01-01 RX ADMIN — Medication 1 CAPSULE: at 08:58

## 2020-01-01 RX ADMIN — HYDRALAZINE HYDROCHLORIDE 50 MG: 50 TABLET, FILM COATED ORAL at 22:33

## 2020-01-01 RX ADMIN — DOCUSATE SODIUM 100 MG: 50 LIQUID ORAL at 08:05

## 2020-01-01 RX ADMIN — Medication 10 ML: at 13:42

## 2020-01-01 RX ADMIN — CHLORHEXIDINE GLUCONATE 15 ML: 0.12 RINSE ORAL at 08:16

## 2020-01-01 RX ADMIN — Medication 1 CAPSULE: at 13:07

## 2020-01-01 RX ADMIN — CARVEDILOL 6.25 MG: 6.25 TABLET, FILM COATED ORAL at 08:01

## 2020-01-01 RX ADMIN — Medication 10 ML: at 13:08

## 2020-01-01 RX ADMIN — Medication 10 ML: at 21:55

## 2020-01-01 RX ADMIN — CARBIDOPA AND LEVODOPA 2 TABLET: 25; 100 TABLET ORAL at 17:48

## 2020-01-01 RX ADMIN — CEFEPIME HYDROCHLORIDE 2 G: 2 INJECTION, POWDER, FOR SOLUTION INTRAVENOUS at 12:30

## 2020-01-01 RX ADMIN — ALBUTEROL SULFATE 2.5 MG: 2.5 SOLUTION RESPIRATORY (INHALATION) at 15:55

## 2020-01-01 RX ADMIN — Medication 10 ML: at 05:41

## 2020-01-01 RX ADMIN — HYDRALAZINE HYDROCHLORIDE 50 MG: 50 TABLET, FILM COATED ORAL at 17:30

## 2020-01-01 RX ADMIN — CEFEPIME HYDROCHLORIDE 2 G: 2 INJECTION, POWDER, FOR SOLUTION INTRAVENOUS at 11:31

## 2020-01-01 RX ADMIN — HYDRALAZINE HYDROCHLORIDE 50 MG: 50 TABLET, FILM COATED ORAL at 23:15

## 2020-01-01 RX ADMIN — CARVEDILOL 3.12 MG: 3.12 TABLET, FILM COATED ORAL at 17:30

## 2020-01-01 RX ADMIN — DEXTROSE MONOHYDRATE 25 G: 25 INJECTION, SOLUTION INTRAVENOUS at 11:00

## 2020-01-01 RX ADMIN — HYDRALAZINE HYDROCHLORIDE 50 MG: 50 TABLET, FILM COATED ORAL at 08:03

## 2020-01-01 RX ADMIN — FUROSEMIDE 40 MG: 40 TABLET ORAL at 10:00

## 2020-01-01 RX ADMIN — CASTOR OIL AND BALSAM, PERU: 788; 87 OINTMENT TOPICAL at 18:27

## 2020-01-01 RX ADMIN — DOCUSATE SODIUM 100 MG: 50 LIQUID ORAL at 08:10

## 2020-01-01 RX ADMIN — CARBIDOPA AND LEVODOPA 2 TABLET: 25; 100 TABLET ORAL at 13:41

## 2020-01-01 RX ADMIN — DEXTROSE MONOHYDRATE 125 ML: 10 INJECTION, SOLUTION INTRAVENOUS at 11:33

## 2020-01-01 RX ADMIN — AMLODIPINE BESYLATE 10 MG: 5 TABLET ORAL at 08:01

## 2020-01-01 RX ADMIN — HYDRALAZINE HYDROCHLORIDE 50 MG: 50 TABLET, FILM COATED ORAL at 16:04

## 2020-01-01 RX ADMIN — HYDRALAZINE HYDROCHLORIDE 100 MG: 50 TABLET, FILM COATED ORAL at 22:24

## 2020-01-01 RX ADMIN — DEXTROSE MONOHYDRATE 100 ML/HR: 5 INJECTION, SOLUTION INTRAVENOUS at 03:59

## 2020-01-01 RX ADMIN — HEPARIN SODIUM 5000 UNITS: 5000 INJECTION INTRAVENOUS; SUBCUTANEOUS at 17:06

## 2020-01-01 RX ADMIN — CASTOR OIL AND BALSAM, PERU: 788; 87 OINTMENT TOPICAL at 13:34

## 2020-01-01 RX ADMIN — HYDRALAZINE HYDROCHLORIDE 10 MG: 20 INJECTION INTRAMUSCULAR; INTRAVENOUS at 01:37

## 2020-01-01 RX ADMIN — HYDRALAZINE HYDROCHLORIDE 100 MG: 50 TABLET, FILM COATED ORAL at 17:50

## 2020-01-01 RX ADMIN — SODIUM CHLORIDE 1000 ML: 900 INJECTION, SOLUTION INTRAVENOUS at 00:03

## 2020-01-01 RX ADMIN — DEXTROSE MONOHYDRATE 125 ML: 10 INJECTION, SOLUTION INTRAVENOUS at 21:43

## 2020-01-01 RX ADMIN — CARBIDOPA AND LEVODOPA 2 TABLET: 25; 100 TABLET ORAL at 08:10

## 2020-01-01 RX ADMIN — CARBIDOPA AND LEVODOPA 2 TABLET: 25; 100 TABLET ORAL at 18:05

## 2020-01-01 RX ADMIN — PREDNISONE 30 MG: 10 TABLET ORAL at 08:01

## 2020-01-01 RX ADMIN — CARBIDOPA AND LEVODOPA 2 TABLET: 25; 100 TABLET ORAL at 14:09

## 2020-01-01 RX ADMIN — HYDRALAZINE HYDROCHLORIDE 100 MG: 50 TABLET, FILM COATED ORAL at 08:22

## 2020-01-01 RX ADMIN — DEXTROSE MONOHYDRATE 125 ML: 10 INJECTION, SOLUTION INTRAVENOUS at 05:42

## 2020-01-01 RX ADMIN — CARBIDOPA AND LEVODOPA 2 TABLET: 25; 100 TABLET ORAL at 10:00

## 2020-01-01 RX ADMIN — HEPARIN SODIUM 5000 UNITS: 5000 INJECTION INTRAVENOUS; SUBCUTANEOUS at 01:28

## 2020-01-01 RX ADMIN — CARBIDOPA AND LEVODOPA 2 TABLET: 25; 100 TABLET ORAL at 08:22

## 2020-01-01 RX ADMIN — CARBIDOPA AND LEVODOPA 2 TABLET: 25; 100 TABLET ORAL at 23:15

## 2020-01-01 RX ADMIN — CARVEDILOL 6.25 MG: 6.25 TABLET, FILM COATED ORAL at 16:12

## 2020-01-01 RX ADMIN — FUROSEMIDE 20 MG: 10 INJECTION, SOLUTION INTRAMUSCULAR; INTRAVENOUS at 06:15

## 2020-01-01 RX ADMIN — CARBIDOPA AND LEVODOPA 2 TABLET: 25; 100 TABLET ORAL at 18:44

## 2020-01-01 RX ADMIN — DEXTROSE MONOHYDRATE, SODIUM CHLORIDE, AND POTASSIUM CHLORIDE 75 ML/HR: 50; 4.5; 1.49 INJECTION, SOLUTION INTRAVENOUS at 16:16

## 2020-01-01 RX ADMIN — Medication 10 ML: at 00:01

## 2020-01-01 RX ADMIN — MORPHINE SULFATE 2 MG: 2 INJECTION, SOLUTION INTRAMUSCULAR; INTRAVENOUS at 08:20

## 2020-01-01 RX ADMIN — CASTOR OIL AND BALSAM, PERU: 788; 87 OINTMENT TOPICAL at 06:39

## 2020-01-01 RX ADMIN — ALTEPLASE 1 MG: 2.2 INJECTION, POWDER, LYOPHILIZED, FOR SOLUTION INTRAVENOUS at 16:30

## 2020-01-01 RX ADMIN — CARBIDOPA AND LEVODOPA 2 TABLET: 25; 100 TABLET ORAL at 13:01

## 2020-01-01 RX ADMIN — CASTOR OIL AND BALSAM, PERU: 788; 87 OINTMENT TOPICAL at 06:15

## 2020-01-01 RX ADMIN — ALBUTEROL SULFATE 2.5 MG: 2.5 SOLUTION RESPIRATORY (INHALATION) at 12:21

## 2020-01-01 RX ADMIN — Medication 1 CAPSULE: at 10:00

## 2020-01-01 RX ADMIN — Medication 10 ML: at 23:15

## 2020-01-01 RX ADMIN — ALBUTEROL SULFATE 2.5 MG: 2.5 SOLUTION RESPIRATORY (INHALATION) at 13:34

## 2020-01-01 RX ADMIN — FAMOTIDINE 20 MG: 10 INJECTION, SOLUTION INTRAVENOUS at 16:20

## 2020-01-01 RX ADMIN — HEPARIN SODIUM 5000 UNITS: 5000 INJECTION INTRAVENOUS; SUBCUTANEOUS at 09:33

## 2020-01-01 RX ADMIN — CARVEDILOL 6.25 MG: 6.25 TABLET, FILM COATED ORAL at 09:32

## 2020-01-01 RX ADMIN — HEPARIN SODIUM 5000 UNITS: 5000 INJECTION INTRAVENOUS; SUBCUTANEOUS at 00:20

## 2020-01-01 RX ADMIN — DEXTROSE MONOHYDRATE 25 G: 25 INJECTION, SOLUTION INTRAVENOUS at 10:18

## 2020-01-01 RX ADMIN — HYDRALAZINE HYDROCHLORIDE 100 MG: 50 TABLET, FILM COATED ORAL at 23:15

## 2020-01-01 RX ADMIN — DEXTROSE MONOHYDRATE 250 ML: 10 INJECTION, SOLUTION INTRAVENOUS at 06:55

## 2020-01-01 RX ADMIN — LEVOFLOXACIN 500 MG: 5 INJECTION, SOLUTION INTRAVENOUS at 23:21

## 2020-01-01 RX ADMIN — CARVEDILOL 6.25 MG: 6.25 TABLET, FILM COATED ORAL at 16:33

## 2020-01-01 RX ADMIN — ALBUTEROL SULFATE 2.5 MG: 2.5 SOLUTION RESPIRATORY (INHALATION) at 07:20

## 2020-01-01 RX ADMIN — CARVEDILOL 3.12 MG: 3.12 TABLET, FILM COATED ORAL at 09:37

## 2020-01-01 RX ADMIN — ALBUTEROL SULFATE 2.5 MG: 2.5 SOLUTION RESPIRATORY (INHALATION) at 06:51

## 2020-01-01 RX ADMIN — CHLORHEXIDINE GLUCONATE 15 ML: 0.12 RINSE ORAL at 09:00

## 2020-01-01 RX ADMIN — Medication 10 ML: at 05:26

## 2020-01-01 RX ADMIN — CASTOR OIL AND BALSAM, PERU: 788; 87 OINTMENT TOPICAL at 05:19

## 2020-01-01 RX ADMIN — CASTOR OIL AND BALSAM, PERU: 788; 87 OINTMENT TOPICAL at 14:00

## 2020-01-01 RX ADMIN — HYDRALAZINE HYDROCHLORIDE 100 MG: 50 TABLET, FILM COATED ORAL at 10:00

## 2020-01-01 RX ADMIN — HYDRALAZINE HYDROCHLORIDE 10 MG: 20 INJECTION INTRAMUSCULAR; INTRAVENOUS at 19:00

## 2020-01-01 RX ADMIN — ALBUTEROL SULFATE 2.5 MG: 2.5 SOLUTION RESPIRATORY (INHALATION) at 23:16

## 2020-01-01 RX ADMIN — CARBIDOPA AND LEVODOPA 2 TABLET: 25; 100 TABLET ORAL at 09:14

## 2020-01-01 RX ADMIN — HYDRALAZINE HYDROCHLORIDE 100 MG: 50 TABLET, FILM COATED ORAL at 16:33

## 2020-01-01 RX ADMIN — CARVEDILOL 6.25 MG: 6.25 TABLET, FILM COATED ORAL at 07:01

## 2020-01-01 RX ADMIN — HEPARIN SODIUM 5000 UNITS: 5000 INJECTION INTRAVENOUS; SUBCUTANEOUS at 09:34

## 2020-01-01 RX ADMIN — GLYCOPYRROLATE 0.1 MG: 0.2 INJECTION, SOLUTION INTRAMUSCULAR; INTRAVENOUS at 08:20

## 2020-01-01 RX ADMIN — Medication 10 ML: at 07:02

## 2020-01-01 RX ADMIN — FUROSEMIDE 40 MG: 10 INJECTION, SOLUTION INTRAMUSCULAR; INTRAVENOUS at 09:52

## 2020-01-01 RX ADMIN — SODIUM CHLORIDE 1000 ML: 900 INJECTION, SOLUTION INTRAVENOUS at 09:41

## 2020-01-01 RX ADMIN — ALBUTEROL SULFATE 2.5 MG: 2.5 SOLUTION RESPIRATORY (INHALATION) at 20:28

## 2020-01-01 RX ADMIN — LOSARTAN POTASSIUM 50 MG: 50 TABLET, FILM COATED ORAL at 08:58

## 2020-01-01 RX ADMIN — FUROSEMIDE 40 MG: 10 INJECTION, SOLUTION INTRAMUSCULAR; INTRAVENOUS at 09:08

## 2020-01-01 RX ADMIN — EPOETIN ALFA-EPBX 10000 UNITS: 10000 INJECTION, SOLUTION INTRAVENOUS; SUBCUTANEOUS at 20:17

## 2020-01-01 RX ADMIN — HYDRALAZINE HYDROCHLORIDE 10 MG: 20 INJECTION INTRAMUSCULAR; INTRAVENOUS at 13:40

## 2020-01-01 RX ADMIN — CARBIDOPA AND LEVODOPA 2 TABLET: 25; 100 TABLET ORAL at 13:00

## 2020-01-01 RX ADMIN — DEXTROSE MONOHYDRATE 125 ML: 10 INJECTION, SOLUTION INTRAVENOUS at 12:25

## 2020-01-01 RX ADMIN — Medication 10 ML: at 21:53

## 2020-01-01 RX ADMIN — LOSARTAN POTASSIUM 50 MG: 50 TABLET, FILM COATED ORAL at 10:00

## 2020-01-01 RX ADMIN — SODIUM CHLORIDE SOLN NEBU 3% 4 ML: 3 NEBU SOLN at 23:35

## 2020-01-01 RX ADMIN — CARBIDOPA AND LEVODOPA 2 TABLET: 25; 100 TABLET ORAL at 14:05

## 2020-01-01 RX ADMIN — CARBIDOPA AND LEVODOPA 2 TABLET: 25; 100 TABLET ORAL at 22:24

## 2020-01-01 RX ADMIN — HEPARIN SODIUM 5000 UNITS: 5000 INJECTION INTRAVENOUS; SUBCUTANEOUS at 08:05

## 2020-01-01 RX ADMIN — Medication 10 ML: at 22:33

## 2020-01-01 RX ADMIN — Medication 10 ML: at 12:57

## 2020-01-01 RX ADMIN — HEPARIN SODIUM 5000 UNITS: 5000 INJECTION INTRAVENOUS; SUBCUTANEOUS at 16:42

## 2020-01-01 RX ADMIN — HYDRALAZINE HYDROCHLORIDE 10 MG: 20 INJECTION INTRAMUSCULAR; INTRAVENOUS at 07:08

## 2020-01-01 RX ADMIN — CASTOR OIL AND BALSAM, PERU: 788; 87 OINTMENT TOPICAL at 21:54

## 2020-01-01 RX ADMIN — HYDRALAZINE HYDROCHLORIDE 100 MG: 50 TABLET, FILM COATED ORAL at 21:53

## 2020-01-01 RX ADMIN — CARBIDOPA AND LEVODOPA 2 TABLET: 25; 100 TABLET ORAL at 08:03

## 2020-01-01 RX ADMIN — SODIUM CHLORIDE 125 ML/HR: 900 INJECTION, SOLUTION INTRAVENOUS at 22:33

## 2020-01-01 RX ADMIN — CARBIDOPA AND LEVODOPA 2 TABLET: 25; 100 TABLET ORAL at 08:01

## 2020-01-01 RX ADMIN — PREDNISONE 30 MG: 10 TABLET ORAL at 17:30

## 2020-01-01 RX ADMIN — CARBIDOPA AND LEVODOPA 2 TABLET: 25; 100 TABLET ORAL at 18:56

## 2020-01-01 RX ADMIN — CARBIDOPA AND LEVODOPA 2 TABLET: 25; 100 TABLET ORAL at 15:40

## 2020-01-01 RX ADMIN — LEVOFLOXACIN 500 MG: 500 TABLET, FILM COATED ORAL at 22:50

## 2020-01-01 RX ADMIN — FUROSEMIDE 40 MG: 40 TABLET ORAL at 08:59

## 2020-01-01 RX ADMIN — HEPARIN SODIUM 5000 UNITS: 5000 INJECTION INTRAVENOUS; SUBCUTANEOUS at 09:14

## 2020-01-01 RX ADMIN — CARBIDOPA AND LEVODOPA 2 TABLET: 25; 100 TABLET ORAL at 09:34

## 2020-01-01 RX ADMIN — AMLODIPINE BESYLATE 10 MG: 5 TABLET ORAL at 08:58

## 2020-01-01 RX ADMIN — CASTOR OIL AND BALSAM, PERU: 788; 87 OINTMENT TOPICAL at 21:59

## 2020-01-01 RX ADMIN — CHLORHEXIDINE GLUCONATE 15 ML: 0.12 RINSE ORAL at 09:14

## 2020-01-01 RX ADMIN — CARBIDOPA AND LEVODOPA 2 TABLET: 25; 100 TABLET ORAL at 19:33

## 2020-01-01 RX ADMIN — CARBIDOPA AND LEVODOPA 2 TABLET: 25; 100 TABLET ORAL at 22:27

## 2020-01-01 RX ADMIN — HYDRALAZINE HYDROCHLORIDE 100 MG: 50 TABLET, FILM COATED ORAL at 17:16

## 2020-01-01 RX ADMIN — CARVEDILOL 3.12 MG: 3.12 TABLET, FILM COATED ORAL at 08:03

## 2020-01-01 RX ADMIN — Medication 10 ML: at 21:40

## 2020-01-01 RX ADMIN — DOCUSATE SODIUM 100 MG: 50 LIQUID ORAL at 09:34

## 2020-01-01 RX ADMIN — HYDRALAZINE HYDROCHLORIDE 10 MG: 20 INJECTION INTRAMUSCULAR; INTRAVENOUS at 02:42

## 2020-01-01 RX ADMIN — CARBIDOPA AND LEVODOPA 2 TABLET: 25; 100 TABLET ORAL at 08:59

## 2020-01-01 RX ADMIN — Medication 10 ML: at 21:59

## 2020-01-01 RX ADMIN — DOCUSATE SODIUM 100 MG: 50 LIQUID ORAL at 12:44

## 2020-01-01 RX ADMIN — AMLODIPINE BESYLATE 10 MG: 5 TABLET ORAL at 10:00

## 2020-01-01 RX ADMIN — CARBIDOPA AND LEVODOPA 2 TABLET: 25; 100 TABLET ORAL at 09:36

## 2020-01-01 RX ADMIN — DEXTROSE MONOHYDRATE 250 ML: 10 INJECTION, SOLUTION INTRAVENOUS at 17:07

## 2020-01-01 RX ADMIN — HEPARIN SODIUM 5000 UNITS: 5000 INJECTION INTRAVENOUS; SUBCUTANEOUS at 02:38

## 2020-01-01 RX ADMIN — LORAZEPAM 1 MG: 2 INJECTION INTRAMUSCULAR; INTRAVENOUS at 08:54

## 2020-01-01 RX ADMIN — HYDRALAZINE HYDROCHLORIDE 100 MG: 50 TABLET, FILM COATED ORAL at 08:01

## 2020-01-01 RX ADMIN — CHLORHEXIDINE GLUCONATE 15 ML: 0.12 RINSE ORAL at 20:09

## 2020-01-01 RX ADMIN — CARBIDOPA AND LEVODOPA 2 TABLET: 25; 100 TABLET ORAL at 13:07

## 2020-01-01 RX ADMIN — HYDRALAZINE HYDROCHLORIDE 100 MG: 50 TABLET, FILM COATED ORAL at 15:15

## 2020-01-01 RX ADMIN — SODIUM CHLORIDE SOLN NEBU 3% 4 ML: 3 NEBU SOLN at 05:35

## 2020-01-01 RX ADMIN — Medication 10 ML: at 22:25

## 2020-01-01 RX ADMIN — ETOMIDATE 20 MG: 2 INJECTION, SOLUTION INTRAVENOUS at 08:36

## 2020-01-01 RX ADMIN — CASTOR OIL AND BALSAM, PERU: 788; 87 OINTMENT TOPICAL at 21:18

## 2020-01-01 RX ADMIN — Medication 10 ML: at 05:01

## 2020-01-01 RX ADMIN — CARVEDILOL 6.25 MG: 6.25 TABLET, FILM COATED ORAL at 17:16

## 2020-01-01 RX ADMIN — SODIUM CHLORIDE 125 ML/HR: 900 INJECTION, SOLUTION INTRAVENOUS at 19:14

## 2020-01-01 RX ADMIN — MORPHINE SULFATE 2 MG: 2 INJECTION, SOLUTION INTRAMUSCULAR; INTRAVENOUS at 08:54

## 2020-01-01 RX ADMIN — CARBIDOPA AND LEVODOPA 2 TABLET: 25; 100 TABLET ORAL at 17:59

## 2020-01-01 RX ADMIN — SODIUM CHLORIDE, SODIUM LACTATE, POTASSIUM CHLORIDE, CALCIUM CHLORIDE, AND DEXTROSE MONOHYDRATE 50 ML/HR: 600; 310; 30; 20; 5 INJECTION, SOLUTION INTRAVENOUS at 12:51

## 2020-01-01 RX ADMIN — PREDNISONE 30 MG: 10 TABLET ORAL at 09:36

## 2020-01-01 RX ADMIN — LOVASTATIN 20 MG: 20 TABLET ORAL at 08:10

## 2020-01-01 RX ADMIN — CASTOR OIL AND BALSAM, PERU: 788; 87 OINTMENT TOPICAL at 22:28

## 2020-01-01 RX ADMIN — CASTOR OIL AND BALSAM, PERU: 788; 87 OINTMENT TOPICAL at 05:42

## 2020-01-01 RX ADMIN — CASTOR OIL AND BALSAM, PERU: 788; 87 OINTMENT TOPICAL at 08:28

## 2020-01-01 RX ADMIN — CHLORHEXIDINE GLUCONATE 15 ML: 0.12 RINSE ORAL at 09:24

## 2020-01-01 RX ADMIN — HYDRALAZINE HYDROCHLORIDE 50 MG: 50 TABLET, FILM COATED ORAL at 16:31

## 2020-01-01 RX ADMIN — CARBIDOPA AND LEVODOPA 2 TABLET: 25; 100 TABLET ORAL at 00:00

## 2020-01-01 RX ADMIN — Medication 10 ML: at 13:28

## 2020-01-01 RX ADMIN — CARBIDOPA AND LEVODOPA 2 TABLET: 25; 100 TABLET ORAL at 08:06

## 2020-01-01 RX ADMIN — CARBIDOPA AND LEVODOPA 2 TABLET: 25; 100 TABLET ORAL at 13:52

## 2020-01-01 RX ADMIN — CARBIDOPA AND LEVODOPA 2 TABLET: 25; 100 TABLET ORAL at 13:13

## 2020-01-01 RX ADMIN — PREDNISONE 30 MG: 10 TABLET ORAL at 09:32

## 2020-01-01 RX ADMIN — Medication 1 CAPSULE: at 08:01

## 2020-01-01 RX ADMIN — Medication 10 ML: at 23:18

## 2020-01-01 RX ADMIN — Medication 10 ML: at 13:34

## 2020-01-01 RX ADMIN — Medication 10 ML: at 14:06

## 2020-01-01 RX ADMIN — EPOETIN ALFA-EPBX 10000 UNITS: 10000 INJECTION, SOLUTION INTRAVENOUS; SUBCUTANEOUS at 00:01

## 2020-01-01 RX ADMIN — HYDRALAZINE HYDROCHLORIDE 50 MG: 50 TABLET, FILM COATED ORAL at 23:00

## 2020-01-01 RX ADMIN — IRON SUCROSE 300 MG: 20 INJECTION, SOLUTION INTRAVENOUS at 16:32

## 2020-01-01 RX ADMIN — ALBUTEROL SULFATE 2.5 MG: 2.5 SOLUTION RESPIRATORY (INHALATION) at 02:57

## 2020-01-01 RX ADMIN — Medication 10 ML: at 13:07

## 2020-01-01 RX ADMIN — Medication 10 ML: at 06:39

## 2020-01-01 RX ADMIN — ROCURONIUM BROMIDE 100 MG: 50 INJECTION, SOLUTION INTRAVENOUS at 08:37

## 2020-01-01 RX ADMIN — DEXTROSE MONOHYDRATE 125 ML: 10 INJECTION, SOLUTION INTRAVENOUS at 12:36

## 2020-01-01 RX ADMIN — DEXTROSE MONOHYDRATE 125 ML: 10 INJECTION, SOLUTION INTRAVENOUS at 02:46

## 2020-01-01 RX ADMIN — MORPHINE SULFATE 2 MG: 2 INJECTION, SOLUTION INTRAMUSCULAR; INTRAVENOUS at 15:14

## 2020-01-01 RX ADMIN — ALBUTEROL SULFATE 2.5 MG: 2.5 SOLUTION RESPIRATORY (INHALATION) at 00:05

## 2020-01-01 RX ADMIN — CEFEPIME HYDROCHLORIDE 2 G: 2 INJECTION, POWDER, FOR SOLUTION INTRAVENOUS at 11:22

## 2020-01-01 RX ADMIN — CHLORHEXIDINE GLUCONATE 15 ML: 0.12 RINSE ORAL at 20:28

## 2020-01-01 RX ADMIN — DEXTROSE 50 % IN WATER (D50W) INTRAVENOUS SYRINGE 25 G: at 08:10

## 2020-01-01 RX ADMIN — Medication 10 ML: at 05:30

## 2020-01-01 RX ADMIN — Medication 10 ML: at 13:18

## 2020-01-01 RX ADMIN — LOSARTAN POTASSIUM 50 MG: 50 TABLET, FILM COATED ORAL at 08:22

## 2020-01-01 RX ADMIN — Medication 10 ML: at 22:28

## 2020-01-01 RX ADMIN — CARBIDOPA AND LEVODOPA 2 TABLET: 25; 100 TABLET ORAL at 23:00

## 2020-01-01 RX ADMIN — CARVEDILOL 3.12 MG: 3.12 TABLET, FILM COATED ORAL at 16:04

## 2020-01-01 RX ADMIN — CARBIDOPA AND LEVODOPA 2 TABLET: 25; 100 TABLET ORAL at 17:11

## 2020-01-01 RX ADMIN — DEXTROSE MONOHYDRATE 25 G: 25 INJECTION, SOLUTION INTRAVENOUS at 08:10

## 2020-01-01 RX ADMIN — CARBIDOPA AND LEVODOPA 2 TABLET: 25; 100 TABLET ORAL at 17:30

## 2020-01-01 RX ADMIN — HYDRALAZINE HYDROCHLORIDE 100 MG: 50 TABLET, FILM COATED ORAL at 08:58

## 2020-01-01 RX ADMIN — HEPARIN SODIUM 5000 UNITS: 5000 INJECTION INTRAVENOUS; SUBCUTANEOUS at 02:29

## 2020-01-01 RX ADMIN — CARBIDOPA AND LEVODOPA 2 TABLET: 25; 100 TABLET ORAL at 17:33

## 2020-01-01 RX ADMIN — HEPARIN SODIUM 5000 UNITS: 5000 INJECTION INTRAVENOUS; SUBCUTANEOUS at 01:07

## 2020-01-01 RX ADMIN — CARBIDOPA AND LEVODOPA 2 TABLET: 25; 100 TABLET ORAL at 17:16

## 2020-01-01 RX ADMIN — CHLORHEXIDINE GLUCONATE 15 ML: 0.12 RINSE ORAL at 20:17

## 2020-01-01 RX ADMIN — Medication 10 ML: at 07:27

## 2020-01-01 RX ADMIN — DEXTROSE MONOHYDRATE 125 ML: 10 INJECTION, SOLUTION INTRAVENOUS at 09:29

## 2020-01-01 RX ADMIN — Medication 10 ML: at 05:21

## 2020-01-01 RX ADMIN — HYDRALAZINE HYDROCHLORIDE 10 MG: 20 INJECTION INTRAMUSCULAR; INTRAVENOUS at 05:01

## 2020-01-01 RX ADMIN — HYDRALAZINE HYDROCHLORIDE 100 MG: 50 TABLET, FILM COATED ORAL at 22:29

## 2020-01-01 RX ADMIN — CASTOR OIL AND BALSAM, PERU: 788; 87 OINTMENT TOPICAL at 16:46

## 2020-01-01 RX ADMIN — DEXTROSE MONOHYDRATE 125 ML: 10 INJECTION, SOLUTION INTRAVENOUS at 23:48

## 2020-01-01 RX ADMIN — HEPARIN SODIUM 5000 UNITS: 5000 INJECTION INTRAVENOUS; SUBCUTANEOUS at 16:25

## 2020-01-01 RX ADMIN — CARBIDOPA AND LEVODOPA 2 TABLET: 25; 100 TABLET ORAL at 17:01

## 2020-01-01 RX ADMIN — LOSARTAN POTASSIUM 50 MG: 50 TABLET, FILM COATED ORAL at 08:01

## 2020-01-01 RX ADMIN — CARBIDOPA AND LEVODOPA 2 TABLET: 25; 100 TABLET ORAL at 22:34

## 2020-01-01 RX ADMIN — CARVEDILOL 6.25 MG: 6.25 TABLET, FILM COATED ORAL at 16:17

## 2020-01-01 RX ADMIN — EPOETIN ALFA-EPBX 10000 UNITS: 10000 INJECTION, SOLUTION INTRAVENOUS; SUBCUTANEOUS at 20:20

## 2020-01-01 RX ADMIN — HEPARIN SODIUM 5000 UNITS: 5000 INJECTION INTRAVENOUS; SUBCUTANEOUS at 17:51

## 2020-01-01 RX ADMIN — Medication 10 ML: at 05:40

## 2020-01-01 RX ADMIN — Medication 10 ML: at 09:49

## 2020-01-01 RX ADMIN — LOVASTATIN 20 MG: 20 TABLET ORAL at 08:06

## 2020-01-01 RX ADMIN — HEPARIN SODIUM 5000 UNITS: 5000 INJECTION INTRAVENOUS; SUBCUTANEOUS at 16:20

## 2020-01-01 RX ADMIN — CARBIDOPA AND LEVODOPA 2 TABLET: 25; 100 TABLET ORAL at 21:59

## 2020-01-01 RX ADMIN — Medication 10 ML: at 22:34

## 2020-01-01 RX ADMIN — Medication 10 ML: at 07:10

## 2020-01-01 RX ADMIN — MAGNESIUM SULFATE HEPTAHYDRATE 2 G: 40 INJECTION, SOLUTION INTRAVENOUS at 18:05

## 2020-01-01 RX ADMIN — DEXTROSE MONOHYDRATE, SODIUM CHLORIDE, AND POTASSIUM CHLORIDE 75 ML/HR: 50; 4.5; 1.49 INJECTION, SOLUTION INTRAVENOUS at 02:44

## 2020-01-01 RX ADMIN — AMLODIPINE BESYLATE 10 MG: 5 TABLET ORAL at 08:22

## 2020-01-01 RX ADMIN — DEXTROSE MONOHYDRATE 25 G: 25 INJECTION, SOLUTION INTRAVENOUS at 08:00

## 2020-01-01 RX ADMIN — LEVOFLOXACIN 750 MG: 5 INJECTION, SOLUTION INTRAVENOUS at 00:04

## 2020-01-01 RX ADMIN — CHLORHEXIDINE GLUCONATE 15 ML: 0.12 RINSE ORAL at 21:11

## 2020-01-01 RX ADMIN — IRON SUCROSE 300 MG: 20 INJECTION, SOLUTION INTRAVENOUS at 15:12

## 2020-01-01 RX ADMIN — PREDNISONE 30 MG: 10 TABLET ORAL at 08:03

## 2020-01-01 RX ADMIN — FAMOTIDINE 20 MG: 10 INJECTION, SOLUTION INTRAVENOUS at 17:06

## 2020-01-01 RX ADMIN — FUROSEMIDE 40 MG: 10 INJECTION, SOLUTION INTRAMUSCULAR; INTRAVENOUS at 11:59

## 2020-01-01 RX ADMIN — CHLORHEXIDINE GLUCONATE 15 ML: 0.12 RINSE ORAL at 20:59

## 2020-01-01 RX ADMIN — DOCUSATE SODIUM 100 MG: 50 LIQUID ORAL at 09:14

## 2020-01-01 RX ADMIN — LOVASTATIN 20 MG: 20 TABLET ORAL at 09:34

## 2020-01-01 NOTE — ED TRIAGE NOTES
Pt arrives via EMS from facility for decreased O2 sats. Pt presents to department with)2 sats 93-96% on RA. Pt recently dx with pneumonia.

## 2020-01-01 NOTE — ROUTINE PROCESS
TRANSFER - OUT REPORT:    Verbal report given to [facility], RN(name) on Ant Bueno  being transferred to [facility](unit) for routine progression of care       Report consisted of patients Situation, Background, Assessment and   Recommendations(SBAR). Information from the following report(s) SBAR was reviewed with the receiving nurse. Lines:   Venous Access Device Port (Active)   Date Accessed (Medial Site) 12/31/19 12/31/2019 11:19 PM   Access Time (Medial Site) 2310 12/31/2019 11:19 PM   Access Needle Size (Site #1) 20 G 12/31/2019 11:19 PM   Access Needle Length (Medial Site) 1 inch 12/31/2019 11:19 PM   Positive Blood Return (Medial Site) Yes 12/31/2019 11:19 PM   Action Taken (Medial Site) Blood drawn 12/31/2019 11:19 PM       Peripheral IV 01/01/20 Right Hand (Active)   Site Assessment Clean, dry, & intact 1/1/2020  4:13 AM   Phlebitis Assessment 0 1/1/2020  4:13 AM   Infiltration Assessment 0 1/1/2020  4:13 AM   Dressing Status Clean, dry, & intact 1/1/2020  4:13 AM   Dressing Type Elastic bandage 1/1/2020  4:13 AM        Opportunity for questions and clarification was provided.       Patient transported with:  Transportation

## 2020-01-01 NOTE — PROGRESS NOTES
Day #1 of Levaquin  Indication:  HAP  Current regimen:  500 mg Q24 x 7 days    Recent Labs     20  0311 19  2312   WBC 3.5* 4.0   CREA 2.11* 2.16*   BUN 58* 56*     Temp (24hrs), Av.1 °F (36.2 °C), Min:94.2 °F (34.6 °C), Max:97.9 °F (36.6 °C)    Est CrCl: 15 ml/min (CKD)    Plan: Change to Levaquin  mg x 1 dose, then 500 mg Q 48 hours per [facility] P&T approved renal dosing protocol. Pharmacy will monitor daily and will make adjustments as necessary for changes in renal function.

## 2020-01-01 NOTE — PROGRESS NOTES
Problem: Pressure Injury - Risk of  Goal: *Prevention of pressure injury  Description  Document Chester Scale and appropriate interventions in the flowsheet. Outcome: Progressing Towards Goal  Note:   Patient placed on turn team with q2 turns and incontinence check. Pressure Injury Interventions:  Sensory Interventions: Assess changes in LOC, Assess need for specialty bed, Avoid rigorous massage over bony prominences, Check visual cues for pain, Discuss PT/OT consult with provider, Keep linens dry and wrinkle-free, Maintain/enhance activity level, Minimize linen layers, Monitor skin under medical devices, Pressure redistribution bed/mattress (bed type), Turn and reposition approx. every two hours (pillows and wedges if needed)    Moisture Interventions: Absorbent underpads, Apply protective barrier, creams and emollients, Assess need for specialty bed, Check for incontinence Q2 hours and as needed, Contain wound drainage, Internal/External urinary devices, Limit adult briefs, Maintain skin hydration (lotion/cream), Minimize layers, Moisture barrier    Activity Interventions: Assess need for specialty bed, Increase time out of bed, Pressure redistribution bed/mattress(bed type), PT/OT evaluation    Mobility Interventions: Assess need for specialty bed, HOB 30 degrees or less, Pressure redistribution bed/mattress (bed type), PT/OT evaluation, Turn and reposition approx.  every two hours(pillow and wedges), Float heels    Nutrition Interventions: Document food/fluid/supplement intake, Discuss nutritional consult with provider, Offer support with meals,snacks and hydration    Friction and Shear Interventions: Apply protective barrier, creams and emollients, Feet elevated on foot rest, HOB 30 degrees or less, Lift sheet, Lift team/patient mobility team, Minimize layers, Transferring/repositioning devices                Problem: Airway Clearance - Ineffective  Goal: *Patent airway  Outcome: Progressing Towards Goal  Goal: *Able to cough effectively  Outcome: Progressing Towards Goal     Problem: Breathing Pattern - Ineffective  Goal: *Absence of hypoxia  Outcome: Progressing Towards Goal     Problem: Falls - Risk of  Goal: *Absence of Falls  Description  Document Yosef Deondre Fall Risk and appropriate interventions in the flowsheet. Outcome: Progressing Towards Goal  Note:   Patient has not fallen during shift. Call bell within reach. Tray table within reach. Bed in lowest position. Non-skid socks on patient's feet when ambulating. Appropriate lighting in room.     Fall Risk Interventions:       Mentation Interventions: Adequate sleep, hydration, pain control, Bed/chair exit alarm, Door open when patient unattended, Evaluate medications/consider consulting pharmacy, Eyeglasses and hearing aids, Familiar objects from home, HELP ([address]) if available, Increase mobility, More frequent rounding, Reorient patient, Room close to nurse's station, Update white board    Medication Interventions: Evaluate medications/consider consulting pharmacy, Patient to call before getting OOB, Teach patient to arise slowly, Utilize gait belt for transfers/ambulation, Bed/chair exit alarm    Elimination Interventions: Bed/chair exit alarm, Call light in reach, Patient to call for help with toileting needs, Toileting schedule/hourly rounds

## 2020-01-01 NOTE — ED PROVIDER NOTES
HPI     80-year-old female with a history of chronic kidney disease, dementia, hypertension, cholesterol, Parkinson's disease, thyroid disease, anemia, arthritis, presents to the emergency department with increasing shortness of breath and hypoxia. Reportedly she was diagnosed with pneumonia yesterday at the nursing home and started on doxycycline. Today the nursing home noted her oxygen levels were low so they sent her in. Patient reports she feels terrible. She denies chest pain. She denies fever.     Past Medical History:   Diagnosis Date    Anemia     Arthritis     Chronic kidney disease     RENAL MASS    Dementia with Lewy bodies 03/01/2017    Environmental allergies     HTN (hypertension) 1/19/2012    Hyperlipemia 1/19/2012    Hypertension     Other ill-defined conditions(799.89)     parkinson disease    Parkinson disease ([facility] [address] 1/19/2012    S/P radioactive iodine thyroid ablation 1/19/2012    S/P NINI-BSO 1/19/2012    S/P TKR (total knee replacement) 1/19/2012    Thyroid disease     goiter/hyperthyroid (s/p iodine tx 1999)    Unspecified adverse effect of anesthesia     CLAUSTROPHOBIA       Past Surgical History:   Procedure Laterality Date    ENDOSCOPY, COLON, DIAGNOSTIC  7/2011    ([facility])    HX GI      COLONOSCOPY    HX ORTHOPAEDIC      r hip, l knee replacement 2008    HX NINI AND BSO  1946         Family History:   Problem Relation Age of Onset    Coronary Artery Disease Father         s/p MI    Heart Disease Father     Elevated Lipids Father     Cancer Brother         hodgkins    Anesth Problems Neg Hx        Social History     Socioeconomic History    Marital status:      Spouse name: Not on file    Number of children: Not on file    Years of education: Not on file    Highest education level: Not on file   Occupational History    Not on file   Social Needs    Financial resource strain: Not on file    Food insecurity:     Worry: Not on file     Inability: Not on file   [employer] needs:     Medical: Not on file     Non-medical: Not on file   Tobacco Use    Smoking status: Never Smoker    Smokeless tobacco: Never Used   Substance and Sexual Activity    Alcohol use: No     Alcohol/week: 0.0 standard drinks    Drug use: No    Sexual activity: Never   Lifestyle    Physical activity:     Days per week: Not on file     Minutes per session: Not on file    Stress: Not on file   Relationships    Social connections:     Talks on phone: Not on file     Gets together: Not on file     Attends Mandaeism service: Not on file     Active member of club or organization: Not on file     Attends meetings of clubs or organizations: Not on file     Relationship status: Not on file    Intimate partner violence:     Fear of current or ex partner: Not on file     Emotionally abused: Not on file     Physically abused: Not on file     Forced sexual activity: Not on file   Other Topics Concern     Service Not Asked    Blood Transfusions Not Asked    Caffeine Concern Not Asked    Occupational Exposure Not Asked   [place] Hazards Not Asked    Sleep Concern Not Asked    Stress Concern Not Asked    Weight Concern Not Asked    Special Diet Not Asked    Back Care Not Asked    Exercise Not Asked    Bike Helmet Not Asked   [address] Not Asked    Self-Exams Not Asked   Social History Narrative    Not on file         ALLERGIES: Bactroban [mupirocin calcium]; Betadine [povidone-iodine]; Comtan [entacapone]; Erythromycin; Penicillins; and Sulfa (sulfonamide antibiotics)    Review of Systems   Unable to perform ROS: Dementia       There were no vitals filed for this visit. Physical Exam  Constitutional:       General: She is in acute distress. Appearance: She is well-developed. HENT:      Head: Normocephalic and atraumatic. Mouth/Throat:      Pharynx: No oropharyngeal exudate. Eyes:      General: No scleral icterus. Right eye: No discharge. Left eye: No discharge. Pupils: Pupils are equal, round, and reactive to light. Neck:      Musculoskeletal: Normal range of motion and neck supple. Vascular: No JVD. Cardiovascular:      Rate and Rhythm: Normal rate and regular rhythm. Heart sounds: Normal heart sounds. No murmur. Pulmonary:      Effort: Respiratory distress present. Breath sounds: No stridor. Wheezing and rales present. Chest:      Chest wall: No tenderness. Abdominal:      General: Bowel sounds are normal. There is no distension. Palpations: Abdomen is soft. There is no mass. Tenderness: There is no tenderness. There is no guarding or rebound. Musculoskeletal: Normal range of motion. Right lower leg: Edema present. Left lower leg: Edema present. Skin:     General: Skin is warm and dry. Capillary Refill: Capillary refill takes less than 2 seconds. Findings: No rash. Neurological:      Mental Status: She is oriented to person, place, and time. Psychiatric:         Behavior: Behavior normal.         Thought Content: Thought content normal.         Judgment: Judgment normal.          MDM       Procedures    WBC/lactate WNL, core temp is 94, bilateral pneumonia on cxr,  by labs. Levaquin, fluids ordered. IMproved with neb treatment although stats upper 80's on room air. Hospitalist Perfect Serve for Admission  11:51 PM    ED Room Number: ER10/10  Patient Name and age:  Castillo Burroughs 80 y.o.  female  Working Diagnosis:   1. Hypothermia, initial encounter    2. Pneumonia of both lower lobes due to infectious organism ([address]    3. Acute kidney injury ([facility] [address]      Readmission: no  Isolation Requirements:  no  Recommended Level of Care:  telemetry  Code Status:  Full Code  Department:[facility] Adult ED - ([phone]  Other:  80year old female with CKD, htn, parkinsons, anemia, presents with increased wob and hypoxia. Diagnoses with pneumonia yesterday. Sat's upper 80's on room, better on 2L. Wheezing improved with neb. Jaret by labs. Lactate and WBC are normal. Core temp 94. Levaquin/fluids ordered. Hemodynamically stable.

## 2020-01-01 NOTE — H&P
[address] [facility] Adult  Hospitalist Group  History and Physical    Primary Care Provider: Jennie Rosales MD    Subjective:     Ambar El is a 80 y.o. female past medical history significant for CKD, dementia, Parkinson's disease, anemia and hypertension was brought to the emergency room with increased shortness of breath and hypoxia. Patient coming from a nursing home facility where she was found to be hypoxic. Yesterday she was started having shortness of breath and cough at nursing home. She was diagnosed with pneumonia and started on doxycycline. She took 1 dose of  the antibiotic but her symptoms has worsened. Patient only reports shortness of breath and cough. Denies fever, chills, chest pain, swelling of her lower extremity however history is limited due to her dementia. No family at the bedside. In the emergency room patient was found to be hypothermic and hypoxic. She was treated for pneumonia and started on Levaquin. Review of Systems:    General: HPI, no fever, no changes of weight or sleep  HEENT: no headache, no vision changes, no nose discharge, no hearing changes   RES: Pt reports shortness of breath and cough. CVS: no cp, no palpitation. Muscular: no joint swelling, no muscle pain, no leg swelling  Skin: no rash, no itching   GI: no vomiting, no diarrhea  : no dysuria, no hematuria  Hemo: no gum bleeding, no petechial   Neuro: no weakness.    Endo: no polydipsia   Psych: denied depression     Past Medical History:   Diagnosis Date    Anemia     Arthritis     Chronic kidney disease     RENAL MASS    Dementia with Lewy bodies ([facility] [address] 03/01/2017    Environmental allergies     HTN (hypertension) 1/19/2012    Hyperlipemia 1/19/2012    Hypertension     Other ill-defined conditions(799.89)     parkinson disease    Parkinson disease ([facility] [address] 1/19/2012    S/P radioactive iodine thyroid ablation 1/19/2012    S/P NINI-BSO 1/19/2012    S/P TKR (total knee replacement) 1/19/2012  Thyroid disease     goiter/hyperthyroid (s/p iodine tx 1999)    Unspecified adverse effect of anesthesia     CLAUSTROPHOBIA      Past Surgical History:   Procedure Laterality Date    ENDOSCOPY, COLON, DIAGNOSTIC  7/2011    ([facility]i)    HX GI      COLONOSCOPY    HX ORTHOPAEDIC      r hip, l knee replacement 2008    HX NINI AND BSO  1946     Prior to Admission medications    Medication Sig Start Date End Date Taking? Authorizing Provider   polyethylene glycol (MIRALAX) 17 gram packet Take 17 g by mouth daily. Provider, Historical   sodium polystyrene (KIONEX, WITH SORBITOL,) 15-19.3 gram/60 mL suspension Take 15 g by mouth now. Provider, Historical   melatonin 3 mg tablet Take  by mouth. Provider, Historical   L. acidoph & paracasei- S therm- Bifido (EH-Q/RISAQUAD) 8 billion cell cap cap Take 1 Cap by mouth daily. 11/9/18   Fred Young MD   carvedilol (COREG) 3.125 mg tablet Take 1 Tab by mouth two (2) times daily (with meals). 12/13/17   Blue Rodas NP   losartan (COZAAR) 25 mg tablet Take 0.5 Tabs by mouth daily. Patient taking differently: Take 50 mg by mouth daily. 12/14/17   Blue Rodas NP   ondansetron hcl (ZOFRAN) 4 mg tablet Take 4 mg by mouth every eight (8) hours as needed for Nausea. Provider, Historical   hydrALAZINE (APRESOLINE) 25 mg tablet TAKE 1 TABLET BY MOUTH TWICE DAILY 8/5/16   Harley Teixeira MD   OTHER Rollator Sarwat Prieto with seat  icd 10 - G20 5/25/16   Harley Teixeira MD   lovastatin (MEVACOR) 20 mg tablet TAKE 1 TABLET BY MOUTH EVERY DAY 5/24/16   Harley Teixeira MD   docusate sodium (COLACE) 50 mg capsule Take 50 mg by mouth daily. Provider, Historical   acetaminophen (TYLENOL) 500 mg tablet Take 500 mg by mouth every six (6) hours as needed for Pain. Provider, Historical   carbidopa-levodopa (SINEMET)  mg per tablet Take 2 Tabs by mouth four (4) times daily. Provider, Historical   multivitamin with iron tablet Take 1 Tab by mouth daily.  Pablo Vitamins    Provider, Historical     Allergies   Allergen Reactions    Bactroban [Mupirocin Calcium] Other (comments)     Skin peels    Betadine [Povidone-Iodine] Rash    Comtan [Entacapone] Diarrhea    Erythromycin Other (comments)     GI upset    Penicillins Itching    Sulfa (Sulfonamide Antibiotics) Itching      Family History   Problem Relation Age of Onset    Coronary Artery Disease Father         s/p MI    Heart Disease Father     Elevated Lipids Father     Cancer Brother         hodgkins    Anesth Problems Neg Hx         SOCIAL HISTORY:  Patient resides at nursing home. Patient wheelchair bound   Smoking history: Unknown  Alcohol history:  None        Objective:       Physical Exam:   Patient Vitals for the past 12 hrs:   Temp Pulse Resp BP SpO2   12/31/19 2244     93 %   12/31/19 2214 (!) 94.2 °F (34.6 °C) (!) 57 24 135/58 94 %        GEN APPEARANCE: Patient resting in bed in NAD  HEENT: Conjunctiva Clear. Dry oral mucosa. CVS: RRR, S1, S2; No M/G/R  LUNGS: diffuse crackles. ABD: Soft; No TTP; + Normoactive BS  EXT: WWP, no edema   Skin exam: No gross lesions noted on exposed skin surfaces  MENTAL STATUS: Lethargic responds to commands. Cranial nerve exam: EOMI, CN V sensory/motor intact, no facial asymmetry noted, patient able to hearl, uvula midline, able to move tongue left/right.  No gross motor or sensory deficits      Data Review:   Recent Results (from the past 24 hour(s))   CBC WITH AUTOMATED DIFF    Collection Time: 12/31/19 11:12 PM   Result Value Ref Range    WBC 4.0 3.6 - 11.0 K/uL    RBC 2.43 (L) 3.80 - 5.20 M/uL    HGB 7.6 (L) 11.5 - 16.0 g/dL    HCT 26.1 (L) 35.0 - 47.0 %    .4 (H) 80.0 - 99.0 FL    MCH 31.3 26.0 - 34.0 PG    MCHC 29.1 (L) 30.0 - 36.5 g/dL    RDW 14.4 11.5 - 14.5 %    PLATELET 501 075 - 874 K/uL    MPV 9.8 8.9 - 12.9 FL    NRBC 0.0 0  WBC    ABSOLUTE NRBC 0.00 0.00 - 0.01 K/uL    NEUTROPHILS 54 32 - 75 %    LYMPHOCYTES 31 12 - 49 %    MONOCYTES 11 5 - 13 %    EOSINOPHILS 2 0 - 7 %    BASOPHILS 1 0 - 1 %    IMMATURE GRANULOCYTES 1 (H) 0.0 - 0.5 %    ABS. NEUTROPHILS 2.2 1.8 - 8.0 K/UL    ABS. LYMPHOCYTES 1.2 0.8 - 3.5 K/UL    ABS. MONOCYTES 0.4 0.0 - 1.0 K/UL    ABS. EOSINOPHILS 0.1 0.0 - 0.4 K/UL    ABS. BASOPHILS 0.0 0.0 - 0.1 K/UL    ABS. IMM. GRANS. 0.0 0.00 - 0.04 K/UL    DF AUTOMATED     METABOLIC PANEL, COMPREHENSIVE    Collection Time: 12/31/19 11:12 PM   Result Value Ref Range    Sodium 147 (H) 136 - 145 mmol/L    Potassium 5.2 (H) 3.5 - 5.1 mmol/L    Chloride 119 (H) 97 - 108 mmol/L    CO2 24 21 - 32 mmol/L    Anion gap 4 (L) 5 - 15 mmol/L    Glucose 81 65 - 100 mg/dL    BUN 56 (H) 6 - 20 MG/DL    Creatinine 2.16 (H) 0.55 - 1.02 MG/DL    BUN/Creatinine ratio 26 (H) 12 - 20      GFR est AA 26 (L) >60 ml/min/1.73m2    GFR est non-AA 21 (L) >60 ml/min/1.73m2    Calcium 8.4 (L) 8.5 - 10.1 MG/DL    Bilirubin, total 0.2 0.2 - 1.0 MG/DL    ALT (SGPT) 7 (L) 12 - 78 U/L    AST (SGOT) 25 15 - 37 U/L    Alk.  phosphatase 70 45 - 117 U/L    Protein, total 6.2 (L) 6.4 - 8.2 g/dL    Albumin 2.2 (L) 3.5 - 5.0 g/dL    Globulin 4.0 2.0 - 4.0 g/dL    A-G Ratio 0.6 (L) 1.1 - 2.2     NT-PRO BNP    Collection Time: 12/31/19 11:12 PM   Result Value Ref Range    NT pro-BNP 14,763 (H) <450 PG/ML   POC LACTIC ACID    Collection Time: 12/31/19 11:14 PM   Result Value Ref Range    Lactic Acid (POC) <0.30 (L) 0.40 - 2.00 mmol/L   URINALYSIS W/ REFLEX CULTURE    Collection Time: 12/31/19 11:43 PM   Result Value Ref Range    Color DARK YELLOW      Appearance CLOUDY (A) CLEAR      Specific gravity 1.019 1.003 - 1.030      pH (UA) 5.0 5.0 - 8.0      Protein 300 (A) NEG mg/dL    Glucose NEGATIVE  NEG mg/dL    Ketone NEGATIVE  NEG mg/dL    Bilirubin NEGATIVE  NEG      Blood NEGATIVE  NEG      Urobilinogen 1.0 0.2 - 1.0 EU/dL    Nitrites NEGATIVE  NEG      Leukocyte Esterase NEGATIVE  NEG      WBC 0-4 0 - 4 /hpf    RBC 0-5 0 - 5 /hpf    Epithelial cells FEW FEW /lpf    Bacteria 2+ (A) NEG /hpf    UA:UC IF INDICATED URINE CULTURE ORDERED      Granular cast 2-5 (A) NEG /lpf   SAMPLES BEING HELD    Collection Time: 12/31/19 11:43 PM   Result Value Ref Range    SAMPLES BEING HELD  1 cup ua     COMMENT        Add-on orders for these samples will be processed based on acceptable specimen integrity and analyte stability, which may vary by analyte. PROCALCITONIN    Collection Time: 01/01/20  1:10 AM   Result Value Ref Range    Procalcitonin 0.18 ng/mL     Xr Chest Port    Result Date: 12/31/2019  IMPRESSION: 1. There is opacification left retrocardiac region consistent with effusion/atelectasis/airspace disease. There is minor atelectasis/airspace disease right base. .      Assessment:     Active Problems:    Hypothermia (1/1/2020)      Pneumonia (1/1/2020)        Plan:     1. Acute hypoxic respiratory failure- likely secondary to fluid overload given elevated proBNP vs PNA (however procalcitonin low). No h/o HF as per medical documentation.   - Will try lasix 20mg IV x 1.  - Echo ordered  - Given hypothermia will cover empirically for PNA with IV antibiotic.   - Follow up blood culture  - Check Rapid influenza. - Albuterol neb treatment q4 hours. 2. Acute on chronic CKD stage 3- likely prerenal. Received 1L IV fluids in ER.  - Strict I&O  - Monitor Renal function  - Avoid nephrotoxic agents. 3. Dementia- home medications needs to be confirmed. At this time patient is conversant and interactive during exam.     4. HTN- bp stable. Restart home medications. 5. Anemia- no active sign of bleeding. H&H close to baseline. Used to receive procrit but unknown when was last dose. Will monitor H&H. Recommend to transfuse if Hgb <7.    6. Mild Hypernatremia- likely due to dehydration. Free water by mouth. DVT prophy: SCDs  Code status: Full code    Surrogate decision maker: POA is daughter. FUNCTIONAL STATUS PRIOR TO HOSPITALIZATION: wheelchair.      Signed By: Roxanna Dhaliwal MD January 1, 2020

## 2020-01-01 NOTE — PROGRESS NOTES
1200: Bedside shift change report given to Angel Sam RN (oncoming nurse) by Matthew Reardon RN (offgoing nurse). Report included the following information SBAR, Kardex, Intake/Output, MAR, Recent Results, Med Rec Status and Cardiac Rhythm NSR.       1204: BG 77. Patient AMS, AOx1, drowsy. Patient able to drink small amounts of juice after dysphagia screening. Small amount of coughing noted midway through juice. Will give D10 when IV pump available. 01/01/20 1208   Vital Signs   Temp 96.7 °F (35.9 °C)   Temp Source Axillary   Pulse (Heart Rate) 64   Heart Rate Source Monitor   Cardiac Rhythm NSR   Resp Rate 29   O2 Sat (%) 100 %   Level of Consciousness Alert   BP (!) 159/93   MAP (Calculated) 115   BP 1 Method Automatic   BP 1 Location Left arm   BP Patient Position At rest   MEWS Score 2     1208: Warm blankets applied to patient. 1236: IV punmp available. D10 started. HYPOGLYCEMIC EPISODE DOCUMENTATION    Patient with hypoglycemic episode(s) at 1204(time) on 1-1-20(date). BG value(s) pre-treatment 68    Was patient symptomatic? [] yes, [x] no  Patient was treated with the following rescue medications/treatments: [x] D10                [] Glucose tablets                [] Glucagon                [] 4oz juice                [] 6oz reg soda                [] 8oz low fat milk  BG value post-treatment: 127  Once BG treated and value greater than 80mg/dl, pt was provided with the following:  [x] snack - Refused  [] meal  Name of MD notified:Reji  The following orders were received: None, continue monitoring    1430: Admission database finished with help of [employer]. PTA meds to be faxed to nurses station to complete the database.        01/01/20 1502   Vital Signs   Temp 97.2 °F (36.2 °C)   Temp Source Axillary   Pulse (Heart Rate) (!) 58   Heart Rate Source Monitor   Cardiac Rhythm Sinus Rayshawn   Resp Rate 22   O2 Sat (%) 100 %   Level of Consciousness Alert   /65   MAP (Calculated) 108 BP 1 Method Automatic   BP 1 Location Left arm   BP Patient Position At rest   MEWS Score 2   Pain 1   Pain Scale 1 Numeric (0 - 10)   Pain Intensity 1 0       1502: PTA medications confirmed in computer by [employer] ([phone]). Dr. Parish Chapa made aware of PTA meds and current hypertension. 1537: Patient much more alert than previous dysphagia screening. Patient passed without issue. 1607: BG 80. Patient encouraged to drink juice and eat applesauce. Will attempt to feed dinner once it arrives. 1655: Requested orders from Dr. Parish Chapa for hypertension. 1800: Scant, immeasurable urine in purewick device. Brief dry. Bladderscan shows 40mL. Dr. Parish Chapa ordered 500mL bolus and 125/hr normal saline. Will set up, and continue to monitor. 2000: Bedside shift change report given to Jhony Garcia RN (oncoming nurse) by Mayuri Fabian RN (offgoing nurse). Report included the following information SBAR, Kardex, Intake/Output, MAR, Recent Results, Med Rec Status and Cardiac Rhythm NSR/SB.

## 2020-01-01 NOTE — PROGRESS NOTES
[facility] Adult  Hospitalist Group                                                                                          Hospitalist Progress Note  Duane Pott, MD  Answering service: [phone] OR [phone] from in house phone        Date of Service:  2020  NAME:  Thierno Castanedachure  :  1928  MRN:  [id number]      Admission Summary:   Excerpts from the H&P  This is a 66-year-old female with past medical history significant for CKD, solitary kidney, hypertension, Parkinson's disease, dementia was brought to the emergency room with increased shortness of breath and hypoxia. She is now admitted for acute hypoxic respiratory failure. Interval history / Subjective:   Patient was seen and examined in the IMCU. She is confused and unable to provide meaningful history. She is disoriented. Per records she has dementia. Assessment & Plan:     #Acute hypoxic respiratory failure most probably secondary to pneumonia. #Hypothermia  -Patient has diffuse wheezing, suspect she has preceding viral infection precipitating the above. Rapid influenza negative  -Continue supplemental oxygen. Continue nebulizer treatment. Continue with IV levofloxacin. Short course oral steroids.  -She has received  lasix 20mg IV x 1 on admission.  - Follow up blood culture  -She is off bear hugger and maintaining normal temperature     #Acute on chronic CKD stage 3- likely prerenal. Received 1L IV fluids in ER.  - Strict I&O  - Monitor Renal function  - Avoid nephrotoxic agents. #Uncontrolled hypertension. Blood pressure significantly elevated. Patient has not received her antihypertensives today. I have reordered her PTA antihypertensives to start from now. Holding losartan due to the      #Parkinson's disease. Baseline dementia. Continue Sinemet     # Anemia- no active sign of bleeding. H&H close to baseline. Used to receive procrit but unknown when was last dose. Monitor hemoglobin.   Transfuse as needed. Will try to clarify with family when she is due for the Procrit. # Mild Hypernatremia- likely due to dehydration. Free water by mouth. #Mild hyperkalemia: Resolved    #History of renal cell carcinoma status post left partial nephrectomy. DVT prophy: SCDs  Code status: Full code     Surrogate decision maker: POA is daughter. Code status: Full  DVT prophylaxis: SCD    Care Plan discussed with: Nurse  Disposition: TBD     Hospital Problems  Date Reviewed: 12/13/2017          Codes Class Noted POA    Hypothermia ICD-10-CM: T68. Lindsay Fluke  ICD-9-CM: 991.6  1/1/2020 Unknown        Pneumonia ICD-10-CM: J18.9  ICD-9-CM: 084  1/1/2020 Unknown                Review of Systems:   Review of systems not obtained due to patient factors. Vital Signs:    Last 24hrs VS reviewed since prior progress note. Most recent are:  Visit Vitals  /65 (BP 1 Location: Left arm, BP Patient Position: At rest)   Pulse (!) 58   Temp 97.2 °F (36.2 °C)   Resp 22   Ht 5' 5\" (1.651 m)   Wt 65.8 kg (145 lb)   SpO2 100%   BMI 24.13 kg/m²         Intake/Output Summary (Last 24 hours) at 1/1/2020 1700  Last data filed at 1/1/2020 1536  Gross per 24 hour   Intake 245 ml   Output 0 ml   Net 245 ml        Physical Examination:             Constitutional:  Awake but confused and disoriented. Has baseline dementia   ENT:  Oral mucous moist, oropharynx benign. Resp:  Shows diffuse expiratory wheezing   CV:  Regular rhythm, normal rate, no murmurs, gallops, rubs    GI:  Soft, non distended, non tender. normoactive bowel sounds, no hepatosplenomegaly     Musculoskeletal:  No edema, warm, 2+ pulses throughout    Neurologic:  Alert but confused disoriented. She moves all extremities symmetrically.             Data Review:    Review and/or order of clinical lab test  Review and/or order of tests in the radiology section of CPT  Review and/or order of tests in the medicine section of CPT      Labs:     Recent Labs     01/01/20  2431 01/01/20 0311 12/31/19  2312   WBC  --  3.5* 4.0   HGB 7.6* 7.0* 7.6*   HCT 25.9* 23.4* 26.1*   PLT  --  185 182     Recent Labs     01/01/20 0311 12/31/19  2312   * 147*   K 4.9 5.2*   * 119*   CO2 22 24   BUN 58* 56*   CREA 2.11* 2.16*   GLU 61* 81   CA 8.2* 8.4*   MG 2.0  --      Recent Labs     12/31/19  2312   SGOT 25   ALT 7*   AP 70   TBILI 0.2   TP 6.2*   ALB 2.2*   GLOB 4.0     No results for input(s): INR, PTP, APTT, INREXT in the last 72 hours. No results for input(s): FE, TIBC, PSAT, FERR in the last 72 hours. No results found for: FOL, RBCF   No results for input(s): PH, PCO2, PO2 in the last 72 hours. No results for input(s): CPK, CKNDX, TROIQ in the last 72 hours.     No lab exists for component: CPKMB  Lab Results   Component Value Date/Time    Cholesterol, total 210 (H) 12/12/2017 12:32 AM    HDL Cholesterol 63 12/12/2017 12:32 AM    LDL, calculated 133.4 (H) 12/12/2017 12:32 AM    Triglyceride 68 12/12/2017 12:32 AM    CHOL/HDL Ratio 3.3 12/12/2017 12:32 AM     Lab Results   Component Value Date/Time    Glucose (POC) 80 01/01/2020 04:07 PM    Glucose (POC) 127 (H) 01/01/2020 12:53 PM    Glucose (POC) 77 01/01/2020 12:04 PM    Glucose (POC) 78 01/01/2020 09:51 AM    Glucose (POC) 80 01/01/2020 05:54 AM     Lab Results   Component Value Date/Time    Color DARK YELLOW 12/31/2019 11:43 PM    Appearance CLOUDY (A) 12/31/2019 11:43 PM    Specific gravity 1.019 12/31/2019 11:43 PM    pH (UA) 5.0 12/31/2019 11:43 PM    Protein 300 (A) 12/31/2019 11:43 PM    Glucose NEGATIVE  12/31/2019 11:43 PM    Ketone NEGATIVE  12/31/2019 11:43 PM    Bilirubin NEGATIVE  12/31/2019 11:43 PM    Urobilinogen 1.0 12/31/2019 11:43 PM    Nitrites NEGATIVE  12/31/2019 11:43 PM    Leukocyte Esterase NEGATIVE  12/31/2019 11:43 PM    Epithelial cells FEW 12/31/2019 11:43 PM    Bacteria 2+ (A) 12/31/2019 11:43 PM    WBC 0-4 12/31/2019 11:43 PM    RBC 0-5 12/31/2019 11:43 PM         Medications Reviewed: Current Facility-Administered Medications   Medication Dose Route Frequency    sodium chloride (NS) flush 5-40 mL  5-40 mL IntraVENous Q8H    sodium chloride (NS) flush 5-40 mL  5-40 mL IntraVENous PRN    albuterol (PROVENTIL VENTOLIN) nebulizer solution 2.5 mg  2.5 mg Nebulization Q4H RT    glucose chewable tablet 16 g  4 Tab Oral PRN    glucagon (GLUCAGEN) injection 1 mg  1 mg IntraMUSCular PRN    dextrose 10% infusion 0-250 mL  0-250 mL IntraVENous PRN    [START ON 1/3/2020] levoFLOXacin (LEVAQUIN) 500 mg in D5W IVPB  500 mg IntraVENous Q48H    alteplase (CATHFLO) 1 mg in sterile water (preservative free) 1 mL injection  1 mg InterCATHeter PRN    heparin (porcine) pf 500 Units  500 Units InterCATHeter PRN    insulin lispro (HUMALOG) injection   SubCUTAneous AC&HS    carbidopa-levodopa (SINEMET)  mg per tablet 2 Tab  2 Tab Oral QID    [START ON 1/2/2020] lactobac ac& pc-s.therm-b.anim (EH Q/RISAQUAD)  1 Cap Oral DAILY    [Held by provider] losartan (COZAAR) tablet 50 mg  50 mg Oral DAILY    hydrALAZINE (APRESOLINE) tablet 50 mg  50 mg Oral TID    carvedilol (COREG) tablet 3.125 mg  3.125 mg Oral BID WITH MEALS     ______________________________________________________________________  EXPECTED LENGTH OF STAY: - - -  ACTUAL LENGTH OF STAY:          0                 Lamont Jaramillo MD

## 2020-01-02 NOTE — PROGRESS NOTES
[facility] Adult  Hospitalist Group                                                                                          Hospitalist Progress Note  Nadya Kaur MD  Answering service: [phone] OR [phone] from in house phone        Date of Service:  2020  NAME:  Kristina Melara  :  1928  MRN:  [id number]      Admission Summary:   Excerpts from the H&P  This is a 80-year-old female with past medical history significant for CKD, solitary kidney, hypertension, Parkinson's disease, dementia was brought to the emergency room with increased shortness of breath and hypoxia. She is now admitted for acute hypoxic respiratory failure. Interval history / Subjective:   Patient awake but confused     Assessment & Plan:     #Pneumonia. #Hypothermia  -Suspect she has preceding viral infection precipitating the above. Rapid influenza negative  -Continue supplemental oxygen. Continue nebulizer treatment. Continue with IV levofloxacin. Short course oral steroids.  -She has received  lasix 20mg IV x 1 on admission.  - Follow up blood culture       #Acute on chronic CKD stage 3- likely prerenal. Received 1L IV fluids in ER.  - Strict I&O  - Monitor Renal function  - Avoid nephrotoxic agents. #Uncontrolled hypertension  -Resumed home regimen. Adjust as needed     #Parkinson's disease. Baseline dementia. Continue Sinemet     # Anemia. Hemoglobin down to 6.9 today? Dilution  -Patient gets monthly Procrit shot per her daughter. I discussed with daughter who wants to hold off transfusion and asked me to talk with nephrologist if they can give her Procrit shot in the hospital instead. Renal consulted     # Mild Hypernatremia- likely due to dehydration. Free water by mouth. #Mild hyperkalemia: Repeat labs    #History of renal cell carcinoma status post left partial nephrectomy. DVT prophy: SCDs  Code status: Full code     Surrogate decision maker: POA is daughter.        Code status: Full  DVT prophylaxis: SCD    Care Plan discussed with: Nurse  Disposition: TBD     Hospital Problems  Date Reviewed: 12/13/2017          Codes Class Noted POA    Hypothermia ICD-10-CM: T68Taylor Bonilla  ICD-9-CM: 991.6  1/1/2020 Unknown        Pneumonia ICD-10-CM: J18.9  ICD-9-CM: 902  1/1/2020 Unknown                Review of Systems:   Review of systems not obtained due to patient factors. Vital Signs:    Last 24hrs VS reviewed since prior progress note. Most recent are:  Visit Vitals  /65 (BP 1 Location: Right arm, BP Patient Position: At rest;Lying right side)   Pulse 62   Temp 98.4 °F (36.9 °C)   Resp 17   Ht 5' 5\" (1.651 m)   Wt 62.6 kg (138 lb 1.6 oz)   SpO2 100%   BMI 22.98 kg/m²         Intake/Output Summary (Last 24 hours) at 1/2/2020 1544  Last data filed at 1/2/2020 1402  Gross per 24 hour   Intake 1645.83 ml   Output 455 ml   Net 1190.83 ml        Physical Examination:             Constitutional:  Awake but confused and disoriented. Has baseline dementia   ENT:  Oral mucous moist, oropharynx benign. Resp:  Shows diffuse expiratory wheezing   CV:  Regular rhythm, normal rate, no murmurs, gallops, rubs    GI:  Soft, non distended, non tender. normoactive bowel sounds, no hepatosplenomegaly     Musculoskeletal:  No edema, warm, 2+ pulses throughout    Neurologic:  Alert but confused disoriented. She moves all extremities symmetrically.             Data Review:    Review and/or order of clinical lab test  Review and/or order of tests in the radiology section of CPT  Review and/or order of tests in the medicine section of CPT      Labs:     Recent Labs     01/02/20  0144 01/01/20  1238 01/01/20 0311   WBC 5.3  --  3.5*   HGB 6.9* 7.6* 7.0*   HCT 23.0* 25.9* 23.4*     --  185     Recent Labs     01/02/20  0144 01/01/20  0311 12/31/19  2312   * 146* 147*   K 5.2* 4.9 5.2*   * 120* 119*   CO2 21 22 24   BUN 53* 58* 56*   CREA 2.07* 2.11* 2.16*    61* 81   CA 8.4* 8.2* 8.4* MG  --  2.0  --      Recent Labs     12/31/19  2312   SGOT 25   ALT 7*   AP 70   TBILI 0.2   TP 6.2*   ALB 2.2*   GLOB 4.0     No results for input(s): INR, PTP, APTT, INREXT, INREXT in the last 72 hours. Recent Labs     01/02/20  1043   TIBC 116*   PSAT 27   FERR 489*      No results found for: FOL, RBCF   No results for input(s): PH, PCO2, PO2 in the last 72 hours. No results for input(s): CPK, CKNDX, TROIQ in the last 72 hours.     No lab exists for component: CPKMB  Lab Results   Component Value Date/Time    Cholesterol, total 210 (H) 12/12/2017 12:32 AM    HDL Cholesterol 63 12/12/2017 12:32 AM    LDL, calculated 133.4 (H) 12/12/2017 12:32 AM    Triglyceride 68 12/12/2017 12:32 AM    CHOL/HDL Ratio 3.3 12/12/2017 12:32 AM     Lab Results   Component Value Date/Time    Glucose (POC) 109 (H) 01/02/2020 11:29 AM    Glucose (POC) 84 01/02/2020 06:25 AM    Glucose (POC) 91 01/01/2020 10:04 PM    Glucose (POC) 80 01/01/2020 04:07 PM    Glucose (POC) 127 (H) 01/01/2020 12:53 PM     Lab Results   Component Value Date/Time    Color DARK YELLOW 12/31/2019 11:43 PM    Appearance CLOUDY (A) 12/31/2019 11:43 PM    Specific gravity 1.019 12/31/2019 11:43 PM    pH (UA) 5.0 12/31/2019 11:43 PM    Protein 300 (A) 12/31/2019 11:43 PM    Glucose NEGATIVE  12/31/2019 11:43 PM    Ketone NEGATIVE  12/31/2019 11:43 PM    Bilirubin NEGATIVE  12/31/2019 11:43 PM    Urobilinogen 1.0 12/31/2019 11:43 PM    Nitrites NEGATIVE  12/31/2019 11:43 PM    Leukocyte Esterase NEGATIVE  12/31/2019 11:43 PM    Epithelial cells FEW 12/31/2019 11:43 PM    Bacteria 2+ (A) 12/31/2019 11:43 PM    WBC 0-4 12/31/2019 11:43 PM    RBC 0-5 12/31/2019 11:43 PM         Medications Reviewed:     Current Facility-Administered Medications   Medication Dose Route Frequency    dextrose 5% infusion  100 mL/hr IntraVENous CONTINUOUS    insulin lispro (HUMALOG) injection   SubCUTAneous AC&HS    glucose chewable tablet 16 g  4 Tab Oral PRN    glucagon (GLUCAGEN) injection 1 mg  1 mg IntraMUSCular PRN    dextrose 10% infusion 0-250 mL  0-250 mL IntraVENous PRN    sodium chloride (NS) flush 5-40 mL  5-40 mL IntraVENous Q8H    sodium chloride (NS) flush 5-40 mL  5-40 mL IntraVENous PRN    albuterol (PROVENTIL VENTOLIN) nebulizer solution 2.5 mg  2.5 mg Nebulization Q4H RT    glucose chewable tablet 16 g  4 Tab Oral PRN    glucagon (GLUCAGEN) injection 1 mg  1 mg IntraMUSCular PRN    dextrose 10% infusion 0-250 mL  0-250 mL IntraVENous PRN    [START ON 1/3/2020] levoFLOXacin (LEVAQUIN) 500 mg in D5W IVPB  500 mg IntraVENous Q48H    alteplase (CATHFLO) 1 mg in sterile water (preservative free) 1 mL injection  1 mg InterCATHeter PRN    heparin (porcine) pf 500 Units  500 Units InterCATHeter PRN    carbidopa-levodopa (SINEMET)  mg per tablet 2 Tab  2 Tab Oral QID    lactobac ac& pc-s.therm-b.anim (EH Q/RISAQUAD)  1 Cap Oral DAILY    [Held by provider] losartan (COZAAR) tablet 50 mg  50 mg Oral DAILY    hydrALAZINE (APRESOLINE) tablet 50 mg  50 mg Oral TID    carvedilol (COREG) tablet 3.125 mg  3.125 mg Oral BID WITH MEALS    predniSONE (DELTASONE) tablet 30 mg  30 mg Oral DAILY WITH BREAKFAST    hydrALAZINE (APRESOLINE) 20 mg/mL injection 10 mg  10 mg IntraVENous Q6H PRN     ______________________________________________________________________  EXPECTED LENGTH OF STAY: 3d 7h  ACTUAL LENGTH OF STAY:          1                 Alesha Dumont MD

## 2020-01-02 NOTE — PROGRESS NOTES
Problem: Pressure Injury - Risk of  Goal: *Prevention of pressure injury  Description  Document Chester Scale and appropriate interventions in the flowsheet. Outcome: Progressing Towards Goal  Note: Pressure Injury Interventions:  Sensory Interventions: Assess changes in LOC, Assess need for specialty bed, Avoid rigorous massage over bony prominences, Check visual cues for pain, Discuss PT/OT consult with provider, Keep linens dry and wrinkle-free, Maintain/enhance activity level, Minimize linen layers, Monitor skin under medical devices, Pressure redistribution bed/mattress (bed type), Turn and reposition approx. every two hours (pillows and wedges if needed)    Moisture Interventions: Absorbent underpads, Apply protective barrier, creams and emollients, Assess need for specialty bed, Check for incontinence Q2 hours and as needed, Contain wound drainage, Internal/External urinary devices, Limit adult briefs, Maintain skin hydration (lotion/cream), Minimize layers, Moisture barrier    Activity Interventions: Assess need for specialty bed, Increase time out of bed, Pressure redistribution bed/mattress(bed type), PT/OT evaluation    Mobility Interventions: Assess need for specialty bed, HOB 30 degrees or less, Pressure redistribution bed/mattress (bed type), PT/OT evaluation, Turn and reposition approx.  every two hours(pillow and wedges), Float heels    Nutrition Interventions: Document food/fluid/supplement intake, Discuss nutritional consult with provider, Offer support with meals,snacks and hydration    Friction and Shear Interventions: Apply protective barrier, creams and emollients, Feet elevated on foot rest, HOB 30 degrees or less, Lift sheet, Lift team/patient mobility team, Minimize layers, Transferring/repositioning devices  Problem: Breathing Pattern - Ineffective  Goal: *Use of effective breathing techniques  Outcome: Progressing Towards Goal     Problem: Falls - Risk of  Goal: *Absence of Falls  Description  Document Raj Wells Fall Risk and appropriate interventions in the flowsheet.   Outcome: Progressing Towards Goal  Note: Fall Risk Interventions:    Mentation Interventions: Adequate sleep, hydration, pain control, Bed/chair exit alarm, Door open when patient unattended, Evaluate medications/consider consulting pharmacy, Eyeglasses and hearing aids, Familiar objects from home, HELP ([address]) if available, Increase mobility, More frequent rounding, Reorient patient, Room close to nurse's station, Update white board    Medication Interventions: Evaluate medications/consider consulting pharmacy, Patient to call before getting OOB, Teach patient to arise slowly, Utilize gait belt for transfers/ambulation, Bed/chair exit alarm    Elimination Interventions: Bed/chair exit alarm, Call light in reach, Patient to call for help with toileting needs, Toileting schedule/hourly rounds

## 2020-01-02 NOTE — DIABETES MGMT
[facility]    [facility] Progress Note    Recommendations/ Comments: Chart review for hypoglycemia in the past 24 hours. No documented hx of DM. Likely due to limited PO. D5W initiated this morning. Pt also now receiving 30 mg prednisone daily. Hypoglycemic protocol in place. Consider changing correction scale to high sensitivity given elevated creatinine. Will follow. Current hospital DM medication: lispro correction scale     Chart reviewed on Mayra Anna. Patient is a 80 y.o. female with no observed hx of DM. A1c:   No results found for: HBA1C, HGBE8, [id number]    Recent Glucose Results:   Lab Results   Component Value Date/Time     01/02/2020 01:44 AM    GLUCPOC 109 (H) 01/02/2020 11:29 AM    GLUCPOC 84 01/02/2020 06:25 AM    GLUCPOC 91 01/01/2020 10:04 PM        Lab Results   Component Value Date/Time    Creatinine 2.07 (H) 01/02/2020 01:44 AM     Estimated Creatinine Clearance: 15.9 mL/min (A) (based on SCr of 2.07 mg/dL (H)). Active Orders   Diet    DIET CARDIAC Regular        PO intake:   Patient Vitals for the past 72 hrs:   % Diet Eaten   01/01/20 1736 5 %       Will continue to follow as needed.     Thank you  Angela Campoverde RD

## 2020-01-02 NOTE — CDMP QUERY
#1    Pt admitted with Pneumonia/Pt noted to have documentation of \"respiratory failure\" not supported with Criteria    If possible, please document in the progress notes and d/c summary if you are evaluating and / or treating any of the following:     Acute Respiratory Failure Ruled Out   Acute Hypoxic Insufficiency    Other, please specify   Clinically unable to determine    The medical record reflects the following:    Risk Factors: SOB    Clinical Indicators:   resp rate 24-20      H/P  Acute hypoxic respiratory failure- likely secondary to fluid overload given elevated proBNP vs PNA (however procalcitonin low). No h/o HF as per medical documentation    1/1-PN  Resp:  Shows diffuse expiratory wheezing     CXR  IMPRESSION:   1. There is opacification left retrocardiac region consistent with  effusion/atelectasis/airspace disease. There is minor atelectasis/airspace  disease right base. .        Treatment: only 2 lts/min oxygen, albuterol nebs, lasix IV    Thank you,         Courtney Iniguez RN, CCDS            [phone]      Criteria that is needed to support diagnosis:    1. Difficulty breathing: SOB, dyspnea, tachypnea (RR >/= 28), respiratory distress, cyanosis, use of accessory muscles, air hunger, inability to speak in full sentences    AND    2.  Blood gas abnormality   pO2 < 60 or SpO2 </= 88%  while on room air   pCO2 > 50 & usually pH < 7.35    pO2 < 70 or SpO2 < 92% while on supplemental O2    pO2 < 80 or SpO2 < 95% with FiO2 > 32%   Requiring more than 40% FiO2, regardless of pO2/SpO2

## 2020-01-02 NOTE — PROGRESS NOTES
Physical Therapy Screening:  Services are not indicated at this time. [facility] therapy based on results obtained during the nursing admission assessment. The patients chart was reviewed and the patient is not appropriate for a skilled therapy evaluation at this time. Please consult physical therapy if any therapy needs arise. Thank you.     Jose Enrique Degroot, PT

## 2020-01-02 NOTE — PROGRESS NOTES
Problem: Pressure Injury - Risk of  Goal: *Prevention of pressure injury  Description  Document Chester Scale and appropriate interventions in the flowsheet. Outcome: Progressing Towards Goal  Note: Pressure Injury Interventions:  Sensory Interventions: Assess changes in LOC, Discuss PT/OT consult with provider, Minimize linen layers, Turn and reposition approx.  every two hours (pillows and wedges if needed)    Moisture Interventions: Apply protective barrier, creams and emollients, Limit adult briefs, Internal/External urinary devices, Maintain skin hydration (lotion/cream)    Activity Interventions: Increase time out of bed, Pressure redistribution bed/mattress(bed type), PT/OT evaluation    Mobility Interventions: HOB 30 degrees or less, Pressure redistribution bed/mattress (bed type)    Nutrition Interventions: Document food/fluid/supplement intake    Friction and Shear Interventions: HOB 30 degrees or less, Apply protective barrier, creams and emollients, Minimize layers                Problem: Breathing Pattern - Ineffective  Goal: *Absence of hypoxia  Outcome: Progressing Towards Goal    Maintained 02 sats WDL on 2 L NC.

## 2020-01-02 NOTE — PROGRESS NOTES
Bedside shift change report given to Aletha Franco RN (oncoming nurse) by Urbano Grider RN (offgoing nurse). Report included the following information SBAR, Kardex, ED Summary, Procedure Summary, Intake/Output, MAR, Accordion, Recent Results, Med Rec Status, Cardiac Rhythm NSR, Alarm Parameters , Pre Procedure Checklist, Procedure Verification and Quality Measures. 2354: [employer]. NP notified about Pt. BP before 206/48 and after 193/63  the dose of PO Hydralazine. Ordered to wait for another hour and recheck again. 0250: [employer]. NP notified about Pt. Hb 6.9. No orders received. 0400: Swelling noted around Left elbow and Pt. Complains of pain when moving arm.  Will notified the hospitalist.

## 2020-01-03 NOTE — PROGRESS NOTES
Renea NP Progress note    Name: Sebastián Merino  YOB: 1928  MRN: [id number]  Admission Date: 12/31/2019 10:04 PM    Date of service: 1/3/2020 5:25 AM                                Overnight Update:        Complaint: Anemia  Paged by: Matthew Driver RN  Subjective: HGB 6.4 this AM, down from 6.9 yesterday. Hemodynamically stable, no overt blood loss. Plan:   - Per attending's note (Dr Mynor Nguyen) patient's daughter declined transfusion yesterday, wishing to discuss with nephrologist. Patient VSS stable at this time, will hold transfusion and defer to primary team to discuss with family.  Will call daughter to discuss transfusion if patient's status changes.  -Type and screen ordered in anticipation of likely transfusion     Vaibhav KEITA-C, PAKevynC  [phone] or Chin

## 2020-01-03 NOTE — PROGRESS NOTES
0445: Hemoglobin resulted at 6.4. Notified NP, Sukhi Brambila, and per note from MD Dr. Jonnie Martin the plan is to wait nephrology consult. Will not transfuse but will continue to monitor. Notified oncoming nurse. Problem: Pressure Injury - Risk of  Goal: *Prevention of pressure injury  Description  Document Chester Scale and appropriate interventions in the flowsheet. Outcome: Progressing Towards Goal  Note: Pressure Injury Interventions:  Sensory Interventions: Assess need for specialty bed, Avoid rigorous massage over bony prominences, Check visual cues for pain, Discuss PT/OT consult with provider, Float heels, Keep linens dry and wrinkle-free, Minimize linen layers, Monitor skin under medical devices, Pad between skin to skin, Pressure redistribution bed/mattress (bed type), Turn and reposition approx. every two hours (pillows and wedges if needed)    Moisture Interventions: Absorbent underpads, Apply protective barrier, creams and emollients, Assess need for specialty bed, Check for incontinence Q2 hours and as needed, Internal/External urinary devices, Limit adult briefs, Minimize layers, Offer toileting Q_hr, Moisture barrier    Activity Interventions: Assess need for specialty bed, Chair cushion, Increase time out of bed, PT/OT evaluation, Pressure redistribution bed/mattress(bed type)    Mobility Interventions: Assess need for specialty bed, Chair cushion, Float heels, PT/OT evaluation, Pressure redistribution bed/mattress (bed type), HOB 30 degrees or less, Turn and reposition approx.  every two hours(pillow and wedges)    Nutrition Interventions: Document food/fluid/supplement intake, Discuss nutritional consult with provider, Offer support with meals,snacks and hydration    Friction and Shear Interventions: Apply protective barrier, creams and emollients, Foam dressings/transparent film/skin sealants, HOB 30 degrees or less, Lift sheet, Lift team/patient mobility team, Minimize layers    Problem: Airway Clearance - Ineffective  Goal: *Absence of airway secretions  Outcome: Not Progressing Towards Goal  pt has wet, expiratory breath sounds unchanged by respiratory treatments. Pt has nonproductive cough. Chest xray on 12/31/19 positive for opacification left retrocardiac region consistent with effusion/atelectasis/airspace disease. There is minor atelectasis/airspace disease right base. Previous day shift nurse said that this is baseline. Will continue to monitor. Problem: Breathing Pattern - Ineffective  Goal: *Absence of hypoxia  Outcome: Progressing Towards Goal   Pt's oxygen saturations remain above 95% on 2 L NC. Will continue to monitor. Last 3 Recorded Weights in this Encounter    12/31/19 2214 01/02/20 0406 01/03/20 0310   Weight: 65.8 kg (145 lb) 62.6 kg (138 lb 1.6 oz) 62.4 kg (137 lb 8 oz)     Bedside shift change report given to Reuben Kimble RN (oncoming nurse) by [facility] RN (offgoing nurse). Report included the following information SBAR, Kardex, ED Summary, Procedure Summary, Intake/Output, MAR, Recent Results and Cardiac Rhythm NSR/mariluz.

## 2020-01-03 NOTE — PROGRESS NOTES
Patient is a sound bundled patient. Reason for Admission:   hypothermia                  RRAT Score:     18/ mod             Do you (patient/family) have any concerns for transition/discharge? This CM spoke with daughter Kyleigh Rankin, daughter 12- who indicated no concerns with patient returning to [address] [facility]. Plan for utilizing home health:   Patient will return to [facility] reachable at [phone]    Current Advanced Directive/Advance Care Plan:  Full code/ advanced care plan is on file            Transition of Care Plan:        Plan is for patient to return to LTC. Patient will require stretcher for transport. CM will need to follow. Care Management Interventions  PCP Verified by CM:  Yes  Palliative Care Criteria Met (RRAT>21 & CHF Dx)?: No  Transition of Care Consult (CM Consult): (resides at LTC patient at [address] [facility])  [address] [place] [place]: No  Discharge Durable Medical Equipment: No  Health Maintenance Reviewed: Yes  Physical Therapy Consult: No  Occupational Therapy Consult: No  Speech Therapy Consult: No  Current Support Network: (LTC patient )  Confirm Follow Up Transport: (Patient will require BLS)  The Plan for Transition of Care is Related to the Following Treatment Goals : Return to LTC  The Patient and/or Patient Representative was Provided with a Choice of Provider and Agrees with the Discharge Plan?: Yes  Discharge Location  Discharge Placement: [address] [facility] [place]  2:45 PM

## 2020-01-03 NOTE — CONSULTS
-     NEPHROLOGY CONSULT NOTE     Patient: Nohemi Villarreal MRN: [id number]  PCP: Almita Madison MD   :     1928  Age:   80 y.o. Sex:  female      Referring physician: Rustam Mock MD  Reason for consultation: 80 y.o. female with Hypothermia [T68. XXXA]  Pneumonia [H08.8] complicated by    Admission Date: 2019 10:04 PM  LOS: 2 days      ASSESSMENT and PLAN :    on CKD:  - likely from ATN from infection + mild volume depletion  - Cr stable  - would d/c IVF  - ok to give 1 unit of PRBCs  - give lasis 80mg IV x 1 post transfusion    CKD III:  - baseline Cr around 1.6 to 1.7  - from DM, HTN and reduced renal mass    Hx of RCC s/p partial L nephrectomy    HTN:  - cont current meds  - can resume losartan as well    Chronic Anemia:  - from CKD,receives IDA  - Hgb drifted down from 7.6 to 6.4   - give PRBCs today followed by lasix    Pneumonia:  - on abx per hospitalist    DM2:  - on insulin     Active Problems / Assessment AAActive  : Active Problems:    Hypothermia (2020)      Pneumonia (2020)         Subjective:   HPI: Nohemi Villarreal is a 80 y.o.  female who has been admitted to the hospital for increased SOB. Found to have PNA. She has CKD III/IV, baseline Cr around 1.6 to 1.7, RCC s/p partial L nephrectomy, chronic anemia on IDA as an outpt. She has been on Abx for PNA and IVF for mild  on CKD. Cr has been stable at 2. Her hgb has drifted down to 6.4 today. Iron studies ok. No current cp or increase in SOB. No n/v/d reported. Still on NC O2 and c/o some dyspnea.     Past Medical Hx:   Past Medical History:   Diagnosis Date    Anemia     Arthritis     Chronic kidney disease     RENAL MASS    Dementia with Lewy bodies ([facility] [address] 2017    Environmental allergies     HTN (hypertension) 2012    Hyperlipemia 2012    Hypertension     Other ill-defined conditions(799.89)     parkinson disease    Parkinson disease ([facility] [address] 2012    S/P radioactive iodine thyroid ablation 1/19/2012    S/P NINI-BSO 1/19/2012    S/P TKR (total knee replacement) 1/19/2012    Thyroid disease     goiter/hyperthyroid (s/p iodine tx 1999)    Unspecified adverse effect of anesthesia     CLAUSTROPHOBIA        Past Surgical Hx:     Past Surgical History:   Procedure Laterality Date    ENDOSCOPY, COLON, DIAGNOSTIC  7/2011    ([facility])    HX GI      COLONOSCOPY    HX ORTHOPAEDIC      r hip, l knee replacement 2008    HX NINI AND BSO  1946       Medications:  Prior to Admission medications    Medication Sig Start Date End Date Taking? Authorizing Provider   doxycycline (ADOXA) 100 mg tablet Take 100 mg by mouth two (2) times a day. Yes Provider, Historical   guaiFENesin (ROBITUSSIN) 100 mg/5 mL liquid Take 200 mg by mouth every four (4) hours as needed for Cough. Yes Provider, Historical   sodium polystyrene (KIONEX, WITH SORBITOL,) 15-19.3 gram/60 mL suspension Take 15 g by mouth now. Yes Provider, Historical   melatonin 3 mg tablet Take  by mouth. Yes Provider, Historical   carvedilol (COREG) 3.125 mg tablet Take 1 Tab by mouth two (2) times daily (with meals). 12/13/17  Yes Jacqueline Sheppard NP   losartan (COZAAR) 25 mg tablet Take 0.5 Tabs by mouth daily. Patient taking differently: Take 50 mg by mouth daily. 12/14/17  Yes Jacqueline Sheppard NP   hydrALAZINE (APRESOLINE) 25 mg tablet TAKE 1 TABLET BY MOUTH TWICE DAILY  Patient taking differently: 50 mg three (3) times daily. 8/5/16  Yes Diane Wilson MD   lovastatin (MEVACOR) 20 mg tablet TAKE 1 TABLET BY MOUTH EVERY DAY 5/24/16  Yes Beverly Simon MD   docusate sodium (COLACE) 50 mg capsule Take 100 mg by mouth daily. Yes Provider, Historical   multivitamin with iron tablet Take 1 Tab by mouth daily. [employer] [facility]   Yes Provider, Historical   benzonatate (TESSALON) 100 mg capsule Take 100 mg by mouth every eight (8) hours as needed for Cough.     Provider, Historical   polyethylene glycol (MIRALAX) 17 gram packet Take 17 g by mouth daily. Provider, Historical   L. acidoph & paracasei- S therm- Bifido (EH-Q/RISAQUAD) 8 billion cell cap cap Take 1 Cap by mouth daily. 11/9/18   Cony Hollis MD   ondansetron hcl (ZOFRAN) 4 mg tablet Take 4 mg by mouth every eight (8) hours as needed for Nausea. Provider, Historical   OTHER Rollator Walker with seat  icd 10 - G20 5/25/16   Francisco Lim MD   acetaminophen (TYLENOL) 500 mg tablet Take 500 mg by mouth every six (6) hours as needed for Pain. Provider, Historical   carbidopa-levodopa (SINEMET)  mg per tablet Take 2 Tabs by mouth four (4) times daily. Provider, Historical       Allergies   Allergen Reactions    Bactroban [Mupirocin Calcium] Other (comments)     Skin peels    Betadine [Povidone-Iodine] Rash    Comtan [Entacapone] Diarrhea    Erythromycin Other (comments)     GI upset    Penicillins Itching    Sulfa (Sulfonamide Antibiotics) Itching       Social Hx:  reports that she has never smoked. She has never used smokeless tobacco. She reports that she does not drink alcohol or use drugs. Family History   Problem Relation Age of Onset    Coronary Artery Disease Father         s/p MI    Heart Disease Father     Elevated Lipids Father     Cancer Brother         hodgkins    Anesth Problems Neg Hx        Review of Systems:  A twelve point review of system was performed today. Pertinent positives and negatives are mentioned in the HPI. The reminder of the ROS is negative and noncontributory.      Objective:    Vitals:    Vitals:    01/03/20 0400 01/03/20 0415 01/03/20 0702 01/03/20 0721   BP: 164/50 173/49 181/58    Pulse:   61    Resp:   19    Temp:   98.3 °F (36.8 °C)    SpO2:   100% 100%   Weight:       Height:         I&O's:  01/02 0701 - 01/03 0700  In: 900 [I.V.:900]  Out: 300 [Urine:300]  Visit Vitals  /58 (BP 1 Location: Right arm, BP Patient Position: At rest)   Pulse 61   Temp 98.3 °F (36.8 °C)   Resp 19   Ht 5' 5\" (1.651 m)   Wt 62.4 kg (137 lb 8 oz)   SpO2 100%   BMI 22.88 kg/m²       Physical Exam:  General:Alert, No distress, frail  HEENT: Eyes are PERRL. Conjunctiva without pallor ,erythema. The sclerae without icterus. .   Neck:Supple,no mass palpable  Lungs : Clears to auscultation Bilaterally, Normal respiratory effort  CVS: RRR, S1 S2 normal, No rub,  Chronic 1-2 + LE edema  Abdomen: Soft, Non tender, No hepatosplenomegaly, bowel sounds present  Extremities: No cyanosis, No clubbing  Skin: No rash or lesions.   Lymph nodes: No palpable nodes  MS: No joint swelling, erythema, warmth  Neurologic: non focal, AAO x 3    Laboratory Results:    Lab Results   Component Value Date    BUN 53 (H) 01/03/2020     01/03/2020    K 5.0 01/03/2020     (H) 01/03/2020    CO2 22 01/03/2020       Lab Results   Component Value Date    BUN 53 (H) 01/03/2020    BUN 53 (H) 01/02/2020    BUN 58 (H) 01/01/2020    BUN 56 (H) 12/31/2019    BUN 51 (H) 07/03/2019    K 5.0 01/03/2020    K 5.2 (H) 01/02/2020    K 4.9 01/01/2020    K 5.2 (H) 12/31/2019    K 4.7 07/03/2019       Lab Results   Component Value Date    WBC 3.0 (L) 01/03/2020    RBC 2.05 (L) 01/03/2020    HGB 6.4 (L) 01/03/2020    HCT 21.7 (L) 01/03/2020    .9 (H) 01/03/2020    MCH 31.2 01/03/2020    RDW 14.0 01/03/2020     01/03/2020       Lab Results   Component Value Date    PHOS 3.4 07/03/2019       Urine dipstick:   Lab Results   Component Value Date/Time    Color DARK YELLOW 12/31/2019 11:43 PM    Appearance CLOUDY (A) 12/31/2019 11:43 PM    Specific gravity 1.019 12/31/2019 11:43 PM    pH (UA) 5.0 12/31/2019 11:43 PM    Protein 300 (A) 12/31/2019 11:43 PM    Glucose NEGATIVE  12/31/2019 11:43 PM    Ketone NEGATIVE  12/31/2019 11:43 PM    Bilirubin NEGATIVE  12/31/2019 11:43 PM    Urobilinogen 1.0 12/31/2019 11:43 PM    Nitrites NEGATIVE  12/31/2019 11:43 PM    Leukocyte Esterase NEGATIVE  12/31/2019 11:43 PM    Epithelial cells FEW 12/31/2019 11:43 PM    Bacteria 2+ (A) 12/31/2019 11:43 PM    WBC 0-4 12/31/2019 11:43 PM    RBC 0-5 12/31/2019 11:43 PM               Thank you for allowing us to participate in the care of this patient. We will follow patient. Please dont hesitate to call with any questions    Ascencion Cassidy MD  1/3/2020        [facility] [address]  Phone - [phone]   Fax - [phone]  [email or web address]

## 2020-01-03 NOTE — PROGRESS NOTES
Problem: Pressure Injury - Risk of  Goal: *Prevention of pressure injury  Description  Document Chester Scale and appropriate interventions in the flowsheet. Outcome: Progressing Towards Goal  Note: Pressure Injury Interventions:  Sensory Interventions: Assess changes in LOC, Discuss PT/OT consult with provider, Minimize linen layers, Turn and reposition approx. every two hours (pillows and wedges if needed)    Moisture Interventions: Absorbent underpads, Internal/External urinary devices, Maintain skin hydration (lotion/cream), Minimize layers    Activity Interventions: Increase time out of bed, Pressure redistribution bed/mattress(bed type), PT/OT evaluation    Mobility Interventions: HOB 30 degrees or less, Pressure redistribution bed/mattress (bed type), Turn and reposition approx. every two hours(pillow and wedges)    Nutrition Interventions: Document food/fluid/supplement intake    Friction and Shear Interventions: HOB 30 degrees or less, Apply protective barrier, creams and emollients, Minimize layers, Transferring/repositioning devices     Turned pt q2hrs. Problem: Breathing Pattern - Ineffective  Goal: *Absence of hypoxia  Outcome: Progressing Towards Goal       Maintained 02 sats WDL on RA.

## 2020-01-03 NOTE — PROGRESS NOTES
[address] [facility]  Hospitalist Group                                                                                          Hospitalist Progress Note  Afsaneh Manzo MD  Answering service: [phone] OR [phone] from in house phone        Date of Service:  1/3/2020  NAME:  Russell Gomez  :  1928  MRN:  [id number]      Admission Summary:   Excerpts from the H&P  This is a 77-year-old female with past medical history significant for CKD, solitary kidney, hypertension, Parkinson's disease, dementia was brought to the emergency room with increased shortness of breath and hypoxia. She is now admitted for acute hypoxic respiratory failure. Interval history / Subjective:   Patient awake, oriented to place and person otherwise confused. She has baseline dementia. I have called and talked with her daughter who gave us consent for transfusion. Assessment & Plan:     #Pneumonia. #Hypothermia  -Suspect she has preceding viral infection precipitating the above. Rapid influenza negative  -Continue supplemental oxygen. Continue nebulizer treatment. Continue with IV levofloxacin. Short course oral steroids.  -She has received  lasix 20mg IV x 1 on admission.  - Follow up blood culture       #Acute on chronic CKD stage 3- likely prerenal. Received 1L IV fluids in ER.  - Strict I&O  - Monitor Renal function  - Avoid nephrotoxic agents. -DC IV fluids, resume losartan    #Uncontrolled hypertension  -Resumed home regimen. Adjust as needed  Losartan resumed     #Parkinson's disease. Baseline dementia. Continue Sinemet     # Anemia. Hemoglobin down to 6.4   -Patient gets monthly Procrit  -Transfuse 1 unit of blood today     # Mild Hypernatremia- likely due to dehydration. Free water by mouth. #Mild hyperkalemia: corrected. #History of renal cell carcinoma status post left partial nephrectomy. DVT prophy: SCDs  Code status: Full code     Surrogate decision maker: POA is daughter. Code status: Full  DVT prophylaxis: SCD    Care Plan discussed with: Nurse  Disposition: Patient will return to the [facility] at the [facility] Problems  Date Reviewed: 12/13/2017          Codes Class Noted POA    Hypothermia ICD-10-CM: T68. Jeffrey Bess  ICD-9-CM: 991.6  1/1/2020 Unknown        Pneumonia ICD-10-CM: J18.9  ICD-9-CM: 962  1/1/2020 Unknown                Review of Systems:   Review of systems not obtained due to patient factors. Vital Signs:    Last 24hrs VS reviewed since prior progress note. Most recent are:  Visit Vitals  /45 (BP 1 Location: Right arm, BP Patient Position: At rest)   Pulse 60   Temp 97.1 °F (36.2 °C)   Resp 18   Ht 5' 5\" (1.651 m)   Wt 62.4 kg (137 lb 8 oz)   SpO2 100%   BMI 22.88 kg/m²         Intake/Output Summary (Last 24 hours) at 1/3/2020 1038  Last data filed at 1/3/2020 0400  Gross per 24 hour   Intake 900 ml   Output 300 ml   Net 600 ml        Physical Examination:             Constitutional:  Awake but confused and disoriented. Has baseline dementia   ENT:  Oral mucous moist, oropharynx benign. Resp:  Shows diffuse expiratory wheezing   CV:  Regular rhythm, normal rate, no murmurs, gallops, rubs    GI:  Soft, non distended, non tender. normoactive bowel sounds, no hepatosplenomegaly     Musculoskeletal:  No edema, warm, 2+ pulses throughout    Neurologic:  Alert but confused disoriented. She moves all extremities symmetrically.             Data Review:    Review and/or order of clinical lab test  Review and/or order of tests in the radiology section of CPT  Review and/or order of tests in the medicine section of CPT      Labs:     Recent Labs     01/03/20  0352 01/02/20 0144   WBC 3.0* 5.3   HGB 6.4* 6.9*   HCT 21.7* 23.0*    177     Recent Labs     01/03/20  0352 01/02/20  0144 01/01/20  0311    147* 146*   K 5.0 5.2* 4.9   * 121* 120*   CO2 22 21 22   BUN 53* 53* 58*   CREA 2.07* 2.07* 2.11*   * 100 61*   CA 8.4* 8.4* 8.2*   MG  --   --  2.0     Recent Labs     12/31/19  2312   SGOT 25   ALT 7*   AP 70   TBILI 0.2   TP 6.2*   ALB 2.2*   GLOB 4.0     No results for input(s): INR, PTP, APTT, INREXT, INREXT in the last 72 hours. Recent Labs     01/02/20  1043   TIBC 116*   PSAT 27   FERR 489*      No results found for: FOL, RBCF   No results for input(s): PH, PCO2, PO2 in the last 72 hours. No results for input(s): CPK, CKNDX, TROIQ in the last 72 hours.     No lab exists for component: CPKMB  Lab Results   Component Value Date/Time    Cholesterol, total 210 (H) 12/12/2017 12:32 AM    HDL Cholesterol 63 12/12/2017 12:32 AM    LDL, calculated 133.4 (H) 12/12/2017 12:32 AM    Triglyceride 68 12/12/2017 12:32 AM    CHOL/HDL Ratio 3.3 12/12/2017 12:32 AM     Lab Results   Component Value Date/Time    Glucose (POC) 102 (H) 01/03/2020 06:36 AM    Glucose (POC) 157 (H) 01/02/2020 09:06 PM    Glucose (POC) 149 (H) 01/02/2020 04:29 PM    Glucose (POC) 109 (H) 01/02/2020 11:29 AM    Glucose (POC) 84 01/02/2020 06:25 AM     Lab Results   Component Value Date/Time    Color DARK YELLOW 12/31/2019 11:43 PM    Appearance CLOUDY (A) 12/31/2019 11:43 PM    Specific gravity 1.019 12/31/2019 11:43 PM    pH (UA) 5.0 12/31/2019 11:43 PM    Protein 300 (A) 12/31/2019 11:43 PM    Glucose NEGATIVE  12/31/2019 11:43 PM    Ketone NEGATIVE  12/31/2019 11:43 PM    Bilirubin NEGATIVE  12/31/2019 11:43 PM    Urobilinogen 1.0 12/31/2019 11:43 PM    Nitrites NEGATIVE  12/31/2019 11:43 PM    Leukocyte Esterase NEGATIVE  12/31/2019 11:43 PM    Epithelial cells FEW 12/31/2019 11:43 PM    Bacteria 2+ (A) 12/31/2019 11:43 PM    WBC 0-4 12/31/2019 11:43 PM    RBC 0-5 12/31/2019 11:43 PM         Medications Reviewed:     Current Facility-Administered Medications   Medication Dose Route Frequency    albuterol (PROVENTIL VENTOLIN) nebulizer solution 2.5 mg  2.5 mg Nebulization Q6H RT    0.9% sodium chloride infusion 250 mL  250 mL IntraVENous PRN    insulin lispro (HUMALOG) injection   SubCUTAneous AC&HS    sodium chloride (NS) flush 5-40 mL  5-40 mL IntraVENous Q8H    sodium chloride (NS) flush 5-40 mL  5-40 mL IntraVENous PRN    glucose chewable tablet 16 g  4 Tab Oral PRN    glucagon (GLUCAGEN) injection 1 mg  1 mg IntraMUSCular PRN    dextrose 10% infusion 0-250 mL  0-250 mL IntraVENous PRN    levoFLOXacin (LEVAQUIN) 500 mg in D5W IVPB  500 mg IntraVENous Q48H    alteplase (CATHFLO) 1 mg in sterile water (preservative free) 1 mL injection  1 mg InterCATHeter PRN    heparin (porcine) pf 500 Units  500 Units InterCATHeter PRN    carbidopa-levodopa (SINEMET)  mg per tablet 2 Tab  2 Tab Oral QID    lactobac ac& pc-s.therm-b.anim (EH Q/RISAQUAD)  1 Cap Oral DAILY    [Held by provider] losartan (COZAAR) tablet 50 mg  50 mg Oral DAILY    hydrALAZINE (APRESOLINE) tablet 50 mg  50 mg Oral TID    carvedilol (COREG) tablet 3.125 mg  3.125 mg Oral BID WITH MEALS    predniSONE (DELTASONE) tablet 30 mg  30 mg Oral DAILY WITH BREAKFAST    hydrALAZINE (APRESOLINE) 20 mg/mL injection 10 mg  10 mg IntraVENous Q6H PRN     ______________________________________________________________________  EXPECTED LENGTH OF STAY: 3d 7h  ACTUAL LENGTH OF STAY:          2                 Marc Larios MD

## 2020-01-04 NOTE — PROGRESS NOTES
Nephrology Progress Note  Zeus Robertson  Date of Admission : 12/31/2019    CC:  Follow up for   on CKD, anemia       Assessment and Plan      on CKD:  - likely from ATN from infection + mild volume depletion  - Cr stable, volume up today  - lasix 40mg IV daily starting today  - daily labs     Hypervolemia:  - start lasix as above    CKD III:  - baseline Cr around 1.6 to 1.7  - from DM, HTN and reduced renal mass     Hx of RCC s/p partial L nephrectomy     HTN:  - cont current meds  - can resume losartan as well     Chronic Anemia:  - hgb improved after PRBCs     Pneumonia:  - on abx per hospitalist     DM2:  - on insulin       Interval History:  Seen and examined. Feeling ok. Edema on exam. BP elevated. No cp or sob reported. Current Medications: all current  Medications have been eviewed in EPIC  Review of Systems: Pertinent items are noted in HPI. Objective:  Vitals:    Vitals:    01/04/20 0320 01/04/20 0341 01/04/20 0706 01/04/20 0931   BP: 178/74 177/64 (!) 193/95 182/75   Pulse: 66 67 68 65   Resp:  22 18    Temp:  98 °F (36.7 °C) 97.3 °F (36.3 °C)    SpO2:  95% 98%    Weight:  63 kg (138 lb 14.4 oz)     Height:         Intake and Output:  No intake/output data recorded. 01/02 1901 - 01/04 0700  In: 1199.6 [I.V.:900]  Out: 280 [Urine:280]    Physical Examination:    General: NAD,Conversant   Neck:  Supple, no mass  Resp:  Bibasilar crackles  CV:  RRR,  no murmur or rub, 2+ b/l LE edema  GI:  Soft, NT, + Bowel sounds, no hepatosplenomegaly  Neurologic:  Non focal  Psych:             AAO x 3 appropriate affect   Skin:  No Rash  :  No branch    []    High complexity decision making was performed  []    Patient is at high-risk of decompensation with multiple organ involvement    Lab Data Personally Reviewed: I have reviewed all the pertinent labs, microbiology data and radiology studies during assessment.     Recent Labs     01/04/20  0126 01/03/20  0352 01/02/20  0144    142 147*   K 4.8 5.0 5.2*   * 115* 121*   CO2 22 22 21   GLU 83 111* 100   BUN 49* 53* 53*   CREA 1.92* 2.07* 2.07*   CA 7.5* 8.4* 8.4*     Recent Labs     01/04/20  0126 01/03/20  1544 01/03/20  0352   WBC 4.8 5.0 3.0*   HGB 8.3* 8.4* 6.4*   HCT 26.4* 27.6* 21.7*    185 164     Lab Results   Component Value Date/Time    Specimen Description: CLEAN CATCH 02/19/2014 11:12 AM    Specimen Description: STOOL 03/19/2009 10:03 AM     Lab Results   Component Value Date/Time    Culture result: KLEBSIELLA PNEUMONIAE (A) 12/31/2019 11:43 PM    Culture result: NO GROWTH 2 DAYS 12/31/2019 11:12 PM    Culture result: MRSA NOT PRESENT 11/07/2018 07:38 AM    Culture result:  11/07/2018 07:38 AM         Screening of patient nares for MRSA is for surveillance purposes and, if positive, to facilitate isolation considerations in high risk settings. It is not intended for automatic decolonization interventions per se as regimens are not sufficiently effective to warrant routine use.      Recent Results (from the past 24 hour(s))   GLUCOSE, POC    Collection Time: 01/03/20 11:48 AM   Result Value Ref Range    Glucose (POC) 106 (H) 65 - 100 mg/dL    Performed by Radha Morse    CBC W/O DIFF    Collection Time: 01/03/20  3:44 PM   Result Value Ref Range    WBC 5.0 3.6 - 11.0 K/uL    RBC 2.71 (L) 3.80 - 5.20 M/uL    HGB 8.4 (L) 11.5 - 16.0 g/dL    HCT 27.6 (L) 35.0 - 47.0 %    .8 (H) 80.0 - 99.0 FL    MCH 31.0 26.0 - 34.0 PG    MCHC 30.4 30.0 - 36.5 g/dL    RDW 16.5 (H) 11.5 - 14.5 %    PLATELET 207 976 - 491 K/uL    MPV 9.9 8.9 - 12.9 FL    NRBC 0.0 0  WBC    ABSOLUTE NRBC 0.00 0.00 - 0.01 K/uL   GLUCOSE, POC    Collection Time: 01/03/20  4:25 PM   Result Value Ref Range    Glucose (POC) 138 (H) 65 - 100 mg/dL    Performed by Brant TINOCO    GLUCOSE, POC    Collection Time: 01/03/20  9:10 PM   Result Value Ref Range    Glucose (POC) 120 (H) 65 - 100 mg/dL    Performed by [id number]al, BASIC Collection Time: 01/04/20  1:26 AM   Result Value Ref Range    Sodium 143 136 - 145 mmol/L    Potassium 4.8 3.5 - 5.1 mmol/L    Chloride 118 (H) 97 - 108 mmol/L    CO2 22 21 - 32 mmol/L    Anion gap 3 (L) 5 - 15 mmol/L    Glucose 83 65 - 100 mg/dL    BUN 49 (H) 6 - 20 MG/DL    Creatinine 1.92 (H) 0.55 - 1.02 MG/DL    BUN/Creatinine ratio 26 (H) 12 - 20      GFR est AA 30 (L) >60 ml/min/1.73m2    GFR est non-AA 24 (L) >60 ml/min/1.73m2    Calcium 7.5 (L) 8.5 - 10.1 MG/DL   CBC WITH AUTOMATED DIFF    Collection Time: 01/04/20  1:26 AM   Result Value Ref Range    WBC 4.8 3.6 - 11.0 K/uL    RBC 2.65 (L) 3.80 - 5.20 M/uL    HGB 8.3 (L) 11.5 - 16.0 g/dL    HCT 26.4 (L) 35.0 - 47.0 %    MCV 99.6 (H) 80.0 - 99.0 FL    MCH 31.3 26.0 - 34.0 PG    MCHC 31.4 30.0 - 36.5 g/dL    RDW 16.4 (H) 11.5 - 14.5 %    PLATELET 164 032 - 182 K/uL    MPV 9.9 8.9 - 12.9 FL    NRBC 0.0 0  WBC    ABSOLUTE NRBC 0.00 0.00 - 0.01 K/uL    NEUTROPHILS 75 32 - 75 %    LYMPHOCYTES 17 12 - 49 %    MONOCYTES 7 5 - 13 %    EOSINOPHILS 0 0 - 7 %    BASOPHILS 0 0 - 1 %    IMMATURE GRANULOCYTES 1 (H) 0.0 - 0.5 %    ABS. NEUTROPHILS 3.5 1.8 - 8.0 K/UL    ABS. LYMPHOCYTES 0.8 0.8 - 3.5 K/UL    ABS. MONOCYTES 0.3 0.0 - 1.0 K/UL    ABS. EOSINOPHILS 0.0 0.0 - 0.4 K/UL    ABS. BASOPHILS 0.0 0.0 - 0.1 K/UL    ABS. IMM. GRANS. 0.1 (H) 0.00 - 0.04 K/UL    DF AUTOMATED     GLUCOSE, POC    Collection Time: 01/04/20  6:33 AM   Result Value Ref Range    Glucose (POC) 85 65 - 100 mg/dL    Performed by Wyline Mortimer, MD  [place] [id number]  Phone - [phone]   Fax - [phone]  [email or web address]

## 2020-01-04 NOTE — PROGRESS NOTES
Problem: Pressure Injury - Risk of  Goal: *Prevention of pressure injury  Description  Document Chester Scale and appropriate interventions in the flowsheet. Outcome: Progressing Towards Goal  Note: Pressure Injury Interventions:  Sensory Interventions: Assess changes in LOC, Assess need for specialty bed, Chair cushion, Discuss PT/OT consult with provider, Keep linens dry and wrinkle-free, Maintain/enhance activity level, Minimize linen layers, Monitor skin under medical devices, Pressure redistribution bed/mattress (bed type), Turn and reposition approx. every two hours (pillows and wedges if needed)    Moisture Interventions: Absorbent underpads, Apply protective barrier, creams and emollients, Check for incontinence Q2 hours and as needed, Internal/External urinary devices, Limit adult briefs, Maintain skin hydration (lotion/cream), Minimize layers    Activity Interventions: Assess need for specialty bed, Increase time out of bed, Pressure redistribution bed/mattress(bed type), PT/OT evaluation    Mobility Interventions: Assess need for specialty bed, Chair cushion, HOB 30 degrees or less, Pressure redistribution bed/mattress (bed type), PT/OT evaluation, Turn and reposition approx. every two hours(pillow and wedges)    Nutrition Interventions: Document food/fluid/supplement intake, Offer support with meals,snacks and hydration, Discuss nutritional consult with provider    Friction and Shear Interventions: Apply protective barrier, creams and emollients, Feet elevated on foot rest, Foam dressings/transparent film/skin sealants, Lift sheet, HOB 30 degrees or less, Minimize layers    Problem: Airway Clearance - Ineffective  Goal: *Absence of airway secretions  Outcome: Not Progressing Towards Goal  Note:   Pt has coarse upper airway. Does not effect oxygen saturations. Will continue to monitor.       Problem: Breathing Pattern - Ineffective  Goal: *Absence of hypoxia  Outcome: Progressing Towards Goal  Note:   Pts oxygen saturations remain above 95% on RA. Bedside shift change report given to Kyung Mantilla (oncoming nurse) by Evelia Khalil RN (offgoing nurse). Report included the following information SBAR, Kardex, Intake/Output, MAR, Recent Results and Cardiac Rhythm NSR/Rayshawn.

## 2020-01-04 NOTE — PROGRESS NOTES
[facility] Adult  Hospitalist Group                                                                                          Hospitalist Progress Note  Marletta Cheadle, MD  Answering service: [phone] OR [phone] from in house phone        Date of Service:  2020  NAME:  Zoltan Kern  :  1928  MRN:  [id number]      Admission Summary:   Excerpts from the H&P  This is a 49-year-old female with past medical history significant for CKD, solitary kidney, hypertension, Parkinson's disease, dementia was brought to the emergency room with increased shortness of breath and hypoxia. She is now admitted for acute hypoxic respiratory failure. Interval history / Subjective:   Patient alert but confused PCts helping her with dinner   No complaints  No family in the room  transfer to medical        Assessment & Plan:     #Pneumonia. #Hypothermia  -Suspect she has preceding viral infection precipitating the above. Rapid influenza negative  -Continue supplemental oxygen. Continue nebulizer treatment. Continue with IV levofloxacin. Short course oral steroids.  -She has received  lasix 20mg IV x 1 on admission.  - Follow up blood culture       #Acute on chronic CKD stage 3- likely prerenal. Received 1L IV fluids in ER.  - Strict I&O  - Monitor Renal function  - Avoid nephrotoxic agents. -DC IV fluids, resume losartan    #Uncontrolled hypertension  -Resumed home regimen. Adjust as needed  Losartan resumed     #Parkinson's disease. Baseline dementia. Continue Sinemet     # Anemia. Hemoglobin down to 6.4   -Patient gets monthly Procrit  -Transfuse 1 unit of blood today     # Mild Hypernatremia- likely due to dehydration. Free water by mouth. #Mild hyperkalemia: corrected. #History of renal cell carcinoma status post left partial nephrectomy. DVT prophy: SCDs  Code status: Full code     Surrogate decision maker: POA is daughter.        Code status: Full  DVT prophylaxis: SCD    Care Plan discussed with: Nurse  Disposition: Patient will return to the [address] at the [facility] Problems  Date Reviewed: 12/13/2017          Codes Class Noted POA    Hypothermia ICD-10-CM: T68. Jeffrey Bess  ICD-9-CM: 991.6  1/1/2020 Unknown        Pneumonia ICD-10-CM: J18.9  ICD-9-CM: 774  1/1/2020 Unknown                Review of Systems:   Review of systems not obtained due to patient factors. Vital Signs:    Last 24hrs VS reviewed since prior progress note. Most recent are:  Visit Vitals  /71 (BP 1 Location: Right arm, BP Patient Position: At rest)   Pulse 64   Temp 97.3 °F (36.3 °C)   Resp 22   Ht 5' 5\" (1.651 m)   Wt 63 kg (138 lb 14.4 oz)   SpO2 95%   BMI 23.11 kg/m²         Intake/Output Summary (Last 24 hours) at 1/4/2020 1451  Last data filed at 1/3/2020 2000  Gross per 24 hour   Intake 0 ml   Output 280 ml   Net -280 ml        Physical Examination:             Constitutional:  Awake but confused and disoriented. Has baseline dementia   ENT:  Oral mucous moist, oropharynx benign. Resp:  Shows diffuse expiratory wheezing   CV:  Regular rhythm, normal rate, no murmurs, gallops, rubs    GI:  Soft, non distended, non tender. normoactive bowel sounds, no hepatosplenomegaly     Musculoskeletal:  No edema, warm, 2+ pulses throughout    Neurologic:  Alert but confused disoriented. She moves all extremities symmetrically.             Data Review:    Review and/or order of clinical lab test  Review and/or order of tests in the radiology section of CPT  Review and/or order of tests in the medicine section of CPT      Labs:     Recent Labs     01/04/20  0126 01/03/20  1544   WBC 4.8 5.0   HGB 8.3* 8.4*   HCT 26.4* 27.6*    185     Recent Labs     01/04/20  0126 01/03/20  0352 01/02/20  0144    142 147*   K 4.8 5.0 5.2*   * 115* 121*   CO2 22 22 21   BUN 49* 53* 53*   CREA 1.92* 2.07* 2.07*   GLU 83 111* 100   CA 7.5* 8.4* 8.4*     No results for input(s): SGOT, GPT, ALT, AP, TBIL, TBILI, TP, ALB, GLOB, GGT, AML, LPSE in the last 72 hours. No lab exists for component: AMYP, HLPSE  No results for input(s): INR, PTP, APTT, INREXT, INREXT in the last 72 hours. Recent Labs     01/02/20  1043   TIBC 116*   PSAT 27   FERR 489*      No results found for: FOL, RBCF   No results for input(s): PH, PCO2, PO2 in the last 72 hours. No results for input(s): CPK, CKNDX, TROIQ in the last 72 hours.     No lab exists for component: CPKMB  Lab Results   Component Value Date/Time    Cholesterol, total 210 (H) 12/12/2017 12:32 AM    HDL Cholesterol 63 12/12/2017 12:32 AM    LDL, calculated 133.4 (H) 12/12/2017 12:32 AM    Triglyceride 68 12/12/2017 12:32 AM    CHOL/HDL Ratio 3.3 12/12/2017 12:32 AM     Lab Results   Component Value Date/Time    Glucose (POC) 90 01/04/2020 11:40 AM    Glucose (POC) 85 01/04/2020 06:33 AM    Glucose (POC) 120 (H) 01/03/2020 09:10 PM    Glucose (POC) 138 (H) 01/03/2020 04:25 PM    Glucose (POC) 106 (H) 01/03/2020 11:48 AM     Lab Results   Component Value Date/Time    Color DARK YELLOW 12/31/2019 11:43 PM    Appearance CLOUDY (A) 12/31/2019 11:43 PM    Specific gravity 1.019 12/31/2019 11:43 PM    pH (UA) 5.0 12/31/2019 11:43 PM    Protein 300 (A) 12/31/2019 11:43 PM    Glucose NEGATIVE  12/31/2019 11:43 PM    Ketone NEGATIVE  12/31/2019 11:43 PM    Bilirubin NEGATIVE  12/31/2019 11:43 PM    Urobilinogen 1.0 12/31/2019 11:43 PM    Nitrites NEGATIVE  12/31/2019 11:43 PM    Leukocyte Esterase NEGATIVE  12/31/2019 11:43 PM    Epithelial cells FEW 12/31/2019 11:43 PM    Bacteria 2+ (A) 12/31/2019 11:43 PM    WBC 0-4 12/31/2019 11:43 PM    RBC 0-5 12/31/2019 11:43 PM         Medications Reviewed:     Current Facility-Administered Medications   Medication Dose Route Frequency    albuterol (PROVENTIL VENTOLIN) nebulizer solution 2.5 mg  2.5 mg Nebulization Q4H PRN    hydrALAZINE (APRESOLINE) tablet 100 mg  100 mg Oral TID    carvedilol (COREG) tablet 6.25 mg  6.25 mg Oral BID WITH MEALS    furosemide (LASIX) injection 40 mg  40 mg IntraVENous DAILY    0.9% sodium chloride infusion 250 mL  250 mL IntraVENous PRN    insulin lispro (HUMALOG) injection   SubCUTAneous AC&HS    sodium chloride (NS) flush 5-40 mL  5-40 mL IntraVENous Q8H    sodium chloride (NS) flush 5-40 mL  5-40 mL IntraVENous PRN    glucose chewable tablet 16 g  4 Tab Oral PRN    glucagon (GLUCAGEN) injection 1 mg  1 mg IntraMUSCular PRN    dextrose 10% infusion 0-250 mL  0-250 mL IntraVENous PRN    levoFLOXacin (LEVAQUIN) 500 mg in D5W IVPB  500 mg IntraVENous Q48H    alteplase (CATHFLO) 1 mg in sterile water (preservative free) 1 mL injection  1 mg InterCATHeter PRN    heparin (porcine) pf 500 Units  500 Units InterCATHeter PRN    carbidopa-levodopa (SINEMET)  mg per tablet 2 Tab  2 Tab Oral QID    lactobac ac& pc-s.therm-b.anim (EH Q/RISAQUAD)  1 Cap Oral DAILY    losartan (COZAAR) tablet 50 mg  50 mg Oral DAILY    predniSONE (DELTASONE) tablet 30 mg  30 mg Oral DAILY WITH BREAKFAST    hydrALAZINE (APRESOLINE) 20 mg/mL injection 10 mg  10 mg IntraVENous Q6H PRN     ______________________________________________________________________  EXPECTED LENGTH OF STAY: 3d 7h  ACTUAL LENGTH OF STAY:          3                 Tye Man MD

## 2020-01-05 NOTE — PROGRESS NOTES
Patient arrival to the floor with elevated /61. MEWS 4. Rechecked by RN, PRN hydralyzine given. MD called and made aware.  No new orders at this time as BP normalized 150s/60s

## 2020-01-05 NOTE — PROGRESS NOTES
Bedside shift change report given to [address] [place] [address] Road (oncoming nurse) by Kilo Irvin RN (offgoing nurse). Report included the following information SBAR, Kardex, Intake/Output and MAR.

## 2020-01-05 NOTE — PROGRESS NOTES
1750: Pt's HR is 55. Notified physician. Orders received to still give the coreg and hydralazine. 1800: TRANSFER - OUT REPORT:    Verbal report given to Virginia Guaman RN (name) on Ambar Dear  being transferred to (unit) for routine progression of care       Report consisted of patients Situation, Background, Assessment and   Recommendations(SBAR). Information from the following report(s) SBAR, Kardex, Intake/Output, MAR, Recent Results and Cardiac Rhythm SB/NSR was reviewed with the receiving nurse. Opportunity for questions and clarification was provided.       Patient transported with:  Transport

## 2020-01-05 NOTE — PROGRESS NOTES
Problem: Pressure Injury - Risk of  Goal: *Prevention of pressure injury  Description  Document Chester Scale and appropriate interventions in the flowsheet. Outcome: Progressing Towards Goal  Note: Pressure Injury Interventions:  Sensory Interventions: Assess changes in LOC    Moisture Interventions: Absorbent underpads    Activity Interventions: Pressure redistribution bed/mattress(bed type)    Mobility Interventions: Turn and reposition approx. every two hours(pillow and wedges), HOB 30 degrees or less, Float heels    Nutrition Interventions: Document food/fluid/supplement intake    Friction and Shear Interventions: HOB 30 degrees or less, Minimize layers                Problem: Breathing Pattern - Ineffective  Goal: *Absence of hypoxia  Outcome: Progressing Towards Goal  Pt on RA sat's remain above 90%. Problem: Falls - Risk of  Goal: *Absence of Falls  Description  Document Easter Getting Fall Risk and appropriate interventions in the flowsheet.   Outcome: Progressing Towards Goal  Note: Fall Risk Interventions:  Mobility Interventions: Communicate number of staff needed for ambulation/transfer    Mentation Interventions: Adequate sleep, hydration, pain control, Door open when patient unattended, Reorient patient, More frequent rounding, Room close to nurse's station    Medication Interventions: Evaluate medications/consider consulting pharmacy    Elimination Interventions: Call light in reach

## 2020-01-05 NOTE — PROGRESS NOTES
Problem: Pressure Injury - Risk of  Goal: *Prevention of pressure injury  Description  Document Chester Scale and appropriate interventions in the flowsheet. Outcome: Progressing Towards Goal  Note: Pressure Injury Interventions:  Sensory Interventions: Assess changes in LOC, Discuss PT/OT consult with provider, Float heels, Turn and reposition approx. every two hours (pillows and wedges if needed)    Moisture Interventions: Check for incontinence Q2 hours and as needed, Internal/External urinary devices, Offer toileting Q_hr    Activity Interventions: Pressure redistribution bed/mattress(bed type)    Mobility Interventions: Turn and reposition approx.  every two hours(pillow and wedges), PT/OT evaluation, HOB 30 degrees or less    Nutrition Interventions: Document food/fluid/supplement intake    Friction and Shear Interventions: HOB 30 degrees or less                Problem: Patient Education: Go to Patient Education Activity  Goal: Patient/Family Education  Outcome: Progressing Towards Goal     Problem: Airway Clearance - Ineffective  Goal: *Patent airway  Outcome: Progressing Towards Goal  Goal: *Absence of airway secretions  Outcome: Progressing Towards Goal  Goal: *Able to cough effectively  Outcome: Progressing Towards Goal  Goal: *PALLIATIVE CARE:  Alleviation of secretions, cough and/or nasal congestion  Outcome: Progressing Towards Goal     Problem: Patient Education: Go to Patient Education Activity  Goal: Patient/Family Education  Outcome: Progressing Towards Goal

## 2020-01-05 NOTE — PROGRESS NOTES
[address] [facility] Adult  Hospitalist Group                                                                                          Hospitalist Progress Note  Rusty Garrido MD  Answering service: [phone] OR [phone] from in house phone        Date of Service:  2020  NAME:  Elissa Gonzalez  :  1928  MRN:  [id number]      Admission Summary:   Excerpts from the H&P  This is a 77-year-old female with past medical history significant for CKD, solitary kidney, hypertension, Parkinson's disease, dementia was brought to the emergency room with increased shortness of breath and hypoxia. She is now admitted for acute hypoxic respiratory failure. Interval history / Subjective:   Patient alert but confused. She just finished breakfast but she thought it was lunch       Assessment & Plan:     #Pneumonia. #Hypothermia, resolved  -Suspect she has preceding viral infection precipitating the above. Rapid influenza negative  -Continue supplemental oxygen. On Levaquin, last dose on . Short course oral steroids.  -She has received  lasix 20mg IV x 1 on admission.  - Follow up blood culture       #Acute on chronic CKD stage 3- likely prerenal. Received 1L IV fluids in ER.  - Strict I&O  - Monitor Renal function  - Avoid nephrotoxic agents. -DC IV fluids, resume losartan    #Uncontrolled hypertension  -Resumed home regimen. Adjust as needed  Losartan resumed. Added amlodipine as blood pressure is still very high     #Parkinson's disease. Baseline dementia. Continue Sinemet     # Anemia. Hemoglobin down to 6.4   -Patient gets monthly Procrit  -Transfused 1 unit of blood. Hemoglobin above 8 now     # Mild Hypernatremia- likely due to dehydration. Resolved    #Mild hyperkalemia: corrected. #History of renal cell carcinoma status post left partial nephrectomy. DVT prophy: SCDs  Code status: Full code     Surrogate decision maker: POA is daughter.        Code status: Full  DVT prophylaxis: SCD    Care Plan discussed with: Nurse  Disposition: Patient will return to the [facility] at the [facility], anticipate discharge tomorrow as long as her blood pressure is reasonably controlled. Hospital Problems  Date Reviewed: 12/13/2017          Codes Class Noted POA    Hypothermia ICD-10-CM: T68. Anish Samuel  ICD-9-CM: 991.6  1/1/2020 Unknown        Pneumonia ICD-10-CM: J18.9  ICD-9-CM: 402  1/1/2020 Unknown                Review of Systems:   Review of systems not obtained due to patient factors. Vital Signs:    Last 24hrs VS reviewed since prior progress note. Most recent are:  Visit Vitals  /64   Pulse 68   Temp 97.6 °F (36.4 °C)   Resp 18   Ht 5' 5\" (1.651 m)   Wt 63 kg (138 lb 14.4 oz)   SpO2 99%   BMI 23.11 kg/m²         Intake/Output Summary (Last 24 hours) at 1/5/2020 1103  Last data filed at 1/5/2020 0749  Gross per 24 hour   Intake 360 ml   Output 1450 ml   Net -1090 ml        Physical Examination:             Constitutional:  Awake but confused and disoriented. Has baseline dementia   ENT:  Oral mucous moist, oropharynx benign. Resp:  Shows diffuse expiratory wheezing   CV:  Regular rhythm, normal rate, no murmurs, gallops, rubs    GI:  Soft, non distended, non tender. normoactive bowel sounds, no hepatosplenomegaly     Musculoskeletal:  No edema, warm, 2+ pulses throughout    Neurologic:  Alert but confused disoriented. She moves all extremities symmetrically.             Data Review:    Review and/or order of clinical lab test  Review and/or order of tests in the radiology section of CPT  Review and/or order of tests in the medicine section of CPT      Labs:     Recent Labs     01/04/20  0126 01/03/20  1544   WBC 4.8 5.0   HGB 8.3* 8.4*   HCT 26.4* 27.6*    185     Recent Labs     01/04/20  0126 01/03/20  0352    142   K 4.8 5.0   * 115*   CO2 22 22   BUN 49* 53*   CREA 1.92* 2.07*   GLU 83 111*   CA 7.5* 8.4*     No results for input(s): SGOT, GPT, ALT, AP, TBIL, TBILI, TP, ALB, GLOB, GGT, AML, LPSE in the last 72 hours. No lab exists for component: AMYP, HLPSE  No results for input(s): INR, PTP, APTT, INREXT, INREXT in the last 72 hours. No results for input(s): FE, TIBC, PSAT, FERR in the last 72 hours. No results found for: FOL, RBCF   No results for input(s): PH, PCO2, PO2 in the last 72 hours. No results for input(s): CPK, CKNDX, TROIQ in the last 72 hours.     No lab exists for component: CPKMB  Lab Results   Component Value Date/Time    Cholesterol, total 210 (H) 12/12/2017 12:32 AM    HDL Cholesterol 63 12/12/2017 12:32 AM    LDL, calculated 133.4 (H) 12/12/2017 12:32 AM    Triglyceride 68 12/12/2017 12:32 AM    CHOL/HDL Ratio 3.3 12/12/2017 12:32 AM     Lab Results   Component Value Date/Time    Glucose (POC) 92 01/05/2020 10:43 AM    Glucose (POC) 93 01/05/2020 06:39 AM    Glucose (POC) 131 (H) 01/04/2020 10:08 PM    Glucose (POC) 115 (H) 01/04/2020 04:21 PM    Glucose (POC) 90 01/04/2020 11:40 AM     Lab Results   Component Value Date/Time    Color DARK YELLOW 12/31/2019 11:43 PM    Appearance CLOUDY (A) 12/31/2019 11:43 PM    Specific gravity 1.019 12/31/2019 11:43 PM    pH (UA) 5.0 12/31/2019 11:43 PM    Protein 300 (A) 12/31/2019 11:43 PM    Glucose NEGATIVE  12/31/2019 11:43 PM    Ketone NEGATIVE  12/31/2019 11:43 PM    Bilirubin NEGATIVE  12/31/2019 11:43 PM    Urobilinogen 1.0 12/31/2019 11:43 PM    Nitrites NEGATIVE  12/31/2019 11:43 PM    Leukocyte Esterase NEGATIVE  12/31/2019 11:43 PM    Epithelial cells FEW 12/31/2019 11:43 PM    Bacteria 2+ (A) 12/31/2019 11:43 PM    WBC 0-4 12/31/2019 11:43 PM    RBC 0-5 12/31/2019 11:43 PM         Medications Reviewed:     Current Facility-Administered Medications   Medication Dose Route Frequency    amLODIPine (NORVASC) tablet 10 mg  10 mg Oral DAILY    albuterol (PROVENTIL VENTOLIN) nebulizer solution 2.5 mg  2.5 mg Nebulization Q4H PRN    hydrALAZINE (APRESOLINE) tablet 100 mg  100 mg Oral TID    carvedilol (COREG) tablet 6.25 mg  6.25 mg Oral BID WITH MEALS    furosemide (LASIX) injection 40 mg  40 mg IntraVENous DAILY    0.9% sodium chloride infusion 250 mL  250 mL IntraVENous PRN    insulin lispro (HUMALOG) injection   SubCUTAneous AC&HS    sodium chloride (NS) flush 5-40 mL  5-40 mL IntraVENous Q8H    sodium chloride (NS) flush 5-40 mL  5-40 mL IntraVENous PRN    glucose chewable tablet 16 g  4 Tab Oral PRN    glucagon (GLUCAGEN) injection 1 mg  1 mg IntraMUSCular PRN    dextrose 10% infusion 0-250 mL  0-250 mL IntraVENous PRN    levoFLOXacin (LEVAQUIN) 500 mg in D5W IVPB  500 mg IntraVENous Q48H    alteplase (CATHFLO) 1 mg in sterile water (preservative free) 1 mL injection  1 mg InterCATHeter PRN    heparin (porcine) pf 500 Units  500 Units InterCATHeter PRN    carbidopa-levodopa (SINEMET)  mg per tablet 2 Tab  2 Tab Oral QID    lactobac ac& pc-s.therm-b.anim (EH Q/RISAQUAD)  1 Cap Oral DAILY    losartan (COZAAR) tablet 50 mg  50 mg Oral DAILY    hydrALAZINE (APRESOLINE) 20 mg/mL injection 10 mg  10 mg IntraVENous Q6H PRN     ______________________________________________________________________  EXPECTED LENGTH OF STAY: 3d 7h  ACTUAL LENGTH OF STAY:          4                 Elizbeth Dakins, MD

## 2020-01-05 NOTE — PROGRESS NOTES
Nephrology Progress Note  Virginia Isha  Date of Admission : 12/31/2019    CC:  Follow up for   on CKD, anemia       Assessment and Plan      on CKD:  - likely from ATN from infection + mild volume depletion  - Cr stable  - change to po lasix today  - labs in AM     Hypervolemia:  - lasix 40mg po daily    CKD III:  - baseline Cr around 1.6 to 1.7  - from DM, HTN and reduced renal mass     Hx of RCC s/p partial L nephrectomy     HTN:  - cont current meds  - can resume losartan as well     Chronic Anemia:  - hgb improved after PRBCs  - start IDA tomorrow     Pneumonia:  - on abx per hospitalist     DM2:  - on insulin       Interval History:  Seen and examined. Feeling ok. Resting, in NAD. No labs today    Current Medications: all current  Medications have been eviewed in EPIC  Review of Systems: Pertinent items are noted in HPI. Objective:  Vitals:    Vitals:    01/04/20 2315 01/05/20 0054 01/05/20 0416 01/05/20 0749   BP: 182/66 169/78 181/68 168/64   Pulse: 62 (!) 55 (!) 55 68   Resp:  16 16 18   Temp:  97.9 °F (36.6 °C) 97.9 °F (36.6 °C) 97.6 °F (36.4 °C)   SpO2:  100% 98% 99%   Weight:       Height:         Intake and Output:  01/05 0701 - 01/05 1900  In: 120 [P.O.:120]  Out: 200 [Urine:200]  01/03 1901 - 01/05 0700  In: 240 [P.O.:240]  Out: 1250 [Urine:1250]    Physical Examination:    General: NAD,Conversant   Neck:  Supple, no mass  Resp:  Bibasilar crackles  CV:  RRR,  no murmur or rub, 2+ b/l LE edema  GI:  Soft, NT, + Bowel sounds, no hepatosplenomegaly  Neurologic:  Non focal  Psych:             AAO x 3 appropriate affect   Skin:  No Rash  :  No branch    []    High complexity decision making was performed  []    Patient is at high-risk of decompensation with multiple organ involvement    Lab Data Personally Reviewed: I have reviewed all the pertinent labs, microbiology data and radiology studies during assessment.     Recent Labs     01/04/20  0126 01/03/20  0352    142   K 4.8 5.0   CL 118* 115*   CO2 22 22   GLU 83 111*   BUN 49* 53*   CREA 1.92* 2.07*   CA 7.5* 8.4*     Recent Labs     01/04/20  0126 01/03/20  1544 01/03/20  0352   WBC 4.8 5.0 3.0*   HGB 8.3* 8.4* 6.4*   HCT 26.4* 27.6* 21.7*    185 164     Lab Results   Component Value Date/Time    Specimen Description: CLEAN CATCH 02/19/2014 11:12 AM    Specimen Description: STOOL 03/19/2009 10:03 AM     Lab Results   Component Value Date/Time    Culture result: KLEBSIELLA PNEUMONIAE (A) 12/31/2019 11:43 PM    Culture result: NO GROWTH 4 DAYS 12/31/2019 11:12 PM    Culture result: MRSA NOT PRESENT 11/07/2018 07:38 AM    Culture result:  11/07/2018 07:38 AM         Screening of patient nares for MRSA is for surveillance purposes and, if positive, to facilitate isolation considerations in high risk settings. It is not intended for automatic decolonization interventions per se as regimens are not sufficiently effective to warrant routine use. Recent Results (from the past 24 hour(s))   GLUCOSE, POC    Collection Time: 01/04/20  4:21 PM   Result Value Ref Range    Glucose (POC) 115 (H) 65 - 100 mg/dL    Performed by Brant TINOCO    GLUCOSE, POC    Collection Time: 01/04/20 10:08 PM   Result Value Ref Range    Glucose (POC) 131 (H) 65 - 100 mg/dL    Performed by Alejandro Mike    GLUCOSE, POC    Collection Time: 01/05/20  6:39 AM   Result Value Ref Range    Glucose (POC) 93 65 - 100 mg/dL    Performed by Gino Cox    GLUCOSE, POC    Collection Time: 01/05/20 10:43 AM   Result Value Ref Range    Glucose (POC) 92 65 - 100 mg/dL    Performed by Celio Garrido    GLUCOSE, POC    Collection Time: 01/05/20 11:28 AM   Result Value Ref Range    Glucose (POC) 86 65 - 100 mg/dL    Performed by Obi Dean MD  [facility]   [address] [address]  Phone - [phone]   Fax - [phone]  [email or web address]

## 2020-01-05 NOTE — PROGRESS NOTES
Bedside shift change report given to Clemencia Vo RN (oncoming nurse) by Chanelle Fang RN (offgoing nurse). Report included the following information SBAR, Kardex, Intake/Output, MAR and Recent Results.

## 2020-01-06 NOTE — PROGRESS NOTES
Clinical Pharmacy Note: Re: IV to PO Automatic Conversion - Antibiotic    Please note: Nate Moctezuma medication levofloxacin has been changed from IV to PO based on the following criteria:    The patient:  1. Has received IV therapy for at least 48 hours   2. Has a functioning GI tract  - Taking scheduled oral medications  - Tolerating tube feeds at goal rate or a full liquid, soft, or regular diet         3. Is clinically stable        - Temperature < 100.4F for at least 24 hours        - WBC is trending down    This IV to PO conversion is based on the [employer] approved automatic conversion policy for eligible patients. Please call with questions.     Jassi Galvan, PHARMD

## 2020-01-06 NOTE — PROGRESS NOTES
[address] [facility] Adult  Hospitalist Group                                                                                          Hospitalist Progress Note  Bernadine Hess MD  Answering service: [phone] OR [phone] from in house phone        Date of Service:  2020  NAME:  Annette Knight  :  1928  MRN:  [id number]      Admission Summary:   Excerpts from the H&P  This is a 27-year-old female with past medical history significant for CKD, solitary kidney, hypertension, Parkinson's disease, dementia was brought to the emergency room with increased shortness of breath and hypoxia. She is now admitted for acute hypoxic respiratory failure. Interval history / Subjective:   Patient alert but confused. She just finished breakfast but she thought it was lunch       Assessment & Plan:     #Pneumonia. #Hypothermia, resolved  -Suspect she has preceding viral infection precipitating the above. Rapid influenza negative  -Continue supplemental oxygen. On Levaquin, last dose on . Short course oral steroids.  -She has received  lasix 20mg IV x 1 on admission.  - Follow up blood culture       #Acute on chronic CKD stage 3- likely prerenal. Received 1L IV fluids in ER.  - Strict I&O  - Monitor Renal function  - Avoid nephrotoxic agents. -DC IV fluids, resume losartan  -Creatinine bump and lasix held. -BMP in AM    #Mild hyperkalemia.K 5.2.repeat bmp in am    #Uncontrolled hypertension  -Resumed home regimen. Adjust as needed  Losartan resumed. Added amlodipine as blood pressure is still very high     #Parkinson's disease. Baseline dementia. Continue Sinemet     # Anemia. Hemoglobin down to 6.4   -Patient gets monthly Procrit  -Transfused 1 unit of blood. Hemoglobin above 8 now     # Mild Hypernatremia- likely due to dehydration. corrected         #History of renal cell carcinoma status post left partial nephrectomy.      DVT prophy: SCDs  Code status: Full code     Surrogate decision maker: POA is daughter. Code status: Full  DVT prophylaxis: SCD    Care Plan discussed with: Nurse  Disposition: Patient will return to the [facility] at the [facility] Problems  Date Reviewed: 12/13/2017          Codes Class Noted POA    Hypothermia ICD-10-CM: T68Taylor Myrick  ICD-9-CM: 991.6  1/1/2020 Unknown        Pneumonia ICD-10-CM: J18.9  ICD-9-CM: 316  1/1/2020 Unknown                Review of Systems:   Review of systems not obtained due to patient factors. Vital Signs:    Last 24hrs VS reviewed since prior progress note. Most recent are:  Visit Vitals  /75   Pulse (!) 57   Temp 97.6 °F (36.4 °C)   Resp 16   Ht 5' 5\" (1.651 m)   Wt 63 kg (138 lb 14.4 oz)   SpO2 97%   BMI 23.11 kg/m²         Intake/Output Summary (Last 24 hours) at 1/6/2020 1741  Last data filed at 1/6/2020 2370  Gross per 24 hour   Intake    Output 400 ml   Net -400 ml        Physical Examination:             Constitutional:  Awake but confused and disoriented. Has baseline dementia   ENT:  Oral mucous moist, oropharynx benign. Resp:  Shows diffuse expiratory wheezing   CV:  Regular rhythm, normal rate, no murmurs, gallops, rubs    GI:  Soft, non distended, non tender. normoactive bowel sounds, no hepatosplenomegaly     Musculoskeletal:  No edema, warm, 2+ pulses throughout    Neurologic:  Alert but confused disoriented. She moves all extremities symmetrically.             Data Review:    Review and/or order of clinical lab test  Review and/or order of tests in the radiology section of CPT  Review and/or order of tests in the medicine section of CPT      Labs:     Recent Labs     01/06/20 0457 01/04/20 0126   WBC 6.9 4.8   HGB 9.0* 8.3*   HCT 28.9* 26.4*    181     Recent Labs     01/06/20 0457 01/04/20 0126    143   K 5.2* 4.8   * 118*   CO2 25 22   BUN 56* 49*   CREA 2.19* 1.92*   GLU 76 83   CA 8.6 7.5*   PHOS 3.6  --      Recent Labs     01/06/20 0457   ALB 2.1*     No results for input(s): INR, PTP, APTT, INREXT, INREXT in the last 72 hours. No results for input(s): FE, TIBC, PSAT, FERR in the last 72 hours. No results found for: FOL, RBCF   No results for input(s): PH, PCO2, PO2 in the last 72 hours. No results for input(s): CPK, CKNDX, TROIQ in the last 72 hours.     No lab exists for component: CPKMB  Lab Results   Component Value Date/Time    Cholesterol, total 210 (H) 12/12/2017 12:32 AM    HDL Cholesterol 63 12/12/2017 12:32 AM    LDL, calculated 133.4 (H) 12/12/2017 12:32 AM    Triglyceride 68 12/12/2017 12:32 AM    CHOL/HDL Ratio 3.3 12/12/2017 12:32 AM     Lab Results   Component Value Date/Time    Glucose (POC) 79 01/06/2020 04:19 PM    Glucose (POC) 80 01/06/2020 11:09 AM    Glucose (POC) 83 01/06/2020 06:28 AM    Glucose (POC) 72 01/06/2020 06:07 AM    Glucose (POC) 112 (H) 01/05/2020 09:14 PM     Lab Results   Component Value Date/Time    Color DARK YELLOW 12/31/2019 11:43 PM    Appearance CLOUDY (A) 12/31/2019 11:43 PM    Specific gravity 1.019 12/31/2019 11:43 PM    pH (UA) 5.0 12/31/2019 11:43 PM    Protein 300 (A) 12/31/2019 11:43 PM    Glucose NEGATIVE  12/31/2019 11:43 PM    Ketone NEGATIVE  12/31/2019 11:43 PM    Bilirubin NEGATIVE  12/31/2019 11:43 PM    Urobilinogen 1.0 12/31/2019 11:43 PM    Nitrites NEGATIVE  12/31/2019 11:43 PM    Leukocyte Esterase NEGATIVE  12/31/2019 11:43 PM    Epithelial cells FEW 12/31/2019 11:43 PM    Bacteria 2+ (A) 12/31/2019 11:43 PM    WBC 0-4 12/31/2019 11:43 PM    RBC 0-5 12/31/2019 11:43 PM         Medications Reviewed:     Current Facility-Administered Medications   Medication Dose Route Frequency    levoFLOXacin (LEVAQUIN) tablet 500 mg  500 mg Oral Q48H    amLODIPine (NORVASC) tablet 10 mg  10 mg Oral DAILY    epoetin jones-epbx (RETACRIT) injection 10,000 Units  10,000 Units SubCUTAneous Q MON, WED & FRI    albuterol (PROVENTIL VENTOLIN) nebulizer solution 2.5 mg  2.5 mg Nebulization Q4H PRN    hydrALAZINE (APRESOLINE) tablet 100 mg  100 mg Oral TID    carvedilol (COREG) tablet 6.25 mg  6.25 mg Oral BID WITH MEALS    0.9% sodium chloride infusion 250 mL  250 mL IntraVENous PRN    insulin lispro (HUMALOG) injection   SubCUTAneous AC&HS    sodium chloride (NS) flush 5-40 mL  5-40 mL IntraVENous Q8H    sodium chloride (NS) flush 5-40 mL  5-40 mL IntraVENous PRN    glucose chewable tablet 16 g  4 Tab Oral PRN    glucagon (GLUCAGEN) injection 1 mg  1 mg IntraMUSCular PRN    dextrose 10% infusion 0-250 mL  0-250 mL IntraVENous PRN    alteplase (CATHFLO) 1 mg in sterile water (preservative free) 1 mL injection  1 mg InterCATHeter PRN    heparin (porcine) pf 500 Units  500 Units InterCATHeter PRN    carbidopa-levodopa (SINEMET)  mg per tablet 2 Tab  2 Tab Oral QID    lactobac ac& pc-s.therm-b.anim (EH Q/RISAQUAD)  1 Cap Oral DAILY    losartan (COZAAR) tablet 50 mg  50 mg Oral DAILY    hydrALAZINE (APRESOLINE) 20 mg/mL injection 10 mg  10 mg IntraVENous Q6H PRN     ______________________________________________________________________  EXPECTED LENGTH OF STAY: 3d 7h  ACTUAL LENGTH OF STAY:          5                 Kalpana Lopez MD

## 2020-01-06 NOTE — PROGRESS NOTES
Nephrology Progress Note  Russell Gomez  Date of Admission : 12/31/2019    CC:  Follow up for   on CKD, anemia       Assessment and Plan      on CKD:  - getting volume depleted   - stopped lasix   - will need a lowered dose of lasix on d/c -- 40 mg MWF   - labs again in am     CKD III:  - baseline Cr around 1.6 to 1.7  - from DM, HTN and reduced renal mass     Hx of RCC s/p partial L nephrectomy     HTN:  - cont current meds  - can resume losartan as well     Chronic Anemia:  - hgb improved after PRBCs  - start IDA tomorrow     Pneumonia:  - on abx per hospitalist     DM2:  - on insulin       Interval History:  Seen and examined. She was able to recognize me and say that she has not seen me in a while. She has no complaints     Current Medications: all current  Medications have been eviewed in EPIC  Review of Systems: Pertinent items are noted in HPI. Objective:  Vitals:    Vitals:    01/05/20 2321 01/06/20 0433 01/06/20 0718 01/06/20 0754   BP: 175/75 171/68 167/72 159/77   Pulse: 67 (!) 59 61 66   Resp: 16 18  17   Temp: 98 °F (36.7 °C) 98.2 °F (36.8 °C)  97.7 °F (36.5 °C)   SpO2: 99% 99%  97%   Weight:       Height:         Intake and Output:  01/06 0701 - 01/06 1900  In: -   Out: 100 [Urine:100]  01/04 1901 - 01/06 0700  In: 840 [P.O.:840]  Out: 1100 [Urine:1100]    Physical Examination:    General: NAD,Conversant   Neck:  Supple, no mass  Resp:  CTA  CV:  RRR,  no murmur or rub, trace b/l LE edema  GI:  Soft, NT, + Bowel sounds, no hepatosplenomegaly  Neurologic:  Non focal  Psych:             AAO x 3 appropriate affect   Skin:  No Rash  :  No branch    []    High complexity decision making was performed  []    Patient is at high-risk of decompensation with multiple organ involvement    Lab Data Personally Reviewed: I have reviewed all the pertinent labs, microbiology data and radiology studies during assessment.     Recent Labs     01/06/20  0457 01/04/20  0126    143   K 5.2* 4.8   * 118*   CO2 25 22   GLU 76 83   BUN 56* 49*   CREA 2.19* 1.92*   CA 8.6 7.5*   PHOS 3.6  --    ALB 2.1*  --      Recent Labs     01/06/20  0457 01/04/20  0126 01/03/20  1544   WBC 6.9 4.8 5.0   HGB 9.0* 8.3* 8.4*   HCT 28.9* 26.4* 27.6*    181 185     Lab Results   Component Value Date/Time    Specimen Description: CLEAN CATCH 02/19/2014 11:12 AM    Specimen Description: STOOL 03/19/2009 10:03 AM     Lab Results   Component Value Date/Time    Culture result: KLEBSIELLA PNEUMONIAE (A) 12/31/2019 11:43 PM    Culture result: NO GROWTH 5 DAYS 12/31/2019 11:12 PM    Culture result: MRSA NOT PRESENT 11/07/2018 07:38 AM    Culture result:  11/07/2018 07:38 AM         Screening of patient nares for MRSA is for surveillance purposes and, if positive, to facilitate isolation considerations in high risk settings. It is not intended for automatic decolonization interventions per se as regimens are not sufficiently effective to warrant routine use.      Recent Results (from the past 24 hour(s))   GLUCOSE, POC    Collection Time: 01/05/20  4:09 PM   Result Value Ref Range    Glucose (POC) 111 (H) 65 - 100 mg/dL    Performed by Socorro Velazquez, POC    Collection Time: 01/05/20  9:14 PM   Result Value Ref Range    Glucose (POC) 112 (H) 65 - 100 mg/dL    Performed by Kishore Matthews    RENAL FUNCTION PANEL    Collection Time: 01/06/20  4:57 AM   Result Value Ref Range    Sodium 143 136 - 145 mmol/L    Potassium 5.2 (H) 3.5 - 5.1 mmol/L    Chloride 114 (H) 97 - 108 mmol/L    CO2 25 21 - 32 mmol/L    Anion gap 4 (L) 5 - 15 mmol/L    Glucose 76 65 - 100 mg/dL    BUN 56 (H) 6 - 20 MG/DL    Creatinine 2.19 (H) 0.55 - 1.02 MG/DL    BUN/Creatinine ratio 26 (H) 12 - 20      GFR est AA 25 (L) >60 ml/min/1.73m2    GFR est non-AA 21 (L) >60 ml/min/1.73m2    Calcium 8.6 8.5 - 10.1 MG/DL    Phosphorus 3.6 2.6 - 4.7 MG/DL    Albumin 2.1 (L) 3.5 - 5.0 g/dL   CBC W/O DIFF    Collection Time: 01/06/20  4:57 AM   Result Value Ref Range WBC 6.9 3.6 - 11.0 K/uL    RBC 2.91 (L) 3.80 - 5.20 M/uL    HGB 9.0 (L) 11.5 - 16.0 g/dL    HCT 28.9 (L) 35.0 - 47.0 %    MCV 99.3 (H) 80.0 - 99.0 FL    MCH 30.9 26.0 - 34.0 PG    MCHC 31.1 30.0 - 36.5 g/dL    RDW 15.2 (H) 11.5 - 14.5 %    PLATELET 826 298 - 568 K/uL    MPV 10.5 8.9 - 12.9 FL    NRBC 0.0 0  WBC    ABSOLUTE NRBC 0.00 0.00 - 0.01 K/uL   GLUCOSE, POC    Collection Time: 01/06/20  6:07 AM   Result Value Ref Range    Glucose (POC) 72 65 - 100 mg/dL    Performed by Kane Cortes, POC    Collection Time: 01/06/20  6:28 AM   Result Value Ref Range    Glucose (POC) 83 65 - 100 mg/dL    Performed by Kane Cortes, POC    Collection Time: 01/06/20 11:09 AM   Result Value Ref Range    Glucose (POC) 80 65 - 100 mg/dL    Performed by Yari Thomas MD  [address] [place] [address]  Phone - [phone]   Fax - [phone]  www. [email or web address]

## 2020-01-06 NOTE — PROGRESS NOTES
Bedside shift change report given to Abdiel Dan (oncoming nurse) by Mayte Holley RN (offgoing nurse). Report included the following information SBAR, Kardex, Intake/Output, MAR and Recent Results.

## 2020-01-06 NOTE — PROGRESS NOTES
Bedside shift change report given to [place] (oncoming nurse) by Paula Denver RN (offgoing nurse). Report included the following information SBAR, Kardex, Intake/Output and MAR.

## 2020-01-07 NOTE — PROGRESS NOTES
Daughter called for updates at this time. In regard to poor appetite the daughter told this nurse that her mother loves soups especially [place] Stew and Cream of broccoli. This nurse to call dietary for dinner order.

## 2020-01-07 NOTE — PROGRESS NOTES
Bedside shift change report given to Roni (oncoming nurse) by Leonor Sandhoff (offgoing nurse). Report included the following information SBAR.

## 2020-01-07 NOTE — PROGRESS NOTES
Patient blood sugar 80 at this time. Patient given apple juice. Will recheck in 15 minutes. 1136: Patient sugar up to 102. Will continue to monitor.

## 2020-01-07 NOTE — PROGRESS NOTES
NUTRITION COMPLETE ASSESSMENT    RECOMMENDATIONS:   1. Prompting and assistance at all meals - total feed  2. Use supplements to given medications      Interventions/Plan:   Food/Nutrient Delivery:  (diet adjusted) Commercial supplement(Nepro BID) Feeding assistance at meals        Assessment:   Reason for Assessment: LOS and At Nutrition Risk    Diet: cardiac  Supplements: None  Nutritionally Significant Medications: [x] Reviewed & Includes: sinemt, coreg, retacrit, lasix, SSI, iron, panfilo-Q, levaquin, veltassa 3x/week  Meal Intake:   Patient Vitals for the past 100 hrs:   % Diet Eaten   01/07/20 1442 5 %   01/07/20 1000 20 %   01/05/20 1732 25 %   01/05/20 1230 25 %   01/05/20 0749 10 %   01/04/20 1906 25 %     Pre-Hospitalization:  Diet at Home: renal  Vitamins/Supplements: Yes(Nepro BID)    Current Hospitalization:   Appetite: Poor  PO Ability: Total feed Average po intake:Less than 25%  Average supplements intake:        Subjective: Confused, difficult to understand. Objective:  Pt admitted for hypoxia. PMHx: CKD, dementia, Parkinson's disease, anemia and hypertension. PMA on admit with abx complete today.  on CKD on admit but stable. Hyperkalemia noted. Hypernatremia likely d/t dehydration now resolved. Poor PO intake this admit. Only bites of food at one time per RN reports but then would often eat a few more bites in a little bit. On renal diet at facility with Nepro shakes BID. Pt visited but with dementia unable to provide hx of prefernces. RN notes family reporting that pt likes most soups. Will resume PTA supplements of Nepro BID (850kcal 38g protein) with low potassium diet. Preferences updated. Predict oral intake to continue to be low with baseline dementia. Would not recommend nutrition support even if intake inadequate for the same reason as well as advanced age.      Estimated Nutrition Needs:   Kcals/day: 4393 Kcals/day(1245-1349kcal)  Protein: 50 g(0.8g/kg)  Fluid: 1500 ml  Based On: Abimael Romo(x 1.2-1.3)  Weight Used: Actual wt(63kg)    Pt expected to meet estimated nutrient needs:  []   Yes     []  No [] Unable to predict at this time  Nutrition Diagnosis:   1. Inadequate oral intake related to poor appetite ? 2/2 dementia as evidenced by less than 25% meals consumed; parkinson's and dementia hx    2. Inconsistent carbohydrate intake related to poor appetite as evidenced by hypoglycemia with poor PO    3. Altered nutrition-related lab values related to  on CKD as evidenced by hyperkalemia; pt hx    Goals:     Consumption of at least 50% trays and 1 supplement/day in 5-7 days     Monitoring & Evaluation:    - Total energy intake, Liquid meal replacement, Protein intake, Carbohydrate intake   - Weight/weight change, Glucose profile, Electrolyte and renal profile    Previous Nutrition Goals Met:   N/A  Previous Recommendations:    N/A    Education & Discharge Needs:   [x] None Identified   [] Identified and addressed    [x] Participated in care plan, discharge planning, and/or interdisciplinary rounds        Cultural, Christian and ethnic food preferences identified: None    Skin Integrity: [x]Intact  []Other  Edema: [x]None []Other  Last BM: 1/3  Food Allergies: [x]None []Other  Diet Restrictions: Cultural/Orthodoxy Preference(s): None     Anthropometrics:    Weight Loss Metrics 1/4/2020 5/18/2019 11/6/2018 11/3/2018 12/13/2017 5/25/2016 12/3/2015   Today's Wt 138 lb 14.4 oz 145 lb - 125 lb 129 lb 4.8 oz 134 lb 122 lb   BMI 23.11 kg/m2 24.13 kg/m2 21.46 kg/m2 21.46 kg/m2 22.19 kg/m2 22.99 kg/m2 20.93 kg/m2      Weight Source: Bed  Height: 5' 5\" (165.1 cm),    Body mass index is 23.11 kg/m².      IBW : 56.7 kg (125 lb), % IBW (Calculated): 111.12 %   ,      Labs:    Lab Results   Component Value Date/Time    Sodium 145 01/07/2020 02:01 AM    Potassium 5.2 (H) 01/07/2020 02:01 AM    Chloride 115 (H) 01/07/2020 02:01 AM    CO2 26 01/07/2020 02:01 AM    Glucose 67 01/07/2020 02:01 AM BUN 50 (H) 01/07/2020 02:01 AM    Creatinine 1.99 (H) 01/07/2020 02:01 AM    Calcium 8.4 (L) 01/07/2020 02:01 AM    Magnesium 2.0 01/01/2020 03:11 AM    Phosphorus 3.6 01/06/2020 04:57 AM    Albumin 2.1 (L) 01/06/2020 04:57 AM     No results found for: HBA1C, HGBE8, TII3GYXO, XGR6UTGG    Madonna Jenkins RD [facility], Pager #[phone] or via [employer]

## 2020-01-07 NOTE — PROGRESS NOTES
[facility]  Hospitalist Group                                                                                          Hospitalist Progress Note  Marla Cooney MD  Answering service: [phone] OR [phone] from in house phone        Date of Service:  2020  NAME:  Virginia Isha  :  1928  MRN:  [id number]      Admission Summary:   Excerpts from the H&P  This is a 80-year-old female with past medical history significant for CKD, solitary kidney, hypertension, Parkinson's disease, dementia was brought to the emergency room with increased shortness of breath and hypoxia. She is now admitted for acute hypoxic respiratory failure. Interval history / Subjective:   Patient alert . No complaints today   placemement  Will be discharged tomorrow likely        Assessment & Plan:     #Pneumonia. #Hypothermia, resolved  -Suspect she has preceding viral infection precipitating the above. Rapid influenza negative  -Continue supplemental oxygen. On Levaquin, last dose on . Short course oral steroids.  -She has received  lasix 20mg IV x 1 on admission.  - Follow up blood culture       #Acute on chronic CKD stage 3- likely prerenal. Received 1L IV fluids in ER. CKD stage 3  - Strict I&O  - Monitor Renal function  - Avoid nephrotoxic agents. -DC IV fluids, resume losartan  -BMP in AM  -lasix as per nephrology  . Ok to discharge as per nephrology     #Mild hyperkalemia.K 5.2.repeat bmp in am    #Uncontrolled hypertension  -Resumed home regimen. Adjust as needed  Losartan resumed. Added amlodipine as blood pressure is still very high     #Parkinson's disease. Baseline dementia. Continue Sinemet     # Anemia. Hemoglobin down to 6.4   -Patient gets monthly Procrit  -Transfused 1 unit of blood. Hemoglobin above 8 now     # Mild Hypernatremia- likely due to dehydration. corrected         #History of renal cell carcinoma status post left partial nephrectomy.      DVT prophy: SCDs  Code status: Full code     Surrogate decision maker: POA is daughter. Code status: Full  DVT prophylaxis: SCD    Care Plan discussed with: Nurse  Disposition: Patient will [facility] the [facility] the [facility] Problems  Date Reviewed: 12/13/2017          Codes Class Noted POA    Hypothermia ICD-10-CM: [id number] Manriquez  ICD-9-CM: 991.6  1/1/2020 Unknown        Pneumonia ICD-10-CM: J18.9  ICD-9-CM: 862  1/1/2020 Unknown                Review of Systems:   Review of systems not obtained due to patient factors. Vital Signs:    Last 24hrs VS reviewed since prior progress note. Most recent are:  Visit Vitals  /64 (BP 1 Location: Right arm, BP Patient Position: At rest)   Pulse (!) 59   Temp 97.8 °F (36.6 °C)   Resp 16   Ht 5' 5\" (1.651 m)   Wt 63 kg (138 lb 14.4 oz)   SpO2 96%   BMI 23.11 kg/m²         Intake/Output Summary (Last 24 hours) at 1/7/2020 1827  Last data filed at 1/7/2020 1508  Gross per 24 hour   Intake 50 ml   Output 750 ml   Net -700 ml        Physical Examination:             Constitutional:  Awake but confused and disoriented. Has baseline dementia   ENT:  Oral mucous moist, oropharynx benign. Resp:  Shows diffuse expiratory wheezing   CV:  Regular rhythm, normal rate, no murmurs, gallops, rubs    GI:  Soft, non distended, non tender. normoactive bowel sounds, no hepatosplenomegaly     Musculoskeletal:  No edema, warm, 2+ pulses throughout    Neurologic:  Alert but confused disoriented. She moves all extremities symmetrically.             Data Review:    Review and/or order of clinical lab test  Review and/or order of tests in the radiology section of CPT  Review and/or order of tests in the medicine section of CPT      Labs:     Recent Labs     01/07/20  0201 01/06/20 0457   WBC 6.3 6.9   HGB 8.5* 9.0*   HCT 27.8* 28.9*    221     Recent Labs     01/07/20  0201 01/06/20  0457    143   K 5.2* 5.2*   * 114*   CO2 26 25   BUN 50* 56*   CREA 1.99* 2.19*   GLU 67 76   CA 8.4* 8.6   PHOS  --  3.6     Recent Labs     01/06/20  0457   ALB 2.1*     No results for input(s): INR, PTP, APTT, INREXT, INREXT in the last 72 hours. No results for input(s): FE, TIBC, PSAT, FERR in the last 72 hours. No results found for: FOL, RBCF   No results for input(s): PH, PCO2, PO2 in the last 72 hours. No results for input(s): CPK, CKNDX, TROIQ in the last 72 hours.     No lab exists for component: CPKMB  Lab Results   Component Value Date/Time    Cholesterol, total 210 (H) 12/12/2017 12:32 AM    HDL Cholesterol 63 12/12/2017 12:32 AM    LDL, calculated 133.4 (H) 12/12/2017 12:32 AM    Triglyceride 68 12/12/2017 12:32 AM    CHOL/HDL Ratio 3.3 12/12/2017 12:32 AM     Lab Results   Component Value Date/Time    Glucose (POC) 89 01/07/2020 04:49 PM    Glucose (POC) 77 01/07/2020 04:31 PM    Glucose (POC) 75 01/07/2020 04:08 PM    Glucose (POC) 102 (H) 01/07/2020 11:36 AM    Glucose (POC) 80 01/07/2020 11:11 AM     Lab Results   Component Value Date/Time    Color DARK YELLOW 12/31/2019 11:43 PM    Appearance CLOUDY (A) 12/31/2019 11:43 PM    Specific gravity 1.019 12/31/2019 11:43 PM    pH (UA) 5.0 12/31/2019 11:43 PM    Protein 300 (A) 12/31/2019 11:43 PM    Glucose NEGATIVE  12/31/2019 11:43 PM    Ketone NEGATIVE  12/31/2019 11:43 PM    Bilirubin NEGATIVE  12/31/2019 11:43 PM    Urobilinogen 1.0 12/31/2019 11:43 PM    Nitrites NEGATIVE  12/31/2019 11:43 PM    Leukocyte Esterase NEGATIVE  12/31/2019 11:43 PM    Epithelial cells FEW 12/31/2019 11:43 PM    Bacteria 2+ (A) 12/31/2019 11:43 PM    WBC 0-4 12/31/2019 11:43 PM    RBC 0-5 12/31/2019 11:43 PM         Medications Reviewed:     Current Facility-Administered Medications   Medication Dose Route Frequency    [START ON 1/8/2020] patiromer calcium sorbitex (VELTASSA) powder 8.4 g  8.4 g Oral Q MON, WED & FRI    [START ON 1/8/2020] furosemide (LASIX) tablet 40 mg  40 mg Oral Q MON, WED & FRI    [START ON 1/13/2020] epoetin jones-epbx (RETACRIT) injection 20,000 Units  20,000 Units SubCUTAneous Q7D    iron sucrose (VENOFER) 300 mg in 0.9% sodium chloride 250 mL IVPB  300 mg IntraVENous Q24H    levoFLOXacin (LEVAQUIN) tablet 500 mg  500 mg Oral Q48H    amLODIPine (NORVASC) tablet 10 mg  10 mg Oral DAILY    albuterol (PROVENTIL VENTOLIN) nebulizer solution 2.5 mg  2.5 mg Nebulization Q4H PRN    hydrALAZINE (APRESOLINE) tablet 100 mg  100 mg Oral TID    carvedilol (COREG) tablet 6.25 mg  6.25 mg Oral BID WITH MEALS    0.9% sodium chloride infusion 250 mL  250 mL IntraVENous PRN    insulin lispro (HUMALOG) injection   SubCUTAneous AC&HS    sodium chloride (NS) flush 5-40 mL  5-40 mL IntraVENous Q8H    sodium chloride (NS) flush 5-40 mL  5-40 mL IntraVENous PRN    glucose chewable tablet 16 g  4 Tab Oral PRN    glucagon (GLUCAGEN) injection 1 mg  1 mg IntraMUSCular PRN    dextrose 10% infusion 0-250 mL  0-250 mL IntraVENous PRN    alteplase (CATHFLO) 1 mg in sterile water (preservative free) 1 mL injection  1 mg InterCATHeter PRN    heparin (porcine) pf 500 Units  500 Units InterCATHeter PRN    carbidopa-levodopa (SINEMET)  mg per tablet 2 Tab  2 Tab Oral QID    lactobac ac& pc-s.therm-b.anim (EH Q/RISAQUAD)  1 Cap Oral DAILY    losartan (COZAAR) tablet 50 mg  50 mg Oral DAILY    hydrALAZINE (APRESOLINE) 20 mg/mL injection 10 mg  10 mg IntraVENous Q6H PRN     ______________________________________________________________________  EXPECTED LENGTH OF STAY: 3d 7h  ACTUAL LENGTH OF STAY:          6                 Alexandro Martin MD

## 2020-01-07 NOTE — PROGRESS NOTES
Bedside shift change report given to Anitha RN (oncoming nurse) by Roni RN (offgoing nurse). Report included the following information SBAR, Kardex, Intake/Output and Recent Results.

## 2020-01-07 NOTE — PROGRESS NOTES
Problem: Pressure Injury - Risk of  Goal: *Prevention of pressure injury  Description  Document Chester Scale and appropriate interventions in the flowsheet. Outcome: Progressing Towards Goal  Note: Pressure Injury Interventions:  Sensory Interventions: Assess changes in LOC, Check visual cues for pain, Keep linens dry and wrinkle-free, Maintain/enhance activity level, Minimize linen layers    Moisture Interventions: Apply protective barrier, creams and emollients, Internal/External urinary devices, Minimize layers    Activity Interventions: PT/OT evaluation, Pressure redistribution bed/mattress(bed type)    Mobility Interventions: Assess need for specialty bed, HOB 30 degrees or less    Nutrition Interventions: Document food/fluid/supplement intake, Offer support with meals,snacks and hydration    Friction and Shear Interventions: HOB 30 degrees or less, Lift sheet                Problem: Airway Clearance - Ineffective  Goal: *Patent airway  Outcome: Progressing Towards Goal  Goal: *Absence of airway secretions  Outcome: Progressing Towards Goal     Problem: Breathing Pattern - Ineffective  Goal: *Absence of hypoxia  Outcome: Progressing Towards Goal     Problem: Falls - Risk of  Goal: *Absence of Falls  Description  Document Jane Diaz Fall Risk and appropriate interventions in the flowsheet.   Outcome: Progressing Towards Goal  Note: Fall Risk Interventions:  Mobility Interventions: Patient to call before getting OOB    Mentation Interventions: Bed/chair exit alarm, Family/sitter at bedside, More frequent rounding, Reorient patient, Room close to nurse's station    Medication Interventions: Evaluate medications/consider consulting pharmacy    Elimination Interventions: Call light in reach, Toilet paper/wipes in reach, Toileting schedule/hourly rounds    History of Falls Interventions: Door open when patient unattended, Room close to nurse's station

## 2020-01-07 NOTE — PROGRESS NOTES
KELLEY:    -CM following for transitions of care, discussed during rounds. -CM informed that patient should be ready for discharge tomorrow (Wednesday) and will return to [address] [place] [address].   -Referral sent to them via [employer] to ensure they could accept back.     Jd GARCIA, ACM

## 2020-01-07 NOTE — PROGRESS NOTES
Nephrology Progress Note  Dub Abed  Date of Admission : 12/31/2019    CC:  Follow up for   on CKD, anemia       Assessment and Plan      on CKD:  -Creatinine stable  -Okay for discharge from renal standpoint  -DC home on Lasix 40 mg MWF    CKD III:  - baseline Cr around 1.6 to 1.7  - from DM, HTN and reduced renal mass     Hx of RCC s/p partial L nephrectomy     HTN:  -Continue current medications     Chronic Anemia:  -Changed Epogen to 20,000 units q. 7 days  -Iron deficiency: Ordered Venofer     Pneumonia:  - on abx per hospitalist     DM2:  - on insulin       Interval History:  Seen and examined. No new symptoms  Epogen was resumed yesterday  Hemoglobin down to 8.5  Current Medications: all current  Medications have been eviewed in EPIC  Review of Systems: Pertinent items are noted in HPI. Objective:  Vitals:    Vitals:    01/06/20 2322 01/07/20 0710 01/07/20 0751 01/07/20 1257   BP: 147/69 172/71 170/68 158/71   Pulse: (!) 53 60 (!) 56 (!) 55   Resp: 18  16 16   Temp: 97.4 °F (36.3 °C)  97.8 °F (36.6 °C) 97.8 °F (36.6 °C)   SpO2: 96%  96% 96%   Weight:       Height:         Intake and Output:  No intake/output data recorded. 01/05 1901 - 01/07 0700  In: -   Out: 2360 [Urine:1550]    Physical Examination:    General: NAD,Conversant   Neck:  Supple, no mass  Resp:  CTA  CV:  RRR,  no murmur or rub, trace b/l LE edema  GI:  Soft, NT, + Bowel sounds, no hepatosplenomegaly  Neurologic:  Non focal  Psych:             AAO x 3 appropriate affect   Skin:  No Rash  :  No branch    []    High complexity decision making was performed  []    Patient is at high-risk of decompensation with multiple organ involvement    Lab Data Personally Reviewed: I have reviewed all the pertinent labs, microbiology data and radiology studies during assessment.     Recent Labs     01/07/20  0201 01/06/20  0457    143   K 5.2* 5.2*   * 114*   CO2 26 25   GLU 67 76   BUN 50* 56*   CREA 1.99* 2.19*   CA 8.4* 8.6 PHOS  --  3.6   ALB  --  2.1*     Recent Labs     01/07/20  0201 01/06/20  0457   WBC 6.3 6.9   HGB 8.5* 9.0*   HCT 27.8* 28.9*    221     Lab Results   Component Value Date/Time    Specimen Description: CLEAN CATCH 02/19/2014 11:12 AM    Specimen Description: STOOL 03/19/2009 10:03 AM     Lab Results   Component Value Date/Time    Culture result: KLEBSIELLA PNEUMONIAE (A) 12/31/2019 11:43 PM    Culture result: NO GROWTH 5 DAYS 12/31/2019 11:12 PM    Culture result: MRSA NOT PRESENT 11/07/2018 07:38 AM    Culture result:  11/07/2018 07:38 AM         Screening of patient nares for MRSA is for surveillance purposes and, if positive, to facilitate isolation considerations in high risk settings. It is not intended for automatic decolonization interventions per se as regimens are not sufficiently effective to warrant routine use.      Recent Results (from the past 24 hour(s))   GLUCOSE, POC    Collection Time: 01/06/20  4:19 PM   Result Value Ref Range    Glucose (POC) 79 65 - 100 mg/dL    Performed by Yaritza Franco    GLUCOSE, POC    Collection Time: 01/06/20  9:10 PM   Result Value Ref Range    Glucose (POC) 76 65 - 100 mg/dL    Performed by Marlo Mccartney    GLUCOSE, POC    Collection Time: 01/06/20  9:32 PM   Result Value Ref Range    Glucose (POC) 88 65 - 100 mg/dL    Performed by Angela Merino    CBC WITH AUTOMATED DIFF    Collection Time: 01/07/20  2:01 AM   Result Value Ref Range    WBC 6.3 3.6 - 11.0 K/uL    RBC 2.76 (L) 3.80 - 5.20 M/uL    HGB 8.5 (L) 11.5 - 16.0 g/dL    HCT 27.8 (L) 35.0 - 47.0 %    .7 (H) 80.0 - 99.0 FL    MCH 30.8 26.0 - 34.0 PG    MCHC 30.6 30.0 - 36.5 g/dL    RDW 15.0 (H) 11.5 - 14.5 %    PLATELET 847 145 - 652 K/uL    MPV 10.0 8.9 - 12.9 FL    NRBC 0.0 0  WBC    ABSOLUTE NRBC 0.00 0.00 - 0.01 K/uL    NEUTROPHILS 79 (H) 32 - 75 %    LYMPHOCYTES 12 12 - 49 %    MONOCYTES 7 5 - 13 %    EOSINOPHILS 1 0 - 7 %    BASOPHILS 0 0 - 1 %    IMMATURE GRANULOCYTES 1 (H) 0.0 - 0.5 %    ABS. NEUTROPHILS 4.9 1.8 - 8.0 K/UL    ABS. LYMPHOCYTES 0.8 0.8 - 3.5 K/UL    ABS. MONOCYTES 0.4 0.0 - 1.0 K/UL    ABS. EOSINOPHILS 0.1 0.0 - 0.4 K/UL    ABS. BASOPHILS 0.0 0.0 - 0.1 K/UL    ABS. IMM. GRANS. 0.1 (H) 0.00 - 0.04 K/UL    DF SMEAR SCANNED      RBC COMMENTS MACROCYTOSIS  1+        RBC COMMENTS ANISOCYTOSIS  1+       METABOLIC PANEL, BASIC    Collection Time: 01/07/20  2:01 AM   Result Value Ref Range    Sodium 145 136 - 145 mmol/L    Potassium 5.2 (H) 3.5 - 5.1 mmol/L    Chloride 115 (H) 97 - 108 mmol/L    CO2 26 21 - 32 mmol/L    Anion gap 4 (L) 5 - 15 mmol/L    Glucose 67 65 - 100 mg/dL    BUN 50 (H) 6 - 20 MG/DL    Creatinine 1.99 (H) 0.55 - 1.02 MG/DL    BUN/Creatinine ratio 25 (H) 12 - 20      GFR est AA 28 (L) >60 ml/min/1.73m2    GFR est non-AA 23 (L) >60 ml/min/1.73m2    Calcium 8.4 (L) 8.5 - 10.1 MG/DL   GLUCOSE, POC    Collection Time: 01/07/20  6:59 AM   Result Value Ref Range    Glucose (POC) 70 65 - 100 mg/dL    Performed by [address] [place] [address], POC    Collection Time: 01/07/20  7:22 AM   Result Value Ref Range    Glucose (POC) 85 65 - 100 mg/dL    Performed by [address] [place] [address] Pat, POC    Collection Time: 01/07/20 11:11 AM   Result Value Ref Range    Glucose (POC) 80 65 - 100 mg/dL    Performed by [address], POC    Collection Time: 01/07/20 11:36 AM   Result Value Ref Range    Glucose (POC) 102 (H) 65 - 100 mg/dL    Performed by Anila Hernandez MD  [facility]   [address] [address]  Phone - [phone]   Fax - [phone]  www. [email or web address]

## 2020-01-07 NOTE — PROGRESS NOTES
[facility]itional [facility] Team: Initial HUG Note    Date of Assessment: 01/07/20  Time of Assessment:  3:52 PM    Assessment & Plan   KELLEY Diagnoses:  Pneumonia  -last dose of Levaquin 1/7/19  -short course of prednisone 30 mg daily--completed 1/5/20    Hypothermia, resolved  -suspect she has preceding viral infection precipitating the above  -per hospitalist's note, rapid influenza negative  -PBCs 12/31/19 negative     Acute on chronic CKD Stage 3  -likely prerenal.   -per hospitalist's note, received 1L IV fluids in ER.  -resumed losartan 50 mg daily, as she has been taking this PTA, per home med list in [address] [facility]  -Creatinine bump and lasix held  -now on Lasix 40 mg three times/week--M-W-F    Results for Savannah Del Valle (MRN [id number]) as of 1/7/2020 16:05   Ref. Range 1/3/2020 03:52 1/4/2020 01:26 1/6/2020 04:57 1/7/2020 02:01   Creatinine Latest Ref Range: 0.55 - 1.02 MG/DL 2.07 (H) 1.92 (H) 2.19 (H) 1.99 (H)     Mild hyperkalemia    Results for Savannah Del Valle (MRN [id number]) as of 1/7/2020 16:05   Ref. Range 1/3/2020 03:52 1/4/2020 01:26 1/6/2020 04:57 1/7/2020 02:01   Potassium Latest Ref Range: 3.5 - 5.1 mmol/L 5.0 4.8 5.2 (H) 5.2 (H)     Uncontrolled hypertension  -resumed home regimen. Adjust as needed  -losartan resumed and amlodipine 10 mg added on 1/5      Anemia    -hemoglobin down to 6.4   -patient gets monthly Procrit  -transfused 1 unit of blood on 1/3/20. Hemoglobin improved. Results for Savannah Del Valle (MRN [id number]) as of 1/7/2020 16:05   Ref. Range 1/3/2020 03:52 1/3/2020 15:44 1/4/2020 01:26 1/6/2020 04:57 1/7/2020 02:01   HGB Latest Ref Range: 11.5 - 16.0 g/dL 6.4 (L) 8.4 (L) 8.3 (L) 9.0 (L) 8.5 (L)   HCT Latest Ref Range: 35.0 - 47.0 % 21.7 (L) 27.6 (L) 26.4 (L) 28.9 (L) 27.8 (L)     Mild hypernatremia  -likely due to dehydration; corrected     Results for Savannah Ivon (MRN [id number]) as of 1/7/2020 16:05   Ref.  Range 12/31/2019 23:12 1/1/2020 03:11 1/2/2020 01:44 1/3/2020 03:52 1/4/2020 01:26 1/6/2020 04:57 1/7/2020 02:01   Sodium Latest Ref Range: 136 - 145 mmol/L 147 (H) 146 (H) 147 (H) 142 143 143 145       Readmission Risks:   high    1. Frail advanced-age elder with multiple co-morbidities  2. Hx dementia  3. Full code status        Nurse Navigator: no [facility] Transitions Nurse assigned yet  KELLEY appointments: TBD    Code status: Full  Recommended Disposition: SNF/LTC--[facility] [address] [facility] this [facility]:  only current    Heart Failure Bundle:  no    In to see patient who appeared to be asleep in bed but opened her eyes when I approached. Speaks but most of what she says is difficult to understand. No family at bedside at this time. Advance Care Plan: not on file; [facility] or DDNR are on file there and was told they are not. Patient is a full code there as well. No family present in the room this afternoon and since [facility] NP has not met daughter, seems inappropriate to call to have this conversation with her. Unable to send CC message to PCP on file--Dr. Greg Darnell is not in [address] [place] [address] directory. Medication reconciliation to be performed at discharge by [facility] NP. Discharge Needs: will ask facility MD if he will have a code status conversation with the daughter once Mrs. Anna is back at the [id number] [place] [address]. Awareness of medical conditions: has dementia and speaks but unable to understand her. Patient's willingness to go to SNF or inpt rehab if necessary: live in [address] [place] [address] at the [address] [place] [address]. [facility] NP will leave patient with [facility] calender/follow up appointments/Ambulatory Nurse Navigation information if appropriate at discharge. [facility] information not given to patient who has dementia and lives in [address] [place] [address]. Alexa Newton is a 80 y.o. female inpatient at [facility] admitted with shortness of breath and hypoxia on 1/1/20.   Per ED MD notes, she was reportedly dx'd with PNA the day prior at the nursing facility and started on doxycycline. Chart reviewed by Adam Orlando NP, with the [facility] Understanding of Goals) program.  The Transitional Care Team bridges the gaps in care and education surrounding discharge from the acute care facility. The objective is to empower the patient and family in taking a proactive role in the task of preventing readmission within the first thirty days after discharge from the acute care setting. The team is also involved in the efforts to reduce readmission to the acute care setting after stabilization and discharge from the acute care environment either to the skilled nursing facilities or community.      Past Medical History:   Diagnosis Date    Anemia     Arthritis     Chronic kidney disease     RENAL MASS    Dementia with Lewy bodies ([address] 03/01/2017    Environmental allergies     HTN (hypertension) 1/19/2012    Hyperlipemia 1/19/2012    Hypertension     Other ill-defined conditions(799.89)     parkinson disease    Parkinson disease ([address] 1/19/2012    S/P radioactive iodine thyroid ablation 1/19/2012    S/P NINI-BSO 1/19/2012    S/P TKR (total knee replacement) 1/19/2012    Thyroid disease     goiter/hyperthyroid (s/p iodine tx 1999)    Unspecified adverse effect of anesthesia     CLAUSTROPHOBIA       Advance Care Planning 1/1/2020   Patient's [place] is: -   Primary Decision Maker Name -   Primary Decision Maker Phone Number -   Primary Decision Maker Relationship to Patient -   Confirm Advance Directive Yes, on file

## 2020-01-08 NOTE — ROUTINE PROCESS
68 59    Spoke with Dr. Foreign Mena regarding patients low BG. BG was 76. Then when reassed after drinking apple juice and a small amount of food her BG was 64. Per Dr. Foreign Mena give patient the IV dextrose and make sure it is at least 100 before discharge.

## 2020-01-08 NOTE — PROGRESS NOTES
Nephrology Progress Note  Annette Knight  Date of Admission : 12/31/2019    CC:  Follow up for   on CKD, anemia       Assessment and Plan      on CKD:  -Labs pending   -continue Lasix 40 mg MWF for Pleural effusion     Hyperkalemia :  - 2/2 diet, ARB and ARF   - continue Veltassa    CKD III:  - baseline Cr around 1.6 to 1.7  - from DM, HTN and reduced renal mass     Hx of RCC s/p partial L nephrectomy     HTN:  -Continue current medications     Chronic Anemia:  -Changed Epogen to 20,000 units q. 7 days  -Iron deficiency: Ordered Venofer     LLL Pneumonia w/ effusion:  - on abx per hospitalist     DM2:  - on insulin       Interval History:  Seen and examined. She is little more tachypenic today       Current Medications: all current  Medications have been eviewed in EPIC  Review of Systems: Review of systems not obtained due to patient factors. Objective:  Vitals:    Vitals:    01/07/20 2224 01/07/20 2323 01/08/20 0656 01/08/20 0746   BP: 158/66 127/58 168/72 148/67   Pulse: 62 (!) 58 63 65   Resp:  16  16   Temp:  97.4 °F (36.3 °C)  97.5 °F (36.4 °C)   SpO2:  95%  98%   Weight:       Height:         Intake and Output:  No intake/output data recorded. 01/06 1901 - 01/08 0700  In: 100 [P.O.:100]  Out: 750 [Urine:750]    Physical Examination:    General: NAD,Conversant   Neck:  Supple, no mass  Resp:  Diminished Left base   CV:  RRR,  no murmur or rub, no  b/l LE edema  GI:  Soft, NT, + Bowel sounds, no hepatosplenomegaly  Neurologic:  Non focal    []    High complexity decision making was performed  []    Patient is at high-risk of decompensation with multiple organ involvement    Lab Data Personally Reviewed: I have reviewed all the pertinent labs, microbiology data and radiology studies during assessment.     Recent Labs     01/07/20  0201 01/06/20  0457    143   K 5.2* 5.2*   * 114*   CO2 26 25   GLU 67 76   BUN 50* 56*   CREA 1.99* 2.19*   CA 8.4* 8.6   PHOS  --  3.6   ALB  --  2.1*     Recent Labs     01/07/20  0201 01/06/20  0457   WBC 6.3 6.9   HGB 8.5* 9.0*   HCT 27.8* 28.9*    221     Lab Results   Component Value Date/Time    Specimen Description: CLEAN CATCH 02/19/2014 11:12 AM    Specimen Description: STOOL 03/19/2009 10:03 AM     Lab Results   Component Value Date/Time    Culture result: KLEBSIELLA PNEUMONIAE (A) 12/31/2019 11:43 PM    Culture result: NO GROWTH 5 DAYS 12/31/2019 11:12 PM    Culture result: MRSA NOT PRESENT 11/07/2018 07:38 AM    Culture result:  11/07/2018 07:38 AM         Screening of patient nares for MRSA is for surveillance purposes and, if positive, to facilitate isolation considerations in high risk settings. It is not intended for automatic decolonization interventions per se as regimens are not sufficiently effective to warrant routine use.      Recent Results (from the past 24 hour(s))   GLUCOSE, POC    Collection Time: 01/07/20 11:11 AM   Result Value Ref Range    Glucose (POC) 80 65 - 100 mg/dL    Performed by [address], POC    Collection Time: 01/07/20 11:36 AM   Result Value Ref Range    Glucose (POC) 102 (H) 65 - 100 mg/dL    Performed by [address] [place], [place] [address], POC    Collection Time: 01/07/20  4:08 PM   Result Value Ref Range    Glucose (POC) 75 65 - 100 mg/dL    Performed by [address], POC    Collection Time: 01/07/20  4:31 PM   Result Value Ref Range    Glucose (POC) 77 65 - 100 mg/dL    Performed by [address] [place], [place] [address], POC    Collection Time: 01/07/20  4:49 PM   Result Value Ref Range    Glucose (POC) 89 65 - 100 mg/dL    Performed by [address] [place], [place] [address], POC    Collection Time: 01/07/20  9:15 PM   Result Value Ref Range    Glucose (POC) 65 65 - 100 mg/dL    Performed by Tori Bernard    GLUCOSE, POC    Collection Time: 01/07/20  9:49 PM   Result Value Ref Range    Glucose (POC) 84 65 - 100 mg/dL    Performed by Tori Bernard    GLUCOSE, POC    Collection Time: 01/08/20  6:33 AM   Result Value Ref Range Glucose (POC) 58 (L) 65 - 100 mg/dL    Performed by Lashaun Hunt    GLUCOSE, POC    Collection Time: 01/08/20  7:27 AM   Result Value Ref Range    Glucose (POC) 125 (H) 65 - 100 mg/dL    Performed by Michael Jean MD  [address] [place] [address]  Phone - [phone]   Fax - [phone]  www. [facility]

## 2020-01-08 NOTE — PROGRESS NOTES
Bedside shift change report given to [address] [place] [address] (oncoming nurse) by Catalino Velez RN (offgoing nurse). Report included the following information SBAR, Kardex, Intake/Output, MAR and Recent Results.

## 2020-01-08 NOTE — PROGRESS NOTES
KELLEY:    Cm informed patient would be ready for discharge today. Cm contacted [address] [place] [address] and they are able to accept patient back today. Cm called patients' daughter Venus Storm, [phone]) who stated she did not think patient would be discharged today because one of the nurses informed her she was not eating well. Cm relayed to daughter that this had not been a concern that the doctor had expressed to cm, but that I would talk with nursing and call her back. Cm discussed with nursing, called daughter back.  All are in agreement with discharge today at 6 pm.     [address], ACM

## 2020-01-08 NOTE — DISCHARGE INSTRUCTIONS
Discharge Instructions       PATIENT ID: Pastor Bueno  MRN: [id number]   YOB: 1928    DATE OF ADMISSION: 12/31/2019 10:04 PM    DATE OF DISCHARGE: 1/8/2020    PRIMARY CARE PROVIDER: Gerardo Simon MD     ATTENDING PHYSICIAN: Heather Starkey MD  DISCHARGING PROVIDER: Glendell Ganser, MD    To contact this individual call [phone] and ask the  to page. If unavailable ask to be transferred the Adult Hospitalist Department. DISCHARGE DIAGNOSES pneumonia    CONSULTATIONS: IP CONSULT TO NEPHROLOGY    PROCEDURES/SURGERIES: * No surgery found *      FOLLOW UP APPOINTMENTS:   Follow-up Information     Follow up With Specialties Details Why Contact Info    Gerardo Simon MD Internal Medicine In 1 week  [address]  [phone]      Светлана Falcon MD Nephrology In 1 week  On license of [facility]  [phone]             ADDITIONAL CARE RECOMMENDATIONS:   Follow up with nephrology and PCP    DIET: Renal Diet    ACTIVITY: Activity as tolerated        DISCHARGE MEDICATIONS:   See Medication Reconciliation Form    · It is important that you take the medication exactly as they are prescribed. · Keep your medication in the bottles provided by the pharmacist and keep a list of the medication names, dosages, and times to be taken in your wallet. · Do not take other medications without consulting your doctor. NOTIFY YOUR PHYSICIAN FOR ANY OF THE FOLLOWING:   Fever over 101 degrees for 24 hours. Chest pain, shortness of breath, fever, chills, nausea, vomiting, diarrhea, change in mentation, falling, weakness, bleeding. Severe pain or pain not relieved by medications. Or, any other signs or symptoms that you may have questions about.       DISPOSITION:    Home With:   OT  PT  HH  RN       SNF/Inpatient Rehab/LTAC    Independent/assisted living    Hospice    Other:     CDMP Checked:   Yes ***     PROBLEM LIST Updated:  Yes *** Information obtained by :   I understand that if any problems occur once I am at home I am to contact my physician. I understand and acknowledge receipt of the instructions indicated above.                                                                                                                                              Physician's or R.N.'s Signature                                                                  Date/Time                                                                                                                                              Patient or Representative Signature                                                          Date/Time          Signed:   Max Gallegos MD  1/8/2020  3:36 PM

## 2020-01-08 NOTE — PROGRESS NOTES
Bedside shift change report given to [address] (oncoming nurse) by Ed Power RN (offgoing nurse). Report included the following information SBAR, Kardex, Intake/Output, MAR and Recent Results.

## 2020-01-08 NOTE — DISCHARGE SUMMARY
Inpatient hospitalist discharge summary                Brief Overview    PATIENT ID: Loreto Centeno    MRN: [id number]     YOB: 1928    Admitting Provider: Phoebe Rios MD    Discharging Provider: Noemy Lockwood MD   To contact this individual call [phone] and ask the  to page. If unavailable ask to be transferred the Adult Hospitalist Department. PCP at discharge: Ross Encarnacion, [facility] / [facility] [address]    Admission date: 12/31/2019  Date of Discharge: 01/08/20    Chief complaint:   Chief Complaint   Patient presents with    Shortness of Breath     Patient Active Problem List   Diagnosis Code    Parkinson disease ([facility] [address] [facility] G20    HTN (hypertension) I10    Hyperlipemia E78.5    S/P TKR (total knee replacement) Z96.659    S/P radioactive iodine thyroid ablation Z92.3    S/P NINI-BSO Z90.710, Z90.722, Z90.79    Environmental allergies Z91.09    Anemia D64.9    History of screening mammography Z92.89    Renal cell carcinoma ([address] C64.9    Pre-syncope R55    Parkinson's disease dementia ([facility] [address] G20, F02.80    Generalized weakness R53.1    Laceration of head S01. 91XA    Fall W19. XXXA    Hypothermia T68. Sandhya Ger Pneumonia J18.9         Discharge diagnosis, hospital course/plan:  As per initial admission summary   This is a 19-year-old female with past medical history significant for CKD, solitary kidney, hypertension, Parkinson's disease, dementia was brought to the emergency room with increased shortness of breath and hypoxia. She is now admitted for acute hypoxic respiratory failure. #Pneumonia. #Hypothermia, resolved  -Suspect she has preceding viral infection precipitating the above. Rapid influenza negative  On Levaquin, last dose on 1/8. Patient afebrile      #Acute on chronic CKD stage 3- likely prerenal. Received 1L IV fluids in ER.   Anabell Perez to discharge as per nephrology   Will be discharged on lasix as per nephrology recommendations   Lasix 40 mg MWF      #Mild hyperkalemia. Follow up with PCP as an outpatient  Repeat BMP check      #Uncontrolled hypertension, resolved   -Resumed home regimen. Adjust as needed  Losartan resumed. Added amlodipine     #Parkinson's disease. Baseline dementia. Continue Sinemet     # Anemia. stable   -IV venofer today       # Mild Hypernatremia- likely due to dehydration. corrected         #History of renal cell carcinoma status post left partial nephrectomy  Stable     On the date of discharge, diagnostic face to face encounter was performed. Patient was hemodynamically stable, offering no new complaints. Denies any shortness of breath at rest, no fevers or chills, no diarrhea or constipation. Patient is agreeable for discharge. Patient understood and verbalized the understanding of the discharge plan. Patient was advised to seek medical help/ care or return to ED, if symptoms recur, worsen or new symptoms develop. Discharge Disposition:  Home or Self Care    Discharge activity:  Activity as tolerated    Code status at discharge:  Full Code     Outpatient follow up:  Please follow up with your PCP in one week  In addition follow up with Nephrology       Future appointments-  No future appointments.   Follow-up Information     Follow up With Specialties Details Why Contact Cely Vallecillo MD Internal Medicine In 1 week  [address]  [phone]      Alicia Gastelum MD Nephrology In 1 week  [facility]  [phone]            Operative procedures performed:      Consults: IP CONSULT TO NEPHROLOGY    Procedures:   * No surgery found *    Diet:  DIET ONE TIME MESSAGE  DIET NUTRITIONAL SUPPLEMENTS  DIET ONE TIME MESSAGE  DIET REGULAR    Pertinent test results:  Xr Chest Port    Result Date: 1/7/2020  INDICATION: Recent pneumonia COMPARISON: December 31, 2019 FINDINGS: AP portable imaging of the chest performed at 7:22 PM demonstrates unchanged cardiomegaly. Right internal jugular Port-A-Cath is stable in position. Dense consolidation of the left lung base and associated left pleural effusion are not significantly changed. There is mild right basilar atelectasis. Degenerative changes are present in the shoulders and thoracic spine. IMPRESSION: Unchanged dense consolidation in the left lower lobe, with associated left pleural effusion. Mild right basilar atelectasis. Xr Chest Port    Result Date: 12/31/2019  EXAM:  XR CHEST PORT INDICATION:  cough, hypoxia COMPARISON:  11/6/2018 FINDINGS: A portable AP radiograph of the chest was obtained at 2201 hours. Port-A-Cath tip is unchanged overlies SVC. Kishor Magaña There is increasing opacity left lung base. Right lung is grossly clear except for small parenchymal density right base. Cardiomegaly is stable. There are severe degenerative changes in the shoulders left greater than right. IMPRESSION: 1. There is opacification left retrocardiac region consistent with effusion/atelectasis/airspace disease. There is minor atelectasis/airspace disease right base. Kishor Magaña       Recent Results (from the past 168 hour(s))   GLUCOSE, POC    Collection Time: 01/01/20 10:04 PM   Result Value Ref Range    Glucose (POC) 91 65 - 100 mg/dL    Performed by Elissa Montemayor    CBC WITH AUTOMATED DIFF    Collection Time: 01/02/20  1:44 AM   Result Value Ref Range    WBC 5.3 3.6 - 11.0 K/uL    RBC 2.19 (L) 3.80 - 5.20 M/uL    HGB 6.9 (L) 11.5 - 16.0 g/dL    HCT 23.0 (L) 35.0 - 47.0 %    .0 (H) 80.0 - 99.0 FL    MCH 31.5 26.0 - 34.0 PG    MCHC 30.0 30.0 - 36.5 g/dL    RDW 14.1 11.5 - 14.5 %    PLATELET 848 779 - 785 K/uL    MPV 10.3 8.9 - 12.9 FL    NRBC 0.0 0  WBC    ABSOLUTE NRBC 0.00 0.00 - 0.01 K/uL    NEUTROPHILS 87 (H) 32 - 75 %    LYMPHOCYTES 11 (L) 12 - 49 %    MONOCYTES 1 (L) 5 - 13 %    EOSINOPHILS 0 0 - 7 %    BASOPHILS 0 0 - 1 %    IMMATURE GRANULOCYTES 1 (H) 0.0 - 0.5 % ABS. NEUTROPHILS 4.5 1.8 - 8.0 K/UL    ABS. LYMPHOCYTES 0.6 (L) 0.8 - 3.5 K/UL    ABS. MONOCYTES 0.1 0.0 - 1.0 K/UL    ABS. EOSINOPHILS 0.0 0.0 - 0.4 K/UL    ABS. BASOPHILS 0.0 0.0 - 0.1 K/UL    ABS. IMM.  GRANS. 0.1 (H) 0.00 - 0.04 K/UL    DF SMEAR SCANNED      RBC COMMENTS MACROCYTOSIS  1+        RBC COMMENTS ANISOCYTOSIS  1+        RBC COMMENTS OVALOCYTES  PRESENT       METABOLIC PANEL, BASIC    Collection Time: 01/02/20  1:44 AM   Result Value Ref Range    Sodium 147 (H) 136 - 145 mmol/L    Potassium 5.2 (H) 3.5 - 5.1 mmol/L    Chloride 121 (H) 97 - 108 mmol/L    CO2 21 21 - 32 mmol/L    Anion gap 5 5 - 15 mmol/L    Glucose 100 65 - 100 mg/dL    BUN 53 (H) 6 - 20 MG/DL    Creatinine 2.07 (H) 0.55 - 1.02 MG/DL    BUN/Creatinine ratio 26 (H) 12 - 20      GFR est AA 27 (L) >60 ml/min/1.73m2    GFR est non-AA 22 (L) >60 ml/min/1.73m2    Calcium 8.4 (L) 8.5 - 10.1 MG/DL   GLUCOSE, POC    Collection Time: 01/02/20  6:25 AM   Result Value Ref Range    Glucose (POC) 84 65 - 100 mg/dL    Performed by Francis Pack    IRON PROFILE    Collection Time: 01/02/20 10:43 AM   Result Value Ref Range    Iron 31 (L) 35 - 150 ug/dL    TIBC 116 (L) 250 - 450 ug/dL    Iron % saturation 27 20 - 50 %   FERRITIN    Collection Time: 01/02/20 10:43 AM   Result Value Ref Range    Ferritin 489 (H) 8 - 252 NG/ML   ECHO ADULT COMPLETE    Collection Time: 01/02/20 11:25 AM   Result Value Ref Range    LA Volume 91.55 22 - 52 mL    LV E' Lateral Velocity 5.91 cm/s    LV E' Septal Velocity 3.71 cm/s    Tapse 2.36 (A) 1.5 - 2.0 cm    Ao Root D 2.98 cm    Aortic Valve Systolic Peak Velocity 899.48 cm/s    Aortic Valve Area by Continuity of Peak Velocity 2.5 cm2    AoV PG 4.3 mmHg    LVIDd 5.44 (A) 3.9 - 5.3 cm    LVPWd 1.20 (A) 0.6 - 0.9 cm    LVIDs 4.46 cm    IVSd 1.09 (A) 0.6 - 0.9 cm    LVOT d 1.98 cm    LVOT Peak Velocity 84.53 cm/s    LVOT Peak Gradient 2.9 mmHg    MVA (PHT) 2.0 cm2    MV A Luis Felipe 52.75 cm/s    MV E Luis Felipe 50.07 cm/s    MV E/A 0.95     Left Atrium to Aortic Root Ratio 1.50     RVIDd 4.39 cm    LA Vol 4C 72.84 (A) 22 - 52 mL    LA Vol 2C 90.64 (A) 22 - 52 mL    LA Area 4C 24.2 cm2    LV Mass .2 (A) 67 - 162 g    LV Mass AL Index 176.8 43 - 95 g/m2    E/E' lateral 8.47     E/E' septal 13.50     E/E' ratio (averaged) 10.98     Mitral Valve E Wave Deceleration Time 372.8 ms    Mitral Valve Pressure Half-time 108.1 ms    Left Atrium Major Axis 4.47 cm    Triscuspid Valve Regurgitation Peak Gradient 65.6 mmHg    Pulmonic Valve Max Velocity 89.29 cm/s    TR Max Velocity 404.91 cm/s    LA Vol Index 54.27 16 - 28 ml/m2    LA Vol Index 53.73 16 - 28 ml/m2    LA Vol Index 43.18 16 - 28 ml/m2    Left Ventricular Fractional Shortening by 2D [id number] %    PV End Diastolic Velocity 1.3 mmHg    Mitral Valve Deceleration [place] [id number]     AV Velocity Ratio 0.82     PV peak gradient 3.2 mmHg    PASP 65.0 mmHg   GLUCOSE, POC    Collection Time: 01/02/20 11:29 AM   Result Value Ref Range    Glucose (POC) 109 (H) 65 - 100 mg/dL    Performed by [employer] P    GLUCOSE, POC    Collection Time: 01/02/20  4:29 PM   Result Value Ref Range    Glucose (POC) 149 (H) 65 - 100 mg/dL    Performed by [employer] P    GLUCOSE, POC    Collection Time: 01/02/20  9:06 PM   Result Value Ref Range    Glucose (POC) 157 (H) 65 - 100 mg/dL    Performed by Toñoie Delude    OCCULT BLOOD, STOOL    Collection Time: 01/03/20 12:45 AM   Result Value Ref Range    Occult blood, stool NEGATIVE  NEG     CBC WITH AUTOMATED DIFF    Collection Time: 01/03/20  3:52 AM   Result Value Ref Range    WBC 3.0 (L) 3.6 - 11.0 K/uL    RBC 2.05 (L) 3.80 - 5.20 M/uL    HGB 6.4 (L) 11.5 - 16.0 g/dL    HCT 21.7 (L) 35.0 - 47.0 %    .9 (H) 80.0 - 99.0 FL    MCH 31.2 26.0 - 34.0 PG    MCHC 29.5 (L) 30.0 - 36.5 g/dL    RDW 14.0 11.5 - 14.5 %    PLATELET 123 611 - 282 K/uL    MPV 10.1 8.9 - 12.9 FL    NRBC 0.0 0  WBC    ABSOLUTE NRBC 0.00 0.00 - 0.01 K/uL NEUTROPHILS 60 32 - 75 %    LYMPHOCYTES 28 12 - 49 %    MONOCYTES 11 5 - 13 %    EOSINOPHILS 0 0 - 7 %    BASOPHILS 0 0 - 1 %    IMMATURE GRANULOCYTES 1 (H) 0.0 - 0.5 %    ABS. NEUTROPHILS 1.9 1.8 - 8.0 K/UL    ABS. LYMPHOCYTES 0.8 0.8 - 3.5 K/UL    ABS. MONOCYTES 0.3 0.0 - 1.0 K/UL    ABS. EOSINOPHILS 0.0 0.0 - 0.4 K/UL    ABS. BASOPHILS 0.0 0.0 - 0.1 K/UL    ABS. IMM.  GRANS. 0.0 0.00 - 0.04 K/UL    DF SMEAR SCANNED      RBC COMMENTS ANISOCYTOSIS  1+        RBC COMMENTS MACROCYTOSIS  1+       METABOLIC PANEL, BASIC    Collection Time: 01/03/20  3:52 AM   Result Value Ref Range    Sodium 142 136 - 145 mmol/L    Potassium 5.0 3.5 - 5.1 mmol/L    Chloride 115 (H) 97 - 108 mmol/L    CO2 22 21 - 32 mmol/L    Anion gap 5 5 - 15 mmol/L    Glucose 111 (H) 65 - 100 mg/dL    BUN 53 (H) 6 - 20 MG/DL    Creatinine 2.07 (H) 0.55 - 1.02 MG/DL    BUN/Creatinine ratio 26 (H) 12 - 20      GFR est AA 27 (L) >60 ml/min/1.73m2    GFR est non-AA 22 (L) >60 ml/min/1.73m2    Calcium 8.4 (L) 8.5 - 10.1 MG/DL   TYPE & SCREEN    Collection Time: 01/03/20  6:32 AM   Result Value Ref Range    Crossmatch Expiration 01/06/2020     ABO/Rh(D) [facility] POSITIVE     Antibody screen NEG     Unit number [id number]     Blood component type [facility]     Unit division 00     Status of unit TRANSFUSED     Crossmatch result Compatible    GLUCOSE, POC    Collection Time: 01/03/20  6:36 AM   Result Value Ref Range    Glucose (POC) 102 (H) 65 - 100 mg/dL    Performed by Mimeo    GLUCOSE, POC    Collection Time: 01/03/20 11:48 AM   Result Value Ref Range    Glucose (POC) 106 (H) 65 - 100 mg/dL    Performed by General Bolk    CBC W/O DIFF    Collection Time: 01/03/20  3:44 PM   Result Value Ref Range    WBC 5.0 3.6 - 11.0 K/uL    RBC 2.71 (L) 3.80 - 5.20 M/uL    HGB 8.4 (L) 11.5 - 16.0 g/dL    HCT 27.6 (L) 35.0 - 47.0 %    .8 (H) 80.0 - 99.0 FL    MCH 31.0 26.0 - 34.0 PG    MCHC 30.4 30.0 - 36.5 g/dL    RDW 16.5 (H) 11.5 - 14.5 %    PLATELET 185 150 - 400 K/uL    MPV 9.9 8.9 - 12.9 FL    NRBC 0.0 0  WBC    ABSOLUTE NRBC 0.00 0.00 - 0.01 K/uL   GLUCOSE, POC    Collection Time: 01/03/20  4:25 PM   Result Value Ref Range    Glucose (POC) 138 (H) 65 - 100 mg/dL    Performed by Brant TINOCO    GLUCOSE, POC    Collection Time: 01/03/20  9:10 PM   Result Value Ref Range    Glucose (POC) 120 (H) 65 - 100 mg/dL    Performed by Matthew BoudreauxLists of hospitals in the [place]    METABOLIC PANEL, BASIC    Collection Time: 01/04/20  1:26 AM   Result Value Ref Range    Sodium 143 136 - 145 mmol/L    Potassium 4.8 3.5 - 5.1 mmol/L    Chloride 118 (H) 97 - 108 mmol/L    CO2 22 21 - 32 mmol/L    Anion gap 3 (L) 5 - 15 mmol/L    Glucose 83 65 - 100 mg/dL    BUN 49 (H) 6 - 20 MG/DL    Creatinine 1.92 (H) 0.55 - 1.02 MG/DL    BUN/Creatinine ratio 26 (H) 12 - 20      GFR est AA 30 (L) >60 ml/min/1.73m2    GFR est non-AA 24 (L) >60 ml/min/1.73m2    Calcium 7.5 (L) 8.5 - 10.1 MG/DL   CBC WITH AUTOMATED DIFF    Collection Time: 01/04/20  1:26 AM   Result Value Ref Range    WBC 4.8 3.6 - 11.0 K/uL    RBC 2.65 (L) 3.80 - 5.20 M/uL    HGB 8.3 (L) 11.5 - 16.0 g/dL    HCT 26.4 (L) 35.0 - 47.0 %    MCV 99.6 (H) 80.0 - 99.0 FL    MCH 31.3 26.0 - 34.0 PG    MCHC 31.4 30.0 - 36.5 g/dL    RDW 16.4 (H) 11.5 - 14.5 %    PLATELET 910 140 - 131 K/uL    MPV 9.9 8.9 - 12.9 FL    NRBC 0.0 0  WBC    ABSOLUTE NRBC 0.00 0.00 - 0.01 K/uL    NEUTROPHILS 75 32 - 75 %    LYMPHOCYTES 17 12 - 49 %    MONOCYTES 7 5 - 13 %    EOSINOPHILS 0 0 - 7 %    BASOPHILS 0 0 - 1 %    IMMATURE GRANULOCYTES 1 (H) 0.0 - 0.5 %    ABS. NEUTROPHILS 3.5 1.8 - 8.0 K/UL    ABS. LYMPHOCYTES 0.8 0.8 - 3.5 K/UL    ABS. MONOCYTES 0.3 0.0 - 1.0 K/UL    ABS. EOSINOPHILS 0.0 0.0 - 0.4 K/UL    ABS. BASOPHILS 0.0 0.0 - 0.1 K/UL    ABS. IMM.  GRANS. 0.1 (H) 0.00 - 0.04 K/UL    DF AUTOMATED     GLUCOSE, POC    Collection Time: 01/04/20  6:33 AM   Result Value Ref Range    Glucose (POC) 85 65 - 100 mg/dL    Performed by Matthew Chaudhari GLUCOSE, POC    Collection Time: 01/04/20 11:40 AM   Result Value Ref Range    Glucose (POC) 90 65 - 100 mg/dL    Performed by Dojovita Spaphay P    GLUCOSE, POC    Collection Time: 01/04/20  4:21 PM   Result Value Ref Range    Glucose (POC) 115 (H) 65 - 100 mg/dL    Performed by Dodebragpmary Spaphay P    GLUCOSE, POC    Collection Time: 01/04/20 10:08 PM   Result Value Ref Range    Glucose (POC) 131 (H) 65 - 100 mg/dL    Performed by Noelle Giordano    GLUCOSE, POC    Collection Time: 01/05/20  6:39 AM   Result Value Ref Range    Glucose (POC) 93 65 - 100 mg/dL    Performed by Mert Feliz    GLUCOSE, POC    Collection Time: 01/05/20 10:43 AM   Result Value Ref Range    Glucose (POC) 92 65 - 100 mg/dL    Performed by Jessica Jackson    GLUCOSE, POC    Collection Time: 01/05/20 11:28 AM   Result Value Ref Range    Glucose (POC) 86 65 - 100 mg/dL    Performed by Maciej Keel    GLUCOSE, POC    Collection Time: 01/05/20  4:09 PM   Result Value Ref Range    Glucose (POC) 111 (H) 65 - 100 mg/dL    Performed by Maciej [facility]    GLUCOSE, POC    Collection Time: 01/05/20  9:14 PM   Result Value Ref Range    Glucose (POC) 112 (H) 65 - 100 mg/dL    Performed by Kenneth Fernandez    RENAL FUNCTION PANEL    Collection Time: 01/06/20  4:57 AM   Result Value Ref Range    Sodium 143 136 - 145 mmol/L    Potassium 5.2 (H) 3.5 - 5.1 mmol/L    Chloride 114 (H) 97 - 108 mmol/L    CO2 25 21 - 32 mmol/L    Anion gap 4 (L) 5 - 15 mmol/L    Glucose 76 65 - 100 mg/dL    BUN 56 (H) 6 - 20 MG/DL    Creatinine 2.19 (H) 0.55 - 1.02 MG/DL    BUN/Creatinine ratio 26 (H) 12 - 20      GFR est AA 25 (L) >60 ml/min/1.73m2    GFR est non-AA 21 (L) >60 ml/min/1.73m2    Calcium 8.6 8.5 - 10.1 MG/DL    Phosphorus 3.6 2.6 - 4.7 MG/DL    Albumin 2.1 (L) 3.5 - 5.0 g/dL   CBC W/O DIFF    Collection Time: 01/06/20  4:57 AM   Result Value Ref Range    WBC 6.9 3.6 - 11.0 K/uL    RBC 2.91 (L) 3.80 - 5.20 M/uL    HGB 9.0 (L) 11.5 - 16.0 g/dL    HCT 28.9 (L) 35.0 - 47.0 %    MCV 99.3 (H) 80.0 - 99.0 FL    MCH 30.9 26.0 - 34.0 PG    MCHC 31.1 30.0 - 36.5 g/dL    RDW 15.2 (H) 11.5 - 14.5 %    PLATELET 399 360 - 762 K/uL    MPV 10.5 8.9 - 12.9 FL    NRBC 0.0 0  WBC    ABSOLUTE NRBC 0.00 0.00 - 0.01 K/uL   GLUCOSE, POC    Collection Time: 01/06/20  6:07 AM   Result Value Ref Range    Glucose (POC) 72 65 - 100 mg/dL    Performed by Kane Cortes, POC    Collection Time: 01/06/20  6:28 AM   Result Value Ref Range    Glucose (POC) 83 65 - 100 mg/dL    Performed by Kane Cortes, POC    Collection Time: 01/06/20 11:09 AM   Result Value Ref Range    Glucose (POC) 80 65 - 100 mg/dL    Performed by Jami Jimenez    GLUCOSE, POC    Collection Time: 01/06/20  4:19 PM   Result Value Ref Range    Glucose (POC) 79 65 - 100 mg/dL    Performed by Jami Jimenez    GLUCOSE, POC    Collection Time: 01/06/20  9:10 PM   Result Value Ref Range    Glucose (POC) 76 65 - 100 mg/dL    Performed by Steve Stoddard    GLUCOSE, POC    Collection Time: 01/06/20  9:32 PM   Result Value Ref Range    Glucose (POC) 88 65 - 100 mg/dL    Performed by Tang Petty    CBC WITH AUTOMATED DIFF    Collection Time: 01/07/20  2:01 AM   Result Value Ref Range    WBC 6.3 3.6 - 11.0 K/uL    RBC 2.76 (L) 3.80 - 5.20 M/uL    HGB 8.5 (L) 11.5 - 16.0 g/dL    HCT 27.8 (L) 35.0 - 47.0 %    .7 (H) 80.0 - 99.0 FL    MCH 30.8 26.0 - 34.0 PG    MCHC 30.6 30.0 - 36.5 g/dL    RDW 15.0 (H) 11.5 - 14.5 %    PLATELET 958 265 - 898 K/uL    MPV 10.0 8.9 - 12.9 FL    NRBC 0.0 0  WBC    ABSOLUTE NRBC 0.00 0.00 - 0.01 K/uL    NEUTROPHILS 79 (H) 32 - 75 %    LYMPHOCYTES 12 12 - 49 %    MONOCYTES 7 5 - 13 %    EOSINOPHILS 1 0 - 7 %    BASOPHILS 0 0 - 1 %    IMMATURE GRANULOCYTES 1 (H) 0.0 - 0.5 %    ABS. NEUTROPHILS 4.9 1.8 - 8.0 K/UL    ABS. LYMPHOCYTES 0.8 0.8 - 3.5 K/UL    ABS. MONOCYTES 0.4 0.0 - 1.0 K/UL    ABS. EOSINOPHILS 0.1 0.0 - 0.4 K/UL    ABS.  BASOPHILS 0.0 0.0 - 0.1 K/UL ABS. IMM.  GRANS. 0.1 (H) 0.00 - 0.04 K/UL    DF SMEAR SCANNED      RBC COMMENTS MACROCYTOSIS  1+        RBC COMMENTS ANISOCYTOSIS  1+       METABOLIC PANEL, BASIC    Collection Time: 01/07/20  2:01 AM   Result Value Ref Range    Sodium 145 136 - 145 mmol/L    Potassium 5.2 (H) 3.5 - 5.1 mmol/L    Chloride 115 (H) 97 - 108 mmol/L    CO2 26 21 - 32 mmol/L    Anion gap 4 (L) 5 - 15 mmol/L    Glucose 67 65 - 100 mg/dL    BUN 50 (H) 6 - 20 MG/DL    Creatinine 1.99 (H) 0.55 - 1.02 MG/DL    BUN/Creatinine ratio 25 (H) 12 - 20      GFR est AA 28 (L) >60 ml/min/1.73m2    GFR est non-AA 23 (L) >60 ml/min/1.73m2    Calcium 8.4 (L) 8.5 - 10.1 MG/DL   GLUCOSE, POC    Collection Time: 01/07/20  6:59 AM   Result Value Ref Range    Glucose (POC) 70 65 - 100 mg/dL    Performed by [address] [place] [address], POC    Collection Time: 01/07/20  7:22 AM   Result Value Ref Range    Glucose (POC) 85 65 - 100 mg/dL    Performed by Sarah Stewart, POC    Collection Time: 01/07/20 11:11 AM   Result Value Ref Range    Glucose (POC) 80 65 - 100 mg/dL    Performed by [address], POC    Collection Time: 01/07/20 11:36 AM   Result Value Ref Range    Glucose (POC) 102 (H) 65 - 100 mg/dL    Performed by [address], POC    Collection Time: 01/07/20  4:08 PM   Result Value Ref Range    Glucose (POC) 75 65 - 100 mg/dL    Performed by [address], POC    Collection Time: 01/07/20  4:31 PM   Result Value Ref Range    Glucose (POC) 77 65 - 100 mg/dL    Performed by [address], POC    Collection Time: 01/07/20  4:49 PM   Result Value Ref Range    Glucose (POC) 89 65 - 100 mg/dL    Performed by [address], POC    Collection Time: 01/07/20  9:15 PM   Result Value Ref Range    Glucose (POC) 65 65 - 100 mg/dL    Performed by Ganga Crowe    GLUCOSE, POC    Collection Time: 01/07/20  9:49 PM   Result Value Ref Range    Glucose (POC) 84 65 - 100 mg/dL    Performed by Genesis Elizabeth    GLUCOSE, POC    Collection Time: 01/08/20  6:33 AM   Result Value Ref Range    Glucose (POC) 58 (L) 65 - 100 mg/dL    Performed by Tanner Escamilla    GLUCOSE, POC    Collection Time: 01/08/20  7:27 AM   Result Value Ref Range    Glucose (POC) 125 (H) 65 - 100 mg/dL    Performed by Hugo Alarcon, POC    Collection Time: 01/08/20 11:08 AM   Result Value Ref Range    Glucose (POC) 70 65 - 100 mg/dL    Performed by [address], POC    Collection Time: 01/08/20 11:55 AM   Result Value Ref Range    Glucose (POC) 101 (H) 65 - 100 mg/dL    Performed by [address], POC    Collection Time: 01/08/20  4:42 PM   Result Value Ref Range    Glucose (POC) 76 65 - 100 mg/dL    Performed by [address], POC    Collection Time: 01/08/20  5:03 PM   Result Value Ref Range    Glucose (POC) 64 (L) 65 - 100 mg/dL    Performed by Jackson Carmichael            Physical Exam on Discharge:    Discharge condition: good    Vital signs:   Patient Vitals for the past 12 hrs:   Temp Pulse Resp BP SpO2   01/08/20 1540 97.2 °F (36.2 °C) 63 16 164/61 97 %   01/08/20 1515 97.8 °F (36.6 °C) 63 16 (!) 156/96 97 %   01/08/20 0746 97.5 °F (36.4 °C) 65 16 148/67 98 %   01/08/20 0656  63  168/72        Visit Vitals  /61 (BP 1 Location: Left arm, BP Patient Position: At rest)   Pulse 63   Temp 97.2 °F (36.2 °C)   Resp 16   Ht 5' 5\" (1.651 m)   Wt 63 kg (138 lb 14.4 oz)   SpO2 97%   BMI 23.11 kg/m²     General:  Alert, cooperative, no distress, appears stated age. Head:  Normocephalic, without obvious abnormality, atraumatic. Lungs:   Clear to auscultation bilaterally. Chest wall:  No tenderness or deformity. Heart:  Regular rate and rhythm, S1, S2 normal, no murmur, click, rub or gallop. Abdomen:   Soft, non-tender. Bowel sounds normal. No masses,  No organomegaly. Extremities: Extremities normal, atraumatic, no cyanosis or edema. Pulses: 2+ and symmetric all extremities. Neurologic: CNII-XII intact. Normal strength, sensation and reflexes throughout. Current Discharge Medication List      START taking these medications    Details   amLODIPine (NORVASC) 10 mg tablet Take 1 Tab by mouth daily. Qty: 30 Tab, Refills: 0      epoetin jones-epbx (RETACRIT) 10,000 unit/mL injection 2 mL by SubCUTAneous route every seven (7) days. Indications: anemia due to kidney failure  Qty: 1 mL, Refills: 0      furosemide (LASIX) 40 mg tablet Three times a week Mon, Wed and Friday  Qty: 30 Tab, Refills: 0      patiromer calcium sorbitex (VELTASSA) 8.4 gram powder Take 8.4 g by mouth every Monday, Wednesday, Friday. Qty: 10 Packet, Refills: 0         CONTINUE these medications which have CHANGED    Details   carvedilol (COREG) 6.25 mg tablet Take 1 Tab by mouth two (2) times daily (with meals). Qty: 60 Tab, Refills: 0      hydrALAZINE (APRESOLINE) 100 mg tablet Take 1 Tab by mouth three (3) times daily. Qty: 90 Tab, Refills: 0      losartan (COZAAR) 50 mg tablet Take 1 Tab by mouth daily. Qty: 30 Tab, Refills: 0         CONTINUE these medications which have NOT CHANGED    Details   guaiFENesin (ROBITUSSIN) 100 mg/5 mL liquid Take 200 mg by mouth every four (4) hours as needed for Cough. sodium polystyrene (KIONEX, WITH SORBITOL,) 15-19.3 gram/60 mL suspension Take 15 g by mouth now.      melatonin 3 mg tablet Take  by mouth.      lovastatin (MEVACOR) 20 mg tablet TAKE 1 TABLET BY MOUTH EVERY DAY  Qty: 90 Tab, Refills: 1      docusate sodium (COLACE) 50 mg capsule Take 100 mg by mouth daily. multivitamin with iron tablet Take 1 Tab by mouth daily. [employer] Vitamins      benzonatate (TESSALON) 100 mg capsule Take 100 mg by mouth every eight (8) hours as needed for Cough. polyethylene glycol (MIRALAX) 17 gram packet Take 17 g by mouth daily. L. acidoph & paracasei- S therm- Bifido (EH-Q/RISAQUAD) 8 billion cell cap cap Take 1 Cap by mouth daily.   Qty: 10 Cap, Refills: 0 ondansetron hcl (ZOFRAN) 4 mg tablet Take 4 mg by mouth every eight (8) hours as needed for Nausea. OTHER Rollator Walker with seat  icd 10 - G20  Qty: 1 Each, Refills: 0      acetaminophen (TYLENOL) 500 mg tablet Take 500 mg by mouth every six (6) hours as needed for Pain. carbidopa-levodopa (SINEMET)  mg per tablet Take 2 Tabs by mouth four (4) times daily. STOP taking these medications       doxycycline (ADOXA) 100 mg tablet Comments:   Reason for Stopping:                  Total time spent on discharge planning, counseling and co-ordination of care:   35 minutes    Holly Sprague MD  01/08/20  3:30 PM

## 2020-01-09 NOTE — PROGRESS NOTES
Transitional Care Team: Discharge HUG Note      Noted late discharge on 1/8. HUG NP performed discharge medication reconciliation and contacted hospitalist regarding deletion of Losartan at discharge. Discharge summary indicated this was to be continued. Informed unit nurse when [employer] failed to deliver message. Hospitalist paged and issue resolved. Reviewed labs and VS--no issues noted. Stopped in to see patient in case family visiting. Patient alone and resting in bed with eyes closed. Returning by ambulance to the [address] [facility] [place].

## 2020-01-09 NOTE — PROGRESS NOTES
Hospital Discharge Follow-Up      Date/Time:  1/9/2020 10:10 AM    Polina Alegria RN, [facility], [facility]  Care transitions nurse [phone]  [facility] Coordination Team    Pt is cared for in long term care - NN will contact facility for discharge overview -ongoing management with facility - [address] [place] [address] unit. Patient was admitted to [facility] on 1/1/20 and discharged on 1/20/20  for dyspnea, hypoxia, bilateral pneumonia, anemia, chronic CKD. The physician discharge summary was available at the time of outreach. Patient was contacted within 1 business days of discharge. Call to and reached pt's daughter, Lanre Carver lives at the [address] [facility] unit  [phone]/ charge nurse Regla Elizabeth. Call to and reached Regla Elizabeth - charge nurse to discuss the hospital visit and discharge -   Dr. Jose Juan Sen will be the facility provider - (staff message sent to provider re: discharge)  CTN will call Dr. Yulisa Todd re: follow up appt. and convey to Regla Elizabeth. Pt has appt for 1/30 and nurse will ask if he wants sooner - for post hospital.  Daughter asked for PT for pt - discussed with Regla Elizabeth; message to Dr. Jose Juan Sen in this regard. Pt's poor nutrition is pre hospital and ongoing - she did not eat this morning - per nurse. Top Challenges reviewed with the provider   Bilateral pneumonia with presentation of dyspnea and hypoxia  Anemia requiring transfusion - PRBC - coordinated by nephrology  Parkinsons disease and debility - dementia -   Immobility, debility - daughter requesting P. T -   Solitary kidney  Pt is not eating -     Advance Care Planning:   Does patient have an Advance Directive:  not on file; education provided        Method of communication with provider :chart routing, staff message, phone    Inpatient RRAT score: 21  Was this a readmission? no   Patient stated reason for the readmission: n/a    Care Transition Nurse (CTN) contacted the family by telephone to perform post hospital discharge assessment. Verified name and  with caregiver as identifiers. Provided introduction to self, and explanation of the CTN role. Caregiver received hospital discharge instructions. CTN reviewed discharge instructions and red flags with caregiver who verbalized understanding. Caregiver given an opportunity to ask questions and does not have any further questions or concerns at this time. The caregiver agrees to contact the PCP office for questions related to their healthcare. CTN provided contact information for future reference. Disease Specific:   Pneumonia, CKD, anemia, hypertension; solitary kidney. Patients top risk factors for readmission:  depression, functional cognitive ability, functional physical ability, ineffective coping, medical condition, stages of grief, support system, transportation, utilization of services    Home Health orders at discharge: daughter requested physical therapy - discussed with Allen Beal - she will address with Dr. Helga Zafar: n/a   Date of initial visit: pending - MD referral    Durable Medical Equipment ordered at discharge: none  [address]: none  Durable Medical Equipment received: n/a    Medication(s):   New Medications at Discharge: Amlodipine 10 mg every day, Epoetin 10,000 / weekly, Lasix 40 mg Mon, Wed, Fri - ; Veltassa8.4 grams M, W, F -   Changed Medications at Discharge: Coreg 6.25 mg bid, Hydralazine 100 mg tid, Losartan 50 mg qd  Discontinued Medications at Discharge: none    Medication reconciliation was performed with caregiver, who verbalizes understanding of administration of home medications. There were no barriers to obtaining medications identified at this time. Referral to Pharm D needed: no     [facility] follow up appointment(s): No future appointments.    Non-[facility] follow up appointment(s): Dr. Dee Carmona - nephrology [phone] - pt sees him monthly - does she needs post hospital appt?  (pt is seen by provider at [facility] - Dr. Elizabeth Moeller - ); Pt has appt for Jan 30 at nephrology - they agreed to ask provider if sooner appt is needed. [facility]:  information provided as a resource     Goals      Attends follow-up appointments as directed. 1/9/20  Pt has care management with Dr. Elizabeth Moeller - at the [facility] [phone]. Call to Dr. Shira Pelayo - to check on appt - pt has appt on 1/30   CTN asked office to check with him to see if sooner appt is needed. Félix Middleton RN, [facility], [facility]  Care transitions nurse [phone]  [facility] Coordination Team       Knowledge and adherence of prescribed medication (ie. action, side effects, missed dose, etc.).      1/9/20  Pt d/c with new meds - Amlodipine 10 mg every day, Epoetin injection every 7 days, Lasix 40 mg Mon, Wed, Fri, Veltessa 8.4 g M, W, F;     Changes - Coreg 6.25 mg bid, Hydralazine 100 mg tid, Losartan 50 mg every day -     Med changes and additions discussed with Thi Patel at the [facility].  ttk       Prevent complications post hospitalization.  Understands red flags post discharge. 1/9/20  Pt to be followed by Dr. Elizabeth Moeller - at long term care -  Pt will need follow up CXR/ and labs - CBC and BMP/ CMP   Pt has nephrology follow up 1/30     Pt post bilateral pneumonia. Pt is not eating well -     Facility will discuss with pt's daughter - poor eating pattern, and debility. Pt remains full code - at facility.     ttk          Félix Middleton RN, [facility], [facility]  Care transitions nurse [phone]  [facility] Coordination Team

## 2020-01-09 NOTE — ROUTINE PROCESS
Verbal report given to [address] [place] [address] at the [address] [place] [address] by Bari guerra Report included the following information SBAR, Kardex and Recent Results.

## 2020-01-09 NOTE — PROGRESS NOTES
Patient blood sugar running low. 4:42 pm: BG 76-gave patient apple juice. 5:03 pm: BG 64    5:34:  after a round of IV dextrose. Along with food and apple juice. Dr. Theron Osgood aware.

## 2020-01-15 NOTE — CDMP QUERY
RETRO QUERY Pt admitted with Pneumonia/Pt noted to have CKD stage 3 with stable renal labs. If possible, please document in the progress notes and d/c summary if you are evaluating and / or treating any of the following: ?  with ATN Ruled Out 
? CKD stage 3 progressed to stage 4 (GFR 15-29) ? CKD stage 3 only ? Other, please specify ? Clinically unable to determine The medical record reflects the following: 
  Risk Factors: CKD Clinical Indicators: 
 
1/2/2020 01:44 BUN: 53 (H) Creatinine: 2.07 (H) GFR est AA: 27 (L) 
 
 
 
1/7/2020 02:01 
BUN: 50 (H) Creatinine: 1.99 (H) GFR est AA: 28 (L) 
 
 
 
H/P Acute on chronic CKD stage 3- likely prerenal. Received 1L IV fluids in ER 
 
1/3-nephrology  on CKD: 
- likely from ATN from infection + mild volume depletion - Cr stable - would d/c IVF Treatment: nephrology consult, IV fluids, lab monitoring Thank you, 
       Courtney Iniguez [facility], [address] [facility]

## 2020-01-19 PROBLEM — E16.2 HYPOGLYCEMIA: Status: ACTIVE | Noted: 2020-01-01

## 2020-01-19 PROBLEM — J96.00 ACUTE RESPIRATORY FAILURE (HCC): Status: ACTIVE | Noted: 2020-01-01

## 2020-01-19 NOTE — PROGRESS NOTES
1400: Bedside and Verbal shift change report given to HUGO Terrell (oncoming nurse) by Rose Mary Camarillo RN (offgoing nurse). Report included the following information SBAR, Kardex, Intake/Output, MAR, Recent Results, Cardiac Rhythm SB-SR with 1st degree AVB, Alarm Parameters  and Dual Neuro Assessment. 1430: Tube feedings started per order. 1520: Dr. Magali Dennis at bedside, updated on patient's condition, and clarified standing orders for D10% and Dopamine gtt. No new orders received, RN to check patient's BG every hour and continue to monitor patient closely. 1540: Clarified with Dr. Magali Dennis about dopamine order goal of therapy, MD okay not starting drip if patient's HR stays above 55 BPM.  
 
1850: Spoke with Dr. Carole Merino about patient's condition(HR in 46s) and the ordered Dopamine goal of therapy(HR>60), MD okay with not starting Dopamine if patient's HR is in the 50s and not hypotensive/symptomatic.  
 
1900: Patient's temperature 98.8, Kareem hugger support reduced to low settings, turned off at [address].  
 
2025:Pt's HR dropped to 30s while turning on her left side with head flat, HOB elevated, but no pharmacological intervention needed for patient's vital signs to be stable. RN to continue to monitor patient. 2143: 125 ml D10% given per order for BG 76 
 
2200: Repeat  
 
2330: Bedside and Verbal shift change report given to Mika Peterson RN (oncoming nurse) by Ynes Beckham RN (offgoing nurse). Report included the following information SBAR, Kardex, Intake/Output, MAR, Recent Results, Cardiac Rhythm SB with 1st degree AVB, Alarm Parameters  and Dual Neuro Assessment.

## 2020-01-19 NOTE — ED PROVIDER NOTES
Please note that this dictation was completed with [employer], the computer voice recognition software.  Quite often unanticipated grammatical, syntax, homophones, and other interpretive errors are inadvertently transcribed by the computer software.  Please disregard these errors.  Please excuse any errors that have escaped final proofreading. Pt BIBEMS from NH 'she has been unresponsive x 3 days'; 2 IV attempts/ unsuccessful;  
BS checked on arrival = 16; GCS = 3; POA contacted by Nursing/ 'She is a Full-code';  
2 amps D50 given, gcs = 6; BS finger now 293, but unchanged mentation/ unresponsiveness/ will intubate;  
Labs/ branch/ ct head ordered; pcxr to verify intubation 59-year-old American female past medical history markable for anemia, arthritis, renal mass, CKD, dementia with Lewy bodies, environmental allergies, hypertension, hyperlipidemia, Parkinson's, NINI: BSO, thyroid disease, brought in by EMS for \"unresponsive x3 days at the nursing home. She is on oxygen normally at 2 L at the nursing home, EMS is placed her on nonrebreather at 6 L 100% saturation rest of her vital signs stable. Patient is completely unresponsive GCS of 3 upon arrival. 
 
NH records reviewed by myself;  
EMR reviewed; Past Medical History:  
Diagnosis Date  Anemia  Arthritis  Chronic kidney disease RENAL MASS  Dementia with Lewy bodies ([facility] [address] 03/01/2017  Environmental allergies  HTN (hypertension) 1/19/2012  Hyperlipemia 1/19/2012  Hypertension  Other ill-defined conditions(799.89)   
 parkinson disease  Parkinson disease ([facility] [address] 1/19/2012  S/P radioactive iodine thyroid ablation 1/19/2012  S/P NINI-BSO 1/19/2012  S/P TKR (total knee replacement) 1/19/2012  Thyroid disease   
 goiter/hyperthyroid (s/p iodine tx 1999)  Unspecified adverse effect of anesthesia CLAUSTROPHOBIA Past Surgical History:  
Procedure Laterality Date  ENDOSCOPY, COLON, DIAGNOSTIC  7/2011  
 (Daniel)  HX GI    
 COLONOSCOPY  
 HX ORTHOPAEDIC    
 r hip, l knee replacement 2008 [address] Family History:  
Problem Relation Age of Onset  Coronary Artery Disease Father   
     s/p MI  
 Heart Disease Father  Elevated Lipids Father  Cancer Brother   
     hodgkins  Anesth Problems Neg Hx Social History Socioeconomic History  Marital status:  Spouse name: Not on file  Number of children: Not on file  Years of education: Not on file  Highest education level: Not on file Occupational History  Not on file Social Needs  Financial resource strain: Not on file  Food insecurity:  
  Worry: Not on file Inability: Not on file  Transportation needs:  
  Medical: Not on file Non-medical: Not on file Tobacco Use  Smoking status: Never Smoker  Smokeless tobacco: Never Used Substance and Sexual Activity  Alcohol use: No  
  Alcohol/week: 0.0 standard drinks  Drug use: No  
 Sexual activity: Never Lifestyle  Physical activity:  
  Days per week: Not on file Minutes per session: Not on file  Stress: Not on file Relationships  Social connections:  
  Talks on phone: Not on file Gets together: Not on file Attends Jewish service: Not on file Active member of club or organization: Not on file Attends meetings of clubs or organizations: Not on file Relationship status: Not on file  Intimate partner violence:  
  Fear of current or ex partner: Not on file Emotionally abused: Not on file Physically abused: Not on file Forced sexual activity: Not on file Other Topics Concern [address] Service Not Asked  Blood Transfusions Not Asked  Caffeine Concern Not Asked  Occupational Exposure Not Asked [place] Hazards Not Asked  Sleep Concern Not Asked  Stress Concern Not Asked  Weight Concern Not Asked  Special Diet Not Asked  Back Care Not Asked  Exercise Not Asked  Bike Helmet Not Asked  Seat Belt Not Asked  Self-Exams Not Asked Social History Narrative  Not on file ALLERGIES: Bactroban [mupirocin calcium]; Betadine [povidone-iodine]; Comtan [entacapone]; Erythromycin; Penicillins; and Sulfa (sulfonamide antibiotics) Review of Systems Unable to perform ROS: Patient unresponsive Vitals:  
 01/19/20 0801 01/19/20 0815 BP: 113/47 143/51 Pulse: 71 76 Resp: 16 18 SpO2: 100% 98% Physical Exam 
Constitutional:   
   General: She is not in acute distress. Appearance: Normal appearance. She is normal weight. She is ill-appearing. She is not toxic-appearing or diaphoretic. Comments: BIBEMS on NRB;   
HENT:  
   Head: Normocephalic and atraumatic. Right Ear: External ear normal.  
   Left Ear: External ear normal.  
   Nose: Nose normal.  
   Mouth/Throat:  
   Mouth: Mucous membranes are moist.  
   Comments: Dentures Eyes:  
   General:     
   Right eye: No discharge. Left eye: No discharge. Pupils: Pupils are equal, round, and reactive to light. Comments: PERRL at 2mm; Neck: Musculoskeletal: Neck rigidity present. Vascular: No carotid bruit. Cardiovascular:  
   Rate and Rhythm: Normal rate and regular rhythm. Pulses: Normal pulses. Heart sounds: Normal heart sounds. No murmur. No friction rub. No gallop. Pulmonary:  
   Effort: Pulmonary effort is normal. No respiratory distress. Breath sounds: Rhonchi present. No wheezing or rales. Abdominal:  
   General: There is no distension. Comments: No moaning w;/ palp Genitourinary: 
   Comments: [place] Musculoskeletal:     
   General: Swelling present. No signs of injury. Right lower leg: No edema. Left lower leg: No edema. Comments: Noted R ant CW port;   
Lymphadenopathy:  
   Cervical: No cervical adenopathy.   
Skin: 
 Capillary Refill: Capillary refill takes less than 2 seconds. Coloration: Skin is not jaundiced or pale. Findings: No bruising, erythema, lesion or rash. MDM Number of Diagnoses or Management Options Hypoglycemia:  
Hypothermia, initial encounter:  
Persistent vegetative state [facility]):  
Risk of Complications, Morbidity, and/or Mortality Presenting problems: high Diagnostic procedures: high Management options: high Critical Care Total time providing critical care: 30-74 minutes (55 min) Patient Progress Patient progress: stable Intubation Date/Time: 1/19/2020 8:45 AM 
Performed by: Naresh Draper MD 
Authorized by: Naresh Draper MD  
 
Consent:  
  Consent obtained:  Emergent situation Consent given by:  Patient Risks discussed:  Aspiration, death, brain injury, bleeding, dental trauma, hypoxia, laryngeal injury and pneumothorax Alternatives discussed:  No treatment Pre-procedure details:  
  Patient status:  Unresponsive Mallampati score:  II 
  Pretreatment meds: etomidate. Paralytics:  Rocuronium Procedure details:  
  Preoxygenation:  Nonrebreather mask CPR in progress: no Intubation method:  Oral 
  Oral intubation technique:  Video-assisted Laryngoscope blade: Mac 3 Tube size (mm):  4.5 Tube type:  Cuffed Number of attempts:  1 Ventilation between attempts: no   
  Cricoid pressure: yes Tube visualized through cords: yes Placement assessment: ETT to lip:  23 ETT to teeth:  21 Tube secured with:  ETT sierra Breath sounds:  Absent over the epigastrium and reduced on left Placement verification: chest rise, condensation, CXR verification, direct visualization, equal breath sounds and ETCO2 detector CXR findings:  ETT in proper place Post-procedure details:  
  Patient tolerance of procedure: Tolerated well, no immediate complications Chief Complaint Patient presents with  Unresponsive 8:44 AM 
The patients presenting problems have been discussed, and they are in agreement with the care plan formulated and outlined with them. I have encouraged them to ask questions as they arise throughout their visit. MEDICATIONS GIVEN: 
Medications  
dextrose (D50W) injection syrg 25 g (0 g IntraVENous Held 1/19/20 0818) dextrose (D50W) injection syrg 25 g (25 g IntraVENous Given 1/19/20 0800) dextrose (D50W) injection syrg 25 g (25 g IntraVENous Given 1/19/20 0810)  
etomidate (AMIDATE) 2 mg/mL injection 20 mg (20 mg IntraVENous Given 1/19/20 0836) rocuronium injection 100 mg (100 mg IntraVENous Given 1/19/20 0837) LABS REVIEWED: 
Labs Reviewed CBC WITH AUTOMATED DIFF - Abnormal; Notable for the following components:  
    Result Value RBC 3.02 (*) HGB 9.3 (*) HCT 30.5 (*) .0 (*)   
 RDW 15.8 (*) NEUTROPHILS 78 (*) IMMATURE GRANULOCYTES 1 (*) All other components within normal limits GLUCOSE, POC - Abnormal; Notable for the following components:  
 Glucose (POC) 16 (*) All other components within normal limits GLUCOSE, POC - Abnormal; Notable for the following components:  
 Glucose (POC) 302 (*) All other components within normal limits GLUCOSE, POC - Abnormal; Notable for the following components:  
 Glucose (POC) 293 (*) All other components within normal limits CULTURE, BLOOD URINE CULTURE HOLD SAMPLE METABOLIC PANEL, COMPREHENSIVE  
TROPONIN I  
LACTIC ACID  
SAMPLES BEING HELD  
NT-PRO BNP  
LIPASE URINALYSIS W/MICROSCOPIC  
SAMPLE TO BLOOD BANK  
 
 
RADIOLOGY RESULTS: 
The following have been ordered and reviewed: 
_____________________________________________________________________ 
_____________________________________________________________________ EKG interpretation:  
Rhythm: normal sinus rhythm with a 1st degree AV Block; and regular . Rate (approx.): 70;  Axis: normal; P wave: normal; QRS interval: normal ; ST/T wave: normal; Negative acute significant segmental elevations/ unchanged compared to study dated 11/06/2018 PROCEDURES: 
 
 
 
CONSULTATIONS:  
 
 
PROGRESS NOTES: 
 
 
DIAGNOSIS: 
 
1. Persistent vegetative state ([facility] [address] 2. Hypoglycemia 3. Hypothermia, initial encounter PLAN: 
1- 
 
 
ED COURSE: The patients hospital course has been uncomplicated. 8:44 AM 
POA/ family updated;  
 
9:54 AM 
POA relates 'last time thids happened, she had PNA (new-years thelma); She agrees w/ actions taken/ transfer/ admission to [facility] ICU; [place]ist [facility] Serve for Admission 9:55 AM 
 
ED Room Number: HQT22/19 Patient Name and age:  Wing Bernstein 80 y.o.  female Working Diagnosis: 1. Persistent vegetative state ([facility] [address] 2. Hypoglycemia 3. Hypothermia, initial encounter Readmission: no 
Isolation Requirements:  no 
Recommended Level of Care:  ICU Code Status:  Full Code Department:[facility] Adult ED - [phone] Other:  Vanc and Cefepime/ Intubated/ R ant CW port

## 2020-01-19 NOTE — ED TRIAGE NOTES
Patient arriving via EMS from the [address] [facility] for c/o 'unresponsiveness x 3 days, and gurgling episode this morning'. Patient on 4 L NC at baseline.

## 2020-01-19 NOTE — PROGRESS NOTES
1200 Pt arrived to unit from SPED, placed on monitor, HR 32- pulse palpable. HR back to 60 NSR within ~30seconds. . Primary Nurse Sabi Neumann RN and Cara Lai RN performed a dual skin assessment on this patient Impairment noted- see wound doc flow sheet Chester score is 7. 
 
 
1210 Pt bradycardic- 30s again with turn- self-limiting, femoral pulse detected by Doppler. 65 Dr Berhane Snider at bedside to evaluate pt, updated on pt status. MD aware of episodes of bradycardia x2 since pt arrival. 
1230 ~30s episode of bradycardia again. Dr aLst Host at bedside. Verbal order for atropine. Pt back in NSR 60s after medication pulled. Verbal orders for q1h blood sugars and D5LR 500cc bolus. 1310 Dr Berhane Snider made aware of CXR results by this RN. 1400 Dr. Last Host at bedside to discuss pt status with pt's daughter, David Mensah. Pt to remain FULL CODE. 
 
1430 Bedside and Verbal shift change report given to HUGO Terrell (oncoming nurse) by Tiago Arango RN (offgoing nurse). Report included the following information SBAR, Kardex, ED Summary, Procedure Summary, Intake/Output, MAR and Recent Results.

## 2020-01-19 NOTE — ED NOTES
TRANSFER - OUT REPORT: 
 
Verbal report given to Ricarda Khalil RN(name) on Laly Gardiner  being transferred to ICU bed 13(unit) for change in patient condition(critical) Report consisted of patients Situation, Background, Assessment and  
Recommendations(SBAR). Information from the following report(s) SBAR, ED Summary, Intake/Output and Recent Results was reviewed with the receiving nurse. Lines:  
Venous Access Device Port (Active) Peripheral IV 01/19/20 Right Wrist (Active) Site Assessment Clean, dry, & intact 1/19/2020 10:59 AM  
Phlebitis Assessment 0 1/19/2020 10:59 AM  
Infiltration Assessment 0 1/19/2020 10:59 AM  
Dressing Status Clean, dry, & intact 1/19/2020 10:59 AM  
Hub Color/Line Status Blue;Flushed;Patent 1/19/2020 10:59 AM  
Action Taken Blood drawn 1/19/2020 10:59 AM  
  
 
Opportunity for questions and clarification was provided. Patient transported with: 
 Monitor, ventilator, IV Vancomyocin infusing

## 2020-01-19 NOTE — ED NOTES
Ana Maria Bass MD at bedside to intubate patient related to GCS, 6. Dentures removed prior to intubation,  21@ gums, 23 @ lip, 7.5 ETT used, +color change.  Decreased lung sounds to the LEFT per Ana Maria Bass MD.

## 2020-01-20 NOTE — PROGRESS NOTES
[facility] 
 
ICU TEAM Progress Note Name: Elvie Malhotra : 1928 MRN: [id number] Date: 2020 Assessment:  
 
ICU Problems: 1. Acute metabolic encephalopathy 2. Acute respiratory failure 3. Profound hypoglycemia prior to admission 4. Sinus bradycardia 5. Parkinson's disease 6. Dementia 7. CHF 8. Recent pneumonia 9. Hypernatremia 
a. ?Secondary to dehydration 10. Leukocytosis 
a. WBC count 11 up from 6. 
b. Tmax 99.8. 11. Acute? on chronic kidney disease 
a. Creatinine 2.43. ICU Comprehensive Plan of Care:  
 
Plans for this Shift:  
 
1. Continue AC ventilator support. 2. Monitor for s/s of infection 3. Continue tube feeds. 4. Monitor blood glucose levels. 5. Dopamine if needed for symptomatic bradycardia 6. Start Cefepime, 2 grams daily. 7. Recheck creatinine this afternoon. 8. Consult nephrology. Cardiac Gtts: None SBP Goal of: > 90 mmHg MAP Goal of: > 65 mmHg Transfusion Trigger (Hgb): <7 g/dL Respiratory Goals: Chlorhexidine Optimize PEEP/Ventilation/Oxygenation Goal Tidal Volume 6 cc/kg based on IBW Aim for lung protective ventilation Head of bed > 30 degrees SPO2 Goal: > 92% Pulmonary toilet: Incentive Spirometry DVT Prophylaxis (if no, list reason): SCD's or Sequential Compression Device GI Prophylaxis: Pepcid (famotidine) Nutrition: Yes tube feeding IVFs: D5 1/2 Bowel Movement: Pending Bowel Regimen: None needed at this time Trejo Catheter Present: Yes Glycemic Control - Insulin: Yes Antibiotics:Cefepime Pain Medications: None Target RASS: 0 - Alert & Calm - Spontaneously pays attention to caregiver Sedation Medications: N/A 
CAM-ICU:  Positive Mobility: Poor PT/OT: PT consulted and on board and OT consulted and on board Restraints: None needed at this time Discussed Plan of Care/Code Status: Full Code T/L/D Tubes: ETT and Orogastric Tube Lines: Peripheral IV Drains: Trejo Catheter Subjective: Progress Note: 1/20/2020 Reason for ICU Admission: Ms. Darcy Moeller is 79 yo AAF with h/o CKD, dementia, Parkinson's disease, anemia and hypertension. She was recently discharged on 1/8 after treatment of pneumonia. She lives in nursing home. She was sent to [place] [facility] due to altered mental status and hypothermia. Her BG was found to be 16 in ER. She was given a bolus of d50W which improved her blood sugar. She was also emergently intubated for airway protection. She was sent to [facility] ICU for further care and monitoring. Currently she is unresponsive other than occasional eye fluttering. She does not follow commands. She remains intubated for airway protection. Spoke with daughter who states that her mom was active in the nursing home prior to this event, working with PT and feeding herself. Overnight Events: Asymptomatic bradycardia at times into the 30's not requiring intervention. Active Problem List:  
 
Problem List  Date Reviewed: 12/13/2017 Codes Class Acute respiratory failure (HCC) ICD-10-CM: J96.00 
ICD-9-CM: 518.81 Hypoglycemia ICD-10-CM: E16.2 ICD-9-CM: 251.2 Hypothermia ICD-10-CM: T68. Mozelle Jimmie ICD-9-CM: 991.6 Pneumonia ICD-10-CM: J18.9 ICD-9-CM: 229 Fall ICD-10-CM: W19. Mozalexis Jimmie ICD-9-CM: E888.9 Generalized weakness ICD-10-CM: R53.1 ICD-9-CM: 780.79 Laceration of head ICD-10-CM: S01. 91XA ICD-9-CM: 873.8 Parkinson's disease dementia [facility] ICD-10-CM: G20, F02.80 ICD-9-CM: 332.0, 294.10 Pre-syncope ICD-10-CM: R55 
ICD-9-CM: 780.2 Renal cell carcinoma (HCC) ICD-10-CM: C64.9 ICD-9-CM: 189.0 Overview Signed 7/3/2014  3:30 PM by Perez Membreno MD  
  S/p Left partial nephrectomy, 3/2014, History of screening mammography (Chronic) ICD-10-CM: Z92.89 ICD-9-CM: V15.89  Overview Addendum 3/12/2013  1:25 PM by Perez Membreno MD  
  3/12/13, normal, [address] [facility] [address] 
  
  
   
 Parkinson disease ([facility] ICD-10-CM: G20 
ICD-9-CM: 332.0 Overview Addendum 7/3/2014  3:31 PM by Marvin Doss MD  
  Advanced disease, Dr. Sharmin Harrington HTN (hypertension) ICD-10-CM: I10 
ICD-9-CM: 401.9 Hyperlipemia ICD-10-CM: E78.5 ICD-9-CM: 272.4 S/P TKR (total knee replacement) ICD-10-CM: Y67.991 ICD-9-CM: V43.65 Overview Addendum 1/19/2012  8:16 PM by Marvin Doss MD  
  bilateral , 2000, Dr. Teressa Antunez S/P radioactive iodine thyroid ablation ICD-10-CM: Z92.3 ICD-9-CM: V45.89 Overview Signed 1/19/2012  8:15 PM by Marvin Doss MD  
  8297 S/P YARED-BSO ICD-10-CM: Z90.710, Z90.722, Z90.79 ICD-9-CM: V88.01, V45.77 Environmental allergies ICD-10-CM: Z91.09 
ICD-9-CM: V15.09 Anemia ICD-10-CM: D64.9 ICD-9-CM: 285.9 Past Medical History:  
 
 has a past medical history of Anemia, Arthritis, Chronic kidney disease, Dementia with Lewy bodies ([facility] (03/01/2017), Environmental allergies, HTN (hypertension) (1/19/2012), Hyperlipemia (1/19/2012), Hypertension, Other ill-defined conditions(799.89), Parkinson disease ([facility] (1/19/2012), S/P radioactive iodine thyroid ablation (1/19/2012), S/P YARED-BSO (1/19/2012), S/P TKR (total knee replacement) (1/19/2012), Thyroid disease, and Unspecified adverse effect of anesthesia. Past Surgical History:  
 
 has a past surgical history that includes hx yared and bso (1946); endoscopy, colon, diagnostic (7/2011); hx gi; and hx orthopaedic. Home Medications:  
 
Prior to Admission medications Medication Sig Start Date End Date Taking? Authorizing Provider  
carvedilol (COREG) 6.25 mg tablet Take 1 Tab by mouth two (2) times daily (with meals). 1/8/20   Grant Vogel MD  
hydrALAZINE (APRESOLINE) 100 mg tablet Take 1 Tab by mouth three (3) times daily. 1/8/20   Grant Vogel MD  
amLODIPine (NORVASC) 10 mg tablet Take 1 Tab by mouth daily.  1/9/20   Grant Vogel MD  
 epoetin jones-epbx (RETACRIT) 10,000 unit/mL injection 2 mL by SubCUTAneous route every seven (7) days. Indications: anemia due to kidney failure 1/13/20   Jen Mark MD  
furosemide (LASIX) 40 mg tablet Three times a week Mon, Wed and Friday 1/10/20   Jen Mark MD  
patiromer calcium sorbitex (VELTASSA) 8.4 gram powder Take 8.4 g by mouth every Monday, Wednesday, Friday. 1/8/20   Jen Mark MD  
losartan (COZAAR) 50 mg tablet Take 1 Tab by mouth daily. 1/9/20   Jen Mark MD  
benzonatate (TESSALON) 100 mg capsule Take 100 mg by mouth every eight (8) hours as needed for Cough. Provider, Historical  
guaiFENesin (ROBITUSSIN) 100 mg/5 mL liquid Take 200 mg by mouth every four (4) hours as needed for Cough. Provider, Historical  
polyethylene glycol (MIRALAX) 17 gram packet Take 17 g by mouth daily. Provider, Historical  
sodium polystyrene (KIONEX, WITH SORBITOL,) 15-19.3 gram/60 mL suspension Take 15 g by mouth now. Provider, Historical  
melatonin 3 mg tablet Take  by mouth. Provider, Historical  
L. acidoph & paracasei- S therm- Bifido (EH-Q/RISAQUAD) 8 billion cell cap cap Take 1 Cap by mouth daily. 11/9/18   Milan Maria MD  
ondansetron hcl (ZOFRAN) 4 mg tablet Take 4 mg by mouth every eight (8) hours as needed for Nausea. Provider, Historical  
OTHER Rollator Walker with seat 
icd 10 - G20 5/25/16   Milly Burnette MD  
lovastatin (MEVACOR) 20 mg tablet TAKE 1 TABLET BY MOUTH EVERY DAY 5/24/16   Milly Burnette MD  
docusate sodium (COLACE) 50 mg capsule Take 100 mg by mouth daily. Provider, Historical  
acetaminophen (TYLENOL) 500 mg tablet Take 500 mg by mouth every six (6) hours as needed for Pain. Provider, Historical  
carbidopa-levodopa (SINEMET)  mg per tablet Take 2 Tabs by mouth four (4) times daily. Provider, Historical  
multivitamin with iron tablet Take 1 Tab by mouth daily.  [employer]    Provider, Historical  
 
 
 Allergies/Social/Family History: Allergies Allergen Reactions  Bactroban [Mupirocin Calcium] Other (comments) Skin peels  Betadine [Povidone-Iodine] Rash  Comtan [Entacapone] Diarrhea  Erythromycin Other (comments) GI upset  Penicillins Itching  Sulfa (Sulfonamide Antibiotics) Itching Social History Tobacco Use  Smoking status: Never Smoker  Smokeless tobacco: Never Used Substance Use Topics  Alcohol use: No  
  Alcohol/week: 0.0 standard drinks Family History Problem Relation Age of Onset  Coronary Artery Disease Father   
     s/p MI  
 Heart Disease Father  Elevated Lipids Father  Cancer Brother   
     hodgkins  Anesth Problems Neg Hx Review of Systems:  
 
Review of systems not obtained due to patient factors. Objective:  
Vital Signs: 
Visit Vitals /61 Pulse 60 Temp 97.2 °F (36.2 °C) Resp 12 Ht 5' 5\" (1.651 m) Wt 57 kg (125 lb 10.6 oz) SpO2 100% BMI 20.91 kg/m² O2 Device: Endotracheal tube, Ventilator Temp (24hrs), Av.7 °F (36.5 °C), Min:94.2 °F (34.6 °C), Max:99.8 °F (37.7 °C) Intake/Output:  
 
Intake/Output Summary (Last 24 hours) at 2020 1404 Last data filed at 2020 1300 Gross per 24 hour Intake 3935 ml Output 241 ml Net 3694 ml Physical Exam: 
 
General:  Frail appearing female in NAD. Eyes:  Sclera anicteric. Mouth/Throat: Mucous membranes normal.  ETT in place. Neck: Supple Lungs:   Clear diminished to auscultation bilaterally CV:  Regular rate and rhythm, bradycardic at times Abdomen:   Soft. Bowel sounds normal. Non-distended. Extremities: No cyanosis or edema. Skin: Skin color, texture, turgor normal. No rash or lesions. Musculoskeletal: No swelling or deformity. Lines/Devices:  Intact, no erythema, drainage or tenderness LABS AND  DATA: Personally reviewed Recent Labs  
  20 
0551 20 
1428 WBC 11.1* 6.5 HGB 8.3* 9.2* HCT 27.2* 29.4*  
 202 Recent Labs  
  01/20/20 
0551 01/19/20 
1428 * 147* K 4.4 3.7 * 118* CO2 24 24 BUN 38* 37* CREA 2.43* 2.14* * 170* CA 7.9* 8.0*  
MG 1.7 1.7 PHOS 2.7 2.5* Recent Labs  
  01/19/20 
0820 01/19/20 
9519 SGOT 27  --   
AP 77  --   
TP 5.8*  --   
ALB 2.1*  --   
GLOB 3.7  --   
LPSE  --  88 Recent Labs  
  01/19/20 
8427 INR 1.3* PTP 12.7* Recent Labs  
  01/20/20 
0613 01/19/20 
1500 PHI 7.389 7.460* PCO2I 37.1 33.1*  
PO2I 41* 191* FIO2I 40 50 Recent Labs  
  01/19/20 
0820 TROIQ <0.05 Ventilator Settings: 
Mode Rate Tidal Volume Pressure FiO2 PEEP Assist control, Volume control 15 375 ml  5 cm H2O 40 % 5 cm H20 Peak airway pressure: 28 cm H2O Minute ventilation: 6.79 l/min MEDS: Reviewed Chest X-Ray: CXR Results  (Last 48 hours) 01/19/20 1243  XR CHEST PORT Final result Impression:  IMPRESSION:   
   
Satisfactory height of the endotracheal tube. It is unclear whether this the  
endotracheal tube was pulled back, there is left main stem mucous plugging, or  
the ETT is really esophageal in location. Complete consolidation of the left lung. Jacqueline Speaker RN was given this result at 1304 hours. Narrative:  EXAM: XR CHEST PORT INDICATION: ETT  
   
COMPARISON: 0856 hours FINDINGS: A portable AP radiograph of the chest was obtained at 1240 hours. The  
patient is on a cardiac monitor. Endotracheal tube tip lies about 2 cm above the  
hunter. There is now new consolidation of the entire left lower lobe. The NG  
tube extends below the hemidiaphragm. The Port-A-Cath is in place. . Bilateral  
erosive shoulder arthropathy is noted. 01/19/20 0924  XR CHEST PORT Final result Impression:  IMPRESSION: Left basilar subsegmental atelectasis versus effusion. Low position  
of endotracheal tube.   
  
 Narrative:  EXAM: XR CHEST PORT  
   
 INDICATION: intubated/ altered COMPARISON: 1/7/2020 FINDINGS: A portable AP radiograph of the chest was obtained at 0859 hours. The  
patient is on a cardiac monitor. The Port-A-Cath terminates at the cavoatrial  
junction. The NG tube extends into the stomach. The endotracheal tube terminates  
close to the hunter. The heart is enlarged. The left base is obscured. Bilateral  
glenohumeral arthritis is present. Multidisciplinary Rounds Completed: Yes ABCDEF Bundle/Checklist Completed: 
Yes SPECIAL EQUIPMENT None DISPOSITION Stay in ICU CRITICAL CARE CONSULTANT NOTE I had a face to face encounter with the patient, reviewed and interpreted patient data including clinical events, labs, images, vital signs, I/O's, and examined patient. I have discussed the case and the plan and management of the patient's care with the consulting services, the bedside nurses and the respiratory therapist.   
 
NOTE OF PERSONAL INVOLVEMENT IN CARE This patient has a high probability of imminent, clinically significant deterioration, which requires the highest level of p   reparedness to intervene urgently. I participated in the decision-making and personally managed or directed the management of the following life and organ supporting interventions that required my frequent assessment to treat or prevent imminent deterioration. I personally spent 45 minutes of critical care time. This is time spent at this critically ill patient's bedside actively involved in patient care as well as the coordination of care and discussions with the patient's family. This does not include any procedural time which has been billed separately. Jaci Burton, LIBORIO-BC, MSN [facility] Critical Care 
1/20/2020

## 2020-01-20 NOTE — PROGRESS NOTES
NUTRITION COMPLETE ASSESSMENT 
 
RECOMMENDATIONS:  
[place] liquid MVI Discontinue IVF once sodium WNL Interventions/Plan:  
Food/Nutrient Delivery:  Modify rate, concentration, composition, and schedule: Jevity 1.5 @ 35 ml/hr with 1 packet Prosource daily Assessment:  
Reason for Assessment:  
[x] Provider Consult-Tube Feeding management Tube Feeding: Jevity 1.5 @ 20 ml/hr; advance to goal 40 ml/hr with 200 ml water flush q 4 hr 
Diet: NPO Nutritionally Significant Medications: [x] Reviewed & Includes: Colace, Mevacor, D5 1/2 NS with KCL @ 75 ml/hr Subjective: 
 
Objective: Ms Sarahi Wasserman was admitted with acute respiratory failure. PMHx: CKD, Dementia with Lewy bodies, HTN, Parkinson's, anemia. Noted: severe hypoglycemia on admission (BG 16), intubated for airway protection. Per review of weight trends in EHR-Ms Anna has lost~14% body weight in the past 8 months. Appears to have temporal, clavicular and scapular wasting. Edema of all extremities. Recently hospitalized and noted to have poor appetite-suspect related to dementia. Lytes WNL-no sign of refeeding. Continue to monitor. Sodium elevated on admission but slowly trending down. IVF ordered and provides 1800 ml, and 306 dextrose calories per day. BUN and creatinine elevated d/t dehydration; ?baseline values. Hypoglycemia resolved. Tube feeding at goal will provide 960 ml, 1440 calories, 61 gm protein and 1930 ml free water which exceeds pt's energy needs. Recommend reducing tube feeding to 35 ml/hr with 1 packet Prosource daily to provide 840 ml, 1320 calories, 69 gm protein and 700 ml free water (1900 including flush). Once sodium WNL-reduce flush to 110 ml q 4 hr. Recommend adding MVI to meet DRI's with above tube feeding. Estimated Nutrition Needs:  
Kcals/day: 1250 Kcals/day Protein: 68 g(1.2g/kg) Fluid: (1 ml/kcal) Based On: [facility] (2260V) Weight Used: Actual wt(57 kg) Pt expected to meet estimated nutrient needs:  [x]   Yes via tube feeding     []  No  [] Unable to predict at this time Nutrition Diagnosis:  
1. Inadequate oral intake related to severe hypoglycema, metabolic encephalopathy, respiratory failure as evidenced by NPO d/t intubation. Goals:   
 Tube feeding to meet 90% estimated needs x 5-7 days. Monitoring & Evaluation: - Enteral/parenteral nutrition intake - Electrolyte and renal profile, Weight/weight change, Glucose profile, CV-pulmonary Previous Nutrition Goals Met: N/A Previous Recommendations: N/A Education & Discharge Needs: 
 [x] None Identified 
 [] Identified and addressed [x] Participated in care plan, discharge planning, and/or interdisciplinary rounds Cultural, Druze and ethnic food preferences identified: 
 None Skin Integrity: [x]Intact  []Other Edema: []None [x] generalized anasarca, pitting; 2+ pitting B/L upper and lower extremiteis Last BM: PTA Food Allergies: [x]None []Other Anthropometrics:   
Weight Loss Metrics 1/20/2020 1/4/2020 5/18/2019 11/6/2018 11/3/2018 12/13/2017 5/25/2016 Today's Wt 125 lb 10.6 oz 138 lb 14.4 oz 145 lb - 125 lb 129 lb 4.8 oz 134 lb BMI 20.91 kg/m2 23.11 kg/m2 24.13 kg/m2 21.46 kg/m2 21.46 kg/m2 22.19 kg/m2 22.99 kg/m2 Last 3 Recorded Weights in this Encounter 01/19/20 5380 01/19/20 1638 01/20/20 0386 Weight: 57 kg (125 lb 10.6 oz) 57 kg (125 lb 10.6 oz) 57 kg (125 lb 10.6 oz) Weight Source: Bed Height: 5' 5\" (165.1 cm), Body mass index is 20.91 kg/m². IBW : 56.7 kg (125 lb), % IBW (Calculated): 100.53 % 
 ,   
 
Labs:   
Lab Results Component Value Date/Time  Sodium 146 (H) 01/20/2020 05:51 AM  
 Potassium 4.4 01/20/2020 05:51 AM  
 Chloride 116 (H) 01/20/2020 05:51 AM  
 CO2 24 01/20/2020 05:51 AM  
 Glucose 119 (H) 01/20/2020 05:51 AM  
 BUN 38 (H) 01/20/2020 05:51 AM  
 Creatinine 2.43 (H) 01/20/2020 05:51 AM  
 Calcium 7.9 (L) 01/20/2020 05:51 AM  
 Magnesium 1.7 01/20/2020 05:51 AM  
 Phosphorus 2.7 01/20/2020 05:51 AM  
 Albumin 2.1 (L) 01/19/2020 08:20 AM  
 
No results found for: HBA1C, HGBE8, DTR8DYPK, LFW3SQNH, IEB7AFUE Lab Results Component Value Date/Time Glucose (POC) 74 01/20/2020 09:27 AM  
  
Lab Results Component Value Date/Time ALT (SGPT) 6 (L) 01/19/2020 08:20 AM  
 AST (SGOT) 27 01/19/2020 08:20 AM  
 Alk.  phosphatase 77 01/19/2020 08:20 AM  
 Bilirubin, total 0.3 01/19/2020 08:20 AM  
  
 
Deep Haley RD [facility]

## 2020-01-20 NOTE — WOUND CARE
Wound Care Note:  
 
New consult placed by nurse request for open skin tear over right ischium Chart shows: 
Admitted for acute respiratory failure and hypoglycemia Past Medical History:  
Diagnosis Date  Anemia  Arthritis  Chronic kidney disease RENAL MASS  Dementia with Lewy bodies ([facility] 03/01/2017  Environmental allergies  HTN (hypertension) 1/19/2012  Hyperlipemia 1/19/2012  Hypertension  Other ill-defined conditions(799.89)   
 parkinson disease  Parkinson disease ([facility] 1/19/2012  S/P radioactive iodine thyroid ablation 1/19/2012  S/P NINI-BSO 1/19/2012  S/P TKR (total knee replacement) 1/19/2012  Thyroid disease   
 goiter/hyperthyroid (s/p iodine tx 1999)  Unspecified adverse effect of anesthesia CLAUSTROPHOBIA  
 
WBC = 11.1 on 1/20/20 Admitted from [address] Assessment:  
Patient is intubated, non-verbal while this nurse in room, incontinent with moderate assistance needed in repositioning. Bed: Total Care Sport Patient wearing briefs for incontinence and has a Trejo. Diet: Tube feeding Patient did not grimace or moan with repositioning. Bilateral heels and buttocks skin intact and without erythema. 1. POA sacrum with two open areas surrounded by hyperpigmentation, unstageable pressure injury, area measures 4.5 cm x 5 cm x 0.1 cm, wound on right sacrum with dark, dusky wound bed, central wound is pink, both wounds are non-blanchable, no drainage, wound edges are open. Venelex ointment is being applied. 2.  POA right lower back with hyperpigmentation. Probably patient's natural pigmentation. Patient being cleaned up, PCT and nurse to reposition and offload heels. Recommendations:   
Sacrum, bilateral heels and any reddened bony prominence- Every 8 hours liberally apply Venelex ointment Skin Care & Pressure Prevention: Minimize layers of linen/pads under patient to optimize support surface. Turn/reposition approximately every 2 hours and offload heels. Manage incontinence / promote continence Nourishing Skin Cream to dry skin, minimize use of briefs when able Discussed above plan with patient & HUGO Welsh Transition of Care: Plan to follow as needed while admitted to hospital. 
 
Aniceto Duncan" Angelia Crigler, BSN, RN, [facility], [facility]. 
office [phone] 
pager [phone] or call  to page

## 2020-01-20 NOTE — PROGRESS NOTES
0730: Bedside and Verbal shift change report given to Anitha ARIAS  (oncoming nurse) by Richard Arellano RN  (offgoing nurse). Report included the following information SBAR, Kardex, Intake/Output, MAR, Recent Results and Alarm Parameters . 1930: Bedside and Verbal shift change report given to [address]  (oncoming nurse) by Yanick Hernandez RN  (offgoing nurse). Report included the following information SBAR, Kardex, Intake/Output, MAR, Recent Results and Cardiac Rhythm NSR.

## 2020-01-20 NOTE — PROGRESS NOTES
Transitions of Care Return to [facility] Will need BLS transport Reason for Admission:   Found unresponsive by nursing home staff, blood glucose 16. RRAT Score:    28/ high Resources/supports as identified by patient/family:   In LTC at [facility] Top Challenges facing patient (as identified by patient/family and CM): Finances/Medication cost?    Insurance: Medicare A,B with [employer] Medicaid Transportation? Will need BLS transport Support system or lack thereof? Daughter : Gisell Shanks [phone] Living arrangements? Resides in LTC Self-care/ADLs/Cognition? Needs total assistance Current Advanced Directive/Advance Care Plan:  Not on file, Howard Gisell Shanks is BON [facility] Plan for utilizing home health:   NA  
              
Patient transferred from [facility] ED with blood glucose of 16. Patient is in [address] [place] [address] at [facility] [employer]. Care management will follow for transitions of care. Mamta Honeycutt RN,CRM Care Management Interventions PCP Verified by CM: Yes(Dr Williamson) Transition of Care Consult (CM Consult): Long Term Care(Patient resides at [facility]) MyChart Signup: No 
Discharge Durable Medical Equipment: No 
Physical Therapy Consult: No 
Occupational Therapy Consult: No 
Speech Therapy Consult: No 
Current Support Network: (Daughter Gisell Shanks [phone]) Confirm Follow Up Transport: (Will need ambulance transport)

## 2020-01-20 NOTE — PROGRESS NOTES
01/20/20 8508 Weaning Parameters Spontaneous Breathing Trial Complete Yes Resp Rate Observed 16 Ve 5.2  RSBI 56 SBT Results cuff leak presented

## 2020-01-21 NOTE — PROGRESS NOTES
[facility] 
 
ICU TEAM Progress Note Name: Amado Pinedo : 1928 MRN: [id number] Date: 2020 Subjective:  
Progress Note: 2020 CC: Altered mental status Reason for ICU Admission: Hypoglycemia Overnight Events: does not follow commands, opens eyes spontaneously, weaned off pressors POD:* No surgery found * S/P:  
 
Active Problem List:  
 
Problem List  Date Reviewed: 2017 Codes Class Acute respiratory failure (HCC) ICD-10-CM: J96.00 
ICD-9-CM: 518.81 Hypoglycemia ICD-10-CM: E16.2 ICD-9-CM: 251.2 Hypothermia ICD-10-CM: T68. Ursula Limbo ICD-9-CM: 991.6 Pneumonia ICD-10-CM: J18.9 ICD-9-CM: 719 Fall ICD-10-CM: W19. Ursula Limbo ICD-9-CM: E888.9 Generalized weakness ICD-10-CM: R53.1 ICD-9-CM: 780.79 Laceration of head ICD-10-CM: S01. 91XA ICD-9-CM: 873.8 Parkinson's disease dementia ([facility] ICD-10-CM: G20, F02.80 ICD-9-CM: 332.0, 294.10 Pre-syncope ICD-10-CM: R55 
ICD-9-CM: 780.2 Renal cell carcinoma (HCC) ICD-10-CM: C64.9 ICD-9-CM: 189.0 Overview Signed 7/3/2014  3:30 PM by Coleman Restrepo MD  
  S/p Left partial nephrectomy, 3/2014, History of screening mammography (Chronic) ICD-10-CM: Z92.89 ICD-9-CM: V15.89 Overview Addendum 3/12/2013  1:25 PM by Coleman Restrepo MD  
  3/12/13, [facility] ICD-10-CM: G20 
ICD-9-CM: 332.0 Overview Addendum 7/3/2014  3:31 PM by Coleman Restrepo MD  
  Advanced disease, Dr. Yoni Ocampo HTN (hypertension) ICD-10-CM: I10 
ICD-9-CM: 401.9 Hyperlipemia ICD-10-CM: E78.5 ICD-9-CM: 272.4 S/P TKR (total knee replacement) ICD-10-CM: N70.509 ICD-9-CM: V43.65 Overview Addendum 2012  8:16 PM by Coleman Restrepo MD  
  bilateral , , Dr. Sheryl Hickman S/P radioactive iodine thyroid ablation ICD-10-CM: Z92.3 ICD-9-CM: V45.89  Overview Signed 2012  8:15 PM by Coleman Restrepo MD  
 1999 
  
  
   
 S/P NINI-BSO ICD-10-CM: Z90.710, Z90.722, Z90.79 ICD-9-CM: V88.01, V45.77 Environmental allergies ICD-10-CM: Z91.09 
ICD-9-CM: V15.09 Anemia ICD-10-CM: D64.9 ICD-9-CM: 285.9 Past Medical History:  
 
 has a past medical history of Anemia, Arthritis, Chronic kidney disease, Dementia with Lewy bodies ([facility] (03/01/2017), Environmental allergies, HTN (hypertension) (1/19/2012), Hyperlipemia (1/19/2012), Hypertension, Other ill-defined conditions(799.89), Parkinson disease ([facility] (1/19/2012), S/P radioactive iodine thyroid ablation (1/19/2012), S/P NINI-BSO (1/19/2012), S/P TKR (total knee replacement) (1/19/2012), Thyroid disease, and Unspecified adverse effect of anesthesia. Past Surgical History:  
 
 has a past surgical history that includes hx nini and bso (1946); endoscopy, colon, diagnostic (7/2011); hx gi; and hx orthopaedic. Home Medications:  
 
Prior to Admission medications Medication Sig Start Date End Date Taking? Authorizing Provider  
carvedilol (COREG) 6.25 mg tablet Take 1 Tab by mouth two (2) times daily (with meals). 1/8/20   Berna Fontanez MD  
hydrALAZINE (APRESOLINE) 100 mg tablet Take 1 Tab by mouth three (3) times daily. 1/8/20   Berna Fontanez MD  
amLODIPine (NORVASC) 10 mg tablet Take 1 Tab by mouth daily. 1/9/20   Berna Fontanez MD  
epoetin jones-epbx (RETACRIT) 10,000 unit/mL injection 2 mL by SubCUTAneous route every seven (7) days. Indications: anemia due to kidney failure 1/13/20   Berna Fontanez MD  
furosemide (LASIX) 40 mg tablet Three times a week Mon, Wed and Friday 1/10/20   Berna Fontanez MD  
patiromer calcium sorbitex (VELTASSA) 8.4 gram powder Take 8.4 g by mouth every Monday, Wednesday, Friday. 1/8/20   Berna Fontanez MD  
losartan (COZAAR) 50 mg tablet Take 1 Tab by mouth daily.  1/9/20   Berna Fontanez MD  
benzonatate (TESSALON) 100 mg capsule Take 100 mg by mouth every eight (8) hours as needed for Cough. Provider, Historical  
guaiFENesin (ROBITUSSIN) 100 mg/5 mL liquid Take 200 mg by mouth every four (4) hours as needed for Cough. Provider, Historical  
polyethylene glycol (MIRALAX) 17 gram packet Take 17 g by mouth daily. Provider, Historical  
sodium polystyrene (KIONEX, WITH SORBITOL,) 15-19.3 gram/60 mL suspension Take 15 g by mouth now. Provider, Historical  
melatonin 3 mg tablet Take  by mouth. Provider, Historical  
L. acidoph & paracasei- S therm- Bifido (EH-Q/RISAQUAD) 8 billion cell cap cap Take 1 Cap by mouth daily. 11/9/18   Janki Guerra MD  
ondansetron hcl (ZOFRAN) 4 mg tablet Take 4 mg by mouth every eight (8) hours as needed for Nausea. Provider, Historical  
OTHER Rollator Walker with seat 
icd 10 - G20 5/25/16   Minda Aschoff, MD  
lovastatin (MEVACOR) 20 mg tablet TAKE 1 TABLET BY MOUTH EVERY DAY 5/24/16   Minda Aschoff, MD  
docusate sodium (COLACE) 50 mg capsule Take 100 mg by mouth daily. Provider, Historical  
acetaminophen (TYLENOL) 500 mg tablet Take 500 mg by mouth every six (6) hours as needed for Pain. Provider, Historical  
carbidopa-levodopa (SINEMET)  mg per tablet Take 2 Tabs by mouth four (4) times daily. Provider, Historical  
multivitamin with iron tablet Take 1 Tab by mouth daily. [facility]    Provider, Historical  
 
 
Allergies/Social/Family History: Allergies Allergen Reactions  Bactroban [Mupirocin Calcium] Other (comments) Skin peels  Betadine [Povidone-Iodine] Rash  Comtan [Entacapone] Diarrhea  Erythromycin Other (comments) GI upset  Penicillins Itching  Sulfa (Sulfonamide Antibiotics) Itching Social History Tobacco Use  Smoking status: Never Smoker  Smokeless tobacco: Never Used Substance Use Topics  Alcohol use: No  
  Alcohol/week: 0.0 standard drinks Family History Problem Relation Age of Onset  Coronary Artery Disease Father s/p MI  
 Heart Disease Father  Elevated Lipids Father  Cancer Brother   
     hodgkins  Anesth Problems Neg Hx Review of Systems:  
 
unable to obtain due to patient condition Objective:  
Vital Signs: 
Visit Vitals /57 Pulse 60 Temp 97.8 °F (36.6 °C) Resp 16 Ht 5' 5\" (1.651 m) Wt 57.9 kg (127 lb 10.3 oz) SpO2 100% BMI 21.24 kg/m² O2 Device: Endotracheal tube, Ventilator Temp (24hrs), Av.2 °F (36.2 °C), Min:96.8 °F (36 °C), Max:97.8 °F (36.6 °C) Intake/Output:  
 
Intake/Output Summary (Last 24 hours) at 2020 3338 Last data filed at 2020 0700 Gross per 24 hour Intake 3605 ml Output 501 ml Net 3104 ml Physical Exam: 
 
General:  appears stated age, open eyes does not follow commands Eye:  conjunctivae/corneas clear. PERRL Neurologic: open eyes spontaneously, does not track, does not move limbs spontaneously, withdraws to pain Neck:  normal, no erythema or exudates noted. and neck supple and symmetrical.  trachea midline Lungs:  clear to auscultation bilaterally, on mechanical ventilation Heart:  S1, S2 normal 
Abdomen:  soft, non-tender Skin:  Warm, dry, perfusing LABS AND  DATA: Personally reviewed Recent Labs  
  20 
0420 20 
0551 WBC 6.9 11.1* HGB 7.9* 8.3* HCT 25.5* 27.2*  
 194 Recent Labs  
  20 
0420 20 
1503 20 
0551  144 146*  
K 4.4 4.2 4.4  
* 114* 116* CO2 23 25 24 BUN 36* 37* 38* CREA 2.15* 2.35* 2.43* GLU 99 130* 119* CA 7.5* 7.5* 7.9*  
MG 2.2 1.6 1.7 PHOS 2.5*  --  2.7 Recent Labs  
  20 
0820 20 
1575 SGOT 27  --   
AP 77  --   
TP 5.8*  --   
ALB 2.1*  --   
GLOB 3.7  --   
LPSE  --  88 Recent Labs  
  20 
6282 INR 1.3* PTP 12.7* Recent Labs  
  20 
0613 20 
1500 PHI 7.389 7.460* PCO2I 37.1 33.1*  
PO2I 41* 191* FIO2I 40 50 Recent Labs  
  20 
0820 TROIQ <0.05 Hemodynamics:  
PAP:   CO:    
Wedge:   CI:    
CVP:    SVR:    
  PVR:    
 
Ventilator Settings: 
Mode Rate Tidal Volume Pressure FiO2 PEEP Spontaneous 15 375 ml  5 cm H2O 30 % 5 cm H20 Peak airway pressure: 25 cm H2O Minute ventilation: 6.75 l/min MEDS: Reviewed Chest X-Ray: CXR Results  (Last 48 hours) 01/21/20 0151  XR CHEST PORT Final result Impression:  IMPRESSION:  
Diminished pleural effusion and atelectasis on the left. Narrative:  PORTABLE CHEST RADIOGRAPH/S: 1/21/2020 1:51 AM  
   
Clinical history: Left-sided mucous plug INDICATION:   left side mucous plug, consolidation, re-evaluation COMPARISON: 1/19/2020 FINDINGS:  
AP portable upright view of the chest demonstrates a stable enlarged  
cardiopericardial silhouette. The lungs are adequately expanded. Left basilar  
atelectasis and effusion is diminished compared to the prior examination. ET  
tube and NG tube unchanged. Cord catheter on the right unchanged. The osseous  
structures are unremarkable. Patient is on a cardiac monitor. 01/19/20 1243  XR CHEST PORT Final result Impression:  IMPRESSION:   
   
Satisfactory height of the endotracheal tube. It is unclear whether this the  
endotracheal tube was pulled back, there is left main stem mucous plugging, or  
the ETT is really esophageal in location. Complete consolidation of the left lung. Tish Kelley RN was given this result at 1304 hours. Narrative:  EXAM: XR CHEST PORT INDICATION: ETT  
   
COMPARISON: 0856 hours FINDINGS: A portable AP radiograph of the chest was obtained at 1240 hours. The  
patient is on a cardiac monitor. Endotracheal tube tip lies about 2 cm above the  
hunter. There is now new consolidation of the entire left lower lobe. The NG  
tube extends below the hemidiaphragm. The Port-A-Cath is in place. . Bilateral  
erosive shoulder arthropathy is noted. 01/19/20 0924  XR CHEST PORT Final result Impression:  IMPRESSION: Left basilar subsegmental atelectasis versus effusion. Low position  
of endotracheal tube. Narrative:  EXAM: XR CHEST PORT INDICATION: intubated/ altered COMPARISON: 1/7/2020 FINDINGS: A portable AP radiograph of the chest was obtained at 0859 hours. The  
patient is on a cardiac monitor. The Port-A-Cath terminates at the cavoatrial  
junction. The NG tube extends into the stomach. The endotracheal tube terminates  
close to the hunter. The heart is enlarged. The left base is obscured. Bilateral  
glenohumeral arthritis is present. XR CHEST PORT Final Result IMPRESSION:  
Diminished pleural effusion and atelectasis on the left. XR ABD PORT  1 V Final Result IMPRESSION: No acute process. NG tube satisfactory position. XR CHEST PORT Final Result IMPRESSION:   
  
Satisfactory height of the endotracheal tube. It is unclear whether this the  
endotracheal tube was pulled back, there is left main stem mucous plugging, or  
the ETT is really esophageal in location. Complete consolidation of the left lung. Venus Patel RN was given this result at 1304 hours. XR ABD (KUB) Final Result IMPRESSION: NG tube tip overlying stomach CT HEAD WO CONT Final Result IMPRESSION: Small vessel ischemic white matter disease. Incidental bilateral  
maxillary sinus effusions. XR CHEST PORT Final Result IMPRESSION: Left basilar subsegmental atelectasis versus effusion. Low position  
of endotracheal tube. elevated EF of 45-50%, small pericardial effusion Assessment:  
 
ICU Problems: - Hypoglycemia 
- shock -  
- Hypokalemia 
- anemia - Acute encephalopathy 
- Ventilator dependent respiratory failure ICU Comprehensive Plan of Care:  
Plans for this Shift: 1. Fluids to LR 75cc/hr 2. Monitor mental status, patient continue to be unresponsive, no significant abnormality on CT, will consider MRI 3. SBT 4. Continue empiric abx for 7 days 5. Nephrology consulted for , likely ATN, will continue monitor I/O  
6. Will need GOC discussion if continue to be encephalopathic without improvement Multidisciplinary Rounds Completed: Yes ABCDEF Bundle/Checklist 
Pain Medications: None Target RASS: N/A Sedation Medications: None CAM-ICU:  unable to assess Mobility: Poor PT/OT: not ready Restraints: None needed at this time Discussed Plan of Care (goals of care): Yes Addressed Code Status: Full Code CARDIOVASCULAR Cardiac Gtts: None SBP Goal of: > 90 mmHg MAP Goal of: > 65 mmHg Transfusion Trigger (Hgb): <7 g/dL RESPIRATORY Vent Goals:  
Chlorhexidine Optimize PEEP/Ventilation/Oxygenation Aim for lung protective ventilation Head of bed > 30 degrees DVT Prophylaxis (if no, list reason): SCD's or Sequential Compression Device and Heparin SPO2 Goal: > 92% Pulmonary toilet: Duo-Nebs GI/ Trejo Catheter Present: Yes GI Prophylaxis: Pepcid (famotidine) Nutrition: Yes TF IVFs: [email or web address] Bowel Movement: No 
Bowel Regimen: None needed at this time Insulin: None, hypoglycemic ANTIBIOTICS Antibiotics: 
Cefepime T/L/D Tubes: ETT and Orogastric Tube Lines: Peripheral IV Drains: Trejo Catheter SPECIAL EQUIPMENT None DISPOSITION Stay in ICU CRITICAL CARE CONSULTANT NOTE I had a face to face encounter with the patient, reviewed and interpreted patient data including clinical events, labs, images, vital signs, I/O's, and examined patient. I have discussed the case and the plan and management of the patient's care with the consulting services, the bedside nurses and the respiratory therapist.   
 
NOTE OF PERSONAL INVOLVEMENT IN CARE This patient has a high probability of imminent, clinically significant deterioration, which requires the highest level of preparedness to intervene urgently. I participated in the decision-making and personally managed or directed the management of the following life and organ supporting interventions that required my frequent assessment to treat or prevent imminent deterioration. I personally spent 40 minutes of critical care time. This is time spent at this critically ill patient's bedside actively involved in patient care as well as the coordination of care and discussions with the patient's family. This does not include any procedural time which has been billed separately. Cynthia Leonardo MD 
Staff RENU/ Rain [address] 
1/21/2020

## 2020-01-21 NOTE — CONSULTS
NEPHROLOGY CONSULT NOTE Patient: Garett Arevalo MRN: [id number]  PCP: Yu Mayorga MD  
:     1928  Age:   80 y.o. Sex:  female Referring physician: Ruiz Cottrell MD 
Reason for consultation: 80 y.o. female with Hypoglycemia [E16.2] Acute respiratory failure (HCC) [P89.17] complicated by  Admission Date: 2020  7:58 AM  LOS: 2 days ASSESSMENT and PLAN :  
 on CKD: 
- likely 2/2 ATN 
- Cr close to baseline - check CPK 
- cont IVF, maintain MAP > 65 
- daily labs and strict I/Os for now 
  
Hypokalemia: 
- resolved, KCl d/c'd today 
  
Hypoglycemia: 
- per primary service Leukocytosis: 
- cultures pending, on abx - WBC improving CKD III: 
- baseline Cr around 2 prior to admission 
- from DM, HTN and reduced renal mass 
  
Hx of RCC s/p partial L nephrectomy 
  
HTN: 
-Continue current medications 
  
Chronic Anemia: 
- start IDA 
  
Recent LLL Pneumonia w/ effusion 
  
DM2: 
- on insulin Dementia Parkinon's Active Problems / Assessment AAActive  : Active Problems: 
  Acute respiratory failure ([facility] [address] (2020) Hypoglycemia (2020) Subjective: HPI: Garett Arevalo is a 80 y.o.  female who has been admitted to the hospital for hypoglycemia. She has CKD III, hx of RCC s/p partial L nephrectomy, dementia, Parkinson's who was recently here w/ PNA. Admitted for BG of 18, hypothermia after being found unresponsive at her NH for several days. She was intubated for airway protection. Her Cr on arrival was 2.3, peaked around 2.5 and is down to 2.15 today. UOP 500cc overnight, about 20-40cc/hr per RN. BP stable, not on pressors. Getting LR now at 75cc/hr. Remains unresponsive on the vent, off sedation. 
  
 
Past Medical Hx:  
Past Medical History:  
Diagnosis Date  Anemia  Arthritis  Chronic kidney disease RENAL MASS  Dementia with Lewy bodies ([facility] [address] 2017  Environmental allergies  HTN (hypertension) 1/19/2012  Hyperlipemia 1/19/2012  Hypertension  Other ill-defined conditions(799.89)   
 parkinson disease  Parkinson disease ([facility] 1/19/2012  S/P radioactive iodine thyroid ablation 1/19/2012  S/P NINI-BSO 1/19/2012  S/P TKR (total knee replacement) 1/19/2012  Thyroid disease   
 goiter/hyperthyroid (s/p iodine tx 1999)  Unspecified adverse effect of anesthesia CLAUSTROPHOBIA Past Surgical Hx: 
  
Past Surgical History:  
Procedure Laterality Date  ENDOSCOPY, COLON, DIAGNOSTIC  7/2011  
 ([place])  HX GI    
 COLONOSCOPY  
 HX ORTHOPAEDIC    
 r hip, l knee replacement 2008 [facility] Medications: 
Prior to Admission medications Medication Sig Start Date End Date Taking? Authorizing Provider  
carvedilol (COREG) 6.25 mg tablet Take 1 Tab by mouth two (2) times daily (with meals). 1/8/20   Susy Felipe MD  
hydrALAZINE (APRESOLINE) 100 mg tablet Take 1 Tab by mouth three (3) times daily. 1/8/20   Susy Felipe MD  
amLODIPine (NORVASC) 10 mg tablet Take 1 Tab by mouth daily. 1/9/20   Susy Felipe MD  
epoetin jones-epbx (RETACRIT) 10,000 unit/mL injection 2 mL by SubCUTAneous route every seven (7) days. Indications: anemia due to kidney failure 1/13/20   Susy Felipe MD  
furosemide (LASIX) 40 mg tablet Three times a week Mon, Wed and Friday 1/10/20   Susy Felipe MD  
patiromer calcium sorbitex (VELTASSA) 8.4 gram powder Take 8.4 g by mouth every Monday, Wednesday, Friday. 1/8/20   Susy Felipe MD  
losartan (COZAAR) 50 mg tablet Take 1 Tab by mouth daily. 1/9/20   Susy Felipe MD  
benzonatate (TESSALON) 100 mg capsule Take 100 mg by mouth every eight (8) hours as needed for Cough. Provider, Historical  
guaiFENesin (ROBITUSSIN) 100 mg/5 mL liquid Take 200 mg by mouth every four (4) hours as needed for Cough. Provider, Historical  
polyethylene glycol (MIRALAX) 17 gram packet Take 17 g by mouth daily. Provider, Historical  
sodium polystyrene (KIONEX, WITH SORBITOL,) 15-19.3 gram/60 mL suspension Take 15 g by mouth now. Provider, Historical  
melatonin 3 mg tablet Take  by mouth. Provider, Historical  
L. acidoph & paracasei- S therm- Bifido (EH-Q/RISAQUAD) 8 billion cell cap cap Take 1 Cap by mouth daily. 11/9/18   Jj Hernandez MD  
ondansetron hcl (ZOFRAN) 4 mg tablet Take 4 mg by mouth every eight (8) hours as needed for Nausea. Provider, Historical  
OTHER Rollator Walker with seat 
icd 10 - G20 5/25/16   Shaheen Le MD  
lovastatin (MEVACOR) 20 mg tablet TAKE 1 TABLET BY MOUTH EVERY DAY 5/24/16   Shaheen Le MD  
docusate sodium (COLACE) 50 mg capsule Take 100 mg by mouth daily. Provider, Historical  
acetaminophen (TYLENOL) 500 mg tablet Take 500 mg by mouth every six (6) hours as needed for Pain. Provider, Historical  
carbidopa-levodopa (SINEMET)  mg per tablet Take 2 Tabs by mouth four (4) times daily. Provider, Historical  
multivitamin with iron tablet Take 1 Tab by mouth daily. [place] [facility]    Provider, Historical  
 
 
Allergies Allergen Reactions  Bactroban [Mupirocin Calcium] Other (comments) Skin peels  Betadine [Povidone-Iodine] Rash  Comtan [Entacapone] Diarrhea  Erythromycin Other (comments) GI upset  Penicillins Itching  Sulfa (Sulfonamide Antibiotics) Itching Social Hx:  reports that she has never smoked. She has never used smokeless tobacco. She reports that she does not drink alcohol or use drugs. Family History Problem Relation Age of Onset  Coronary Artery Disease Father   
     s/p MI  
 Heart Disease Father  Elevated Lipids Father  Cancer Brother   
     hodgkins  Anesth Problems Neg Hx Review of Systems: 
Unable to obtain due to patient's condition Objective:   
Vitals:   
Vitals:  
 01/21/20 0700 01/21/20 0715 01/21/20 0719 01/21/20 0800 BP: 144/57   140/55 Pulse: 73 68 60 73 Resp: 15 15 16 20 Temp:    97.9 °F (36.6 °C) SpO2: 100% 100% 100% 100% Weight:      
Height:      
 
I&O's:  01/20 0701 - 01/21 0700 In: 6547 [I.V.:2000] Out: 526 [Urine:526] Visit Vitals /55 (BP 1 Location: Right arm, BP Patient Position: At rest) Pulse 73 Temp 97.9 °F (36.6 °C) Resp 20 Ht 5' 5\" (1.651 m) Wt 57.9 kg (127 lb 10.3 oz) SpO2 100% BMI 21.24 kg/m² Physical Exam: 
General:on the vent, unresponsive HEENT: ET tube in place Neck:Supple,no mass palpable Lungs : reduced breath sounds b/l CVS: RRR, S1 S2 normal, No rub, no LE edema Abdomen: Soft, Non tender, No hepatosplenomegaly, bowel sounds present Extremities: No cyanosis, No clubbing Skin: No rash or lesions. Lymph nodes: No palpable nodes MS: No joint swelling, erythema, warmth Neurologic: unresponsive : branch in place Laboratory Results: 
 
Lab Results Component Value Date BUN 36 (H) 01/21/2020  01/21/2020  
 K 4.4 01/21/2020  (H) 01/21/2020 CO2 23 01/21/2020 Lab Results Component Value Date BUN 36 (H) 01/21/2020 BUN 37 (H) 01/20/2020 BUN 38 (H) 01/20/2020 BUN 37 (H) 01/19/2020 BUN 40 (H) 01/19/2020  
 K 4.4 01/21/2020  
 K 4.2 01/20/2020  
 K 4.4 01/20/2020  
 K 3.7 01/19/2020  
 K 4.8 01/19/2020 Lab Results Component Value Date WBC 6.9 01/21/2020 RBC 2.56 (L) 01/21/2020 HGB 7.9 (L) 01/21/2020 HCT 25.5 (L) 01/21/2020 MCV 99.6 (H) 01/21/2020 MCH 30.9 01/21/2020 RDW 15.3 (H) 01/21/2020  01/21/2020 Lab Results Component Value Date PHOS 2.5 (L) 01/21/2020 Urine dipstick:  
Lab Results Component Value Date/Time  Color DARK YELLOW 12/31/2019 11:43 PM  
 Appearance CLOUDY (A) 12/31/2019 11:43 PM  
 Specific gravity 1.019 12/31/2019 11:43 PM  
 pH (UA) 5.0 12/31/2019 11:43 PM  
 Protein 300 (A) 12/31/2019 11:43 PM  
 Glucose NEGATIVE  12/31/2019 11:43 PM  
 Ketone NEGATIVE  12/31/2019 11:43 PM  
 Bilirubin NEGATIVE  12/31/2019 11:43 PM  
 Urobilinogen 1.0 12/31/2019 11:43 PM  
 Nitrites NEGATIVE  12/31/2019 11:43 PM  
 Leukocyte Esterase NEGATIVE  12/31/2019 11:43 PM  
 Epithelial cells FEW 12/31/2019 11:43 PM  
 Bacteria 2+ (A) 12/31/2019 11:43 PM  
 WBC 0-4 12/31/2019 11:43 PM  
 RBC 0-5 12/31/2019 11:43 PM  
 
 
 
 
 
Thank you for allowing us to participate in the care of this patient. We will follow patient. Please dont hesitate to call with any questions Kell Gutierrez MD 
1/21/2020 [address] [place] [address], [facility] Phone - [phone] Fax - [phone] 
[email or web address]. [email or web address]

## 2020-01-21 NOTE — PROGRESS NOTES
01/21/20 6731 Weaning Parameters Spontaneous Breathing Trial Complete Yes Resp Rate Observed 18 Ve 6.4  RSBI 55 SBT Results, cuffed leak presented

## 2020-01-21 NOTE — PROGRESS NOTES
1930: Bedside and Verbal shift change report given to [address] (oncoming nurse) by Dennis Newman RN (offgoing nurse). Report included the following information SBAR, Kardex, ED Summary, MAR, Recent Results, and Cardiac Rhythm sinus mariluz . 0300: HR was in 30s. HR went back to 60s without intervention. NP Hannah walton.

## 2020-01-22 NOTE — PROGRESS NOTES
Nephrology Progress Note Russell Gomez Date of Admission : 1/19/2020 CC: Follow up for  on CKD Assessment and Plan  on CKD: 
- likely 2/2 ATN 
- Cr close to baseline - CPK ok - d/c IVF 
- lasix 40mg IV x 1 
- daily labs 
  
Hypokalemia: 
- resolved 
  
Hypoglycemia: 
- per primary service 
  
Leukocytosis: 
- cultures pending, on abx - WBC improving 
  
CKD III: 
- baseline Cr around 2 prior to admission 
- from DM, HTN and reduced renal mass 
  
Hx of RCC s/p partial L nephrectomy 
  
HTN: 
-Continue current medications 
  
Chronic Anemia: 
- start IDA 
  
Recent LLL Pneumonia w/ effusion 
  
DM2: 
- on insulin 
  
Dementia 
  
Parkinon's Interval History: 
Seen and examined. Cr down. Oliguric. On IVF, on the vent. MRI findings noted. Unable to obtain ROS. Current Medications: all current  Medications have been eviewed in [facility] Review of Systems: Pertinent items are noted in HPI. Objective: 
Vitals:   
Vitals:  
 01/22/20 0500 01/22/20 0600 01/22/20 0700 01/22/20 0800 BP: 161/68 119/51 142/60 127/48 Pulse: 63 64 68 66 Resp: 17 20 18 16 Temp:      
SpO2: 100% 100% 100% 100% Weight:      
Height:      
 
Intake and Output: 
No intake/output data recorded. 01/20 1901 - 01/22 0700 In: 8272 [I.V.:2725] Out: 845 [Urine:845] Physical Examination: 
Pt intubated    Yes General:          unresponsive Neck:  Supple, no mass Resp:  Coarse breath sounds b/l CV:  RRR,  no murmur or rub, no LE edema GI:  Soft, NT, + Bowel sounds, no hepatosplenomegaly Neurologic:  unresponsive Psych:             Unable to assess Skin:  No Rash :  Trejo in place 
 
[]    High complexity decision making was performed 
[]    Patient is at high-risk of decompensation with multiple organ involvement Lab Data Personally Reviewed: I have reviewed all the pertinent labs, microbiology data and radiology studies during assessment. Recent Labs  
  01/22/20 
0325 01/21/20 6213 01/20/20 
1503 01/20/20 
6005  140 144 146*  
K 4.6 4.4 4.2 4.4  
* 112* 114* 116* CO2 24 23 25 24 GLU 79 99 130* 119* BUN 36* 36* 37* 38* CREA 2.02* 2.15* 2.35* 2.43* CA 7.9* 7.5* 7.5* 7.9*  
MG 2.0 2.2 1.6 1.7 PHOS 2.6 2.5*  --  2.7 Recent Labs  
  01/22/20 
0325 01/21/20 
0420 01/20/20 
2704 WBC 6.7 6.9 11.1* HGB 8.3* 7.9* 8.3* HCT 27.2* 25.5* 27.2*  
 167 194 Lab Results Component Value Date/Time Specimen Description: [facility] 02/19/2014 11:12 AM  
 Specimen Description: STOOL 03/19/2009 10:03 AM  
 
Lab Results Component Value Date/Time Culture result: HEAVY NORMAL RESPIRATORY EH 01/19/2020 02:28 PM  
 Culture result: NO GROWTH 3 DAYS 01/19/2020 08:20 AM  
 Culture result: KLEBSIELLA PNEUMONIAE (A) 12/31/2019 11:43 PM  
 
Recent Results (from the past 24 hour(s)) CK Collection Time: 01/21/20 11:34 AM  
Result Value Ref Range  (H) 26 - 192 U/L  
GLUCOSE, POC Collection Time: 01/21/20 12:19 PM  
Result Value Ref Range Glucose (POC) 78 65 - 100 mg/dL Performed by Jennifer Valenzuela GLUCOSE, POC Collection Time: 01/21/20 12:37 PM  
Result Value Ref Range Glucose (POC) 161 (H) 65 - 100 mg/dL Performed by Jennifer Valenzuela GLUCOSE, POC Collection Time: 01/21/20  8:44 PM  
Result Value Ref Range Glucose (POC) 85 65 - 100 mg/dL Performed by Zackary Cruz GLUCOSE, POC Collection Time: 01/21/20 11:45 PM  
Result Value Ref Range Glucose (POC) 71 65 - 100 mg/dL Performed by Zackary Cruz GLUCOSE, POC Collection Time: 01/22/20  1:34 AM  
Result Value Ref Range Glucose (POC) 85 65 - 100 mg/dL Performed by Verlan Krill METABOLIC PANEL, BASIC Collection Time: 01/22/20  3:25 AM  
Result Value Ref Range Sodium 140 136 - 145 mmol/L Potassium 4.6 3.5 - 5.1 mmol/L Chloride 111 (H) 97 - 108 mmol/L  
 CO2 24 21 - 32 mmol/L Anion gap 5 5 - 15 mmol/L Glucose 79 65 - 100 mg/dL BUN 36 (H) 6 - 20 MG/DL Creatinine 2.02 (H) 0.55 - 1.02 MG/DL  
 BUN/Creatinine ratio 18 12 - 20 GFR est AA 28 (L) >60 ml/min/1.73m2 GFR est non-AA 23 (L) >60 ml/min/1.73m2 Calcium 7.9 (L) 8.5 - 10.1 MG/DL  
CBC W/O DIFF Collection Time: 01/22/20  3:25 AM  
Result Value Ref Range WBC 6.7 3.6 - 11.0 K/uL  
 RBC 2.69 (L) 3.80 - 5.20 M/uL HGB 8.3 (L) 11.5 - 16.0 g/dL HCT 27.2 (L) 35.0 - 47.0 % .1 (H) 80.0 - 99.0 FL  
 MCH 30.9 26.0 - 34.0 PG  
 MCHC 30.5 30.0 - 36.5 g/dL  
 RDW 15.1 (H) 11.5 - 14.5 % PLATELET 745 721 - 437 K/uL MPV 11.2 8.9 - 12.9 FL  
 NRBC 0.0 0  WBC ABSOLUTE NRBC 0.00 0.00 - 0.01 K/uL MAGNESIUM Collection Time: 01/22/20  3:25 AM  
Result Value Ref Range Magnesium 2.0 1.6 - 2.4 mg/dL PHOSPHORUS Collection Time: 01/22/20  3:25 AM  
Result Value Ref Range Phosphorus 2.6 2.6 - 4.7 MG/DL  
GLUCOSE, POC Collection Time: 01/22/20  6:44 AM  
Result Value Ref Range Glucose (POC) 90 65 - 100 mg/dL Performed by Ramírez Robertson MD 
[address] [place] [address] [facility] Phone - [phone] Fax - [phone] 
[email or web address]. [email or web address]

## 2020-01-22 NOTE — ROUTINE PROCESS
0730: Bedside and Verbal shift change report given to Sandra Manuel RN (oncoming nurse) by Felix Contreras RN (offgoing nurse). Report included the following information SBAR, Kardex, ED Summary, Intake/Output, MAR, Recent Results, Med Rec Status, Cardiac Rhythm SR and Alarm Parameters . 1030: ICU multidisciplinary rounds lead by Dr. Vivienne Robin (Intensivist): The following were reviewed and discussed: current labs,  recent imaging, lines/drains, review of body systems, nutrition, cultures, mobility, length of ICU stay. The plan of care for the day is as follows: give lasix, remove branch, and do SBT but no plans to extubate due to neuro status. (written note by RN) 1930: Bedside and Verbal shift change report given to Felix Contreras RN (oncoming nurse) by Sandra Manuel RN (offgoing nurse). Report included the following information SBAR, Kardex, Intake/Output, MAR, Recent Results, Med Rec Status, Cardiac Rhythm SR and Alarm Parameters .

## 2020-01-22 NOTE — PROGRESS NOTES
[facility] 
 
ICU TEAM Progress Note Name: Jeremy Flores : 1928 MRN: [id number] Date: 2020 Subjective:  
Progress Note: 2020 CC: Altered mental status Reason for ICU Admission: Hypoglycemia Overnight Events: does not follow command, does not open eyes spontaneously POD:* No surgery found * S/P:  
 
Active Problem List:  
 
Problem List  Date Reviewed: 2017 Codes Class Acute respiratory failure (HCC) ICD-10-CM: J96.00 
ICD-9-CM: 518.81 Hypoglycemia ICD-10-CM: E16.2 ICD-9-CM: 251.2 Hypothermia ICD-10-CM: T68. Selinda Maeve ICD-9-CM: 991.6 Pneumonia ICD-10-CM: J18.9 ICD-9-CM: 048 Fall ICD-10-CM: W19. Dylan Mcfarlane ICD-9-CM: E888.9 Generalized weakness ICD-10-CM: R53.1 ICD-9-CM: 780.79 Laceration of head ICD-10-CM: S01. 91XA ICD-9-CM: 873.8 Parkinson's disease dementia ([facility] [address] [facility] ICD-10-CM: G20, F02.80 ICD-9-CM: 332.0, 294.10 Pre-syncope ICD-10-CM: R55 
ICD-9-CM: 780.2 Renal cell carcinoma (HCC) ICD-10-CM: C64.9 ICD-9-CM: 189.0 Overview Signed 7/3/2014  3:30 PM by Chantel Mcdermott MD  
  S/p Left partial nephrectomy, 3/2014, History of screening mammography (Chronic) ICD-10-CM: Z92.89 ICD-9-CM: V15.89 Overview Addendum 3/12/2013  1:25 PM by Chantel Mcdermott MD  
  3/12/13, [place], [facility] ICD-10-CM: G20 
ICD-9-CM: 332.0 Overview Addendum 7/3/2014  3:31 PM by Chantel Mcdermott MD  
  Advanced disease, Dr. Ruthann Dailey HTN (hypertension) ICD-10-CM: I10 
ICD-9-CM: 401.9 Hyperlipemia ICD-10-CM: E78.5 ICD-9-CM: 272.4 S/P TKR (total knee replacement) ICD-10-CM: I34.435 ICD-9-CM: V43.65 Overview Addendum 2012  8:16 PM by Chantel Mcdermott MD  
  bilateral , , Dr. Kyra Fitzgerald S/P radioactive iodine thyroid ablation ICD-10-CM: Z92.3 ICD-9-CM: V45.89 Overview Signed 2012  8:15 PM by Chantel Mcdermott MD  
  8276 S/P NINI-BSO ICD-10-CM: Z90.710, Z90.722, Z90.79 ICD-9-CM: V88.01, V45.77 Environmental allergies ICD-10-CM: Z91.09 
ICD-9-CM: V15.09 Anemia ICD-10-CM: D64.9 ICD-9-CM: 285.9 Past Medical History:  
 
 has a past medical history of Anemia, Arthritis, Chronic kidney disease, Dementia with Lewy bodies ([facility] (03/01/2017), Environmental allergies, HTN (hypertension) (1/19/2012), Hyperlipemia (1/19/2012), Hypertension, Other ill-defined conditions(799.89), Parkinson disease ([facility] (1/19/2012), S/P radioactive iodine thyroid ablation (1/19/2012), S/P NINI-BSO (1/19/2012), S/P TKR (total knee replacement) (1/19/2012), Thyroid disease, and Unspecified adverse effect of anesthesia. Past Surgical History:  
 
 has a past surgical history that includes hx nini and bso (1946); endoscopy, colon, diagnostic (7/2011); hx gi; and hx orthopaedic. Home Medications:  
 
Prior to Admission medications Medication Sig Start Date End Date Taking? Authorizing Provider  
carvedilol (COREG) 6.25 mg tablet Take 1 Tab by mouth two (2) times daily (with meals). 1/8/20   Sarah Beth Burroughs MD  
hydrALAZINE (APRESOLINE) 100 mg tablet Take 1 Tab by mouth three (3) times daily. 1/8/20   Sarah Beth Burroughs MD  
amLODIPine (NORVASC) 10 mg tablet Take 1 Tab by mouth daily. 1/9/20   Sarah Beth Burroughs MD  
epoetin jones-epbx (RETACRIT) 10,000 unit/mL injection 2 mL by SubCUTAneous route every seven (7) days. Indications: anemia due to kidney failure 1/13/20   Sarah Beth Burroughs MD  
furosemide (LASIX) 40 mg tablet Three times a week Mon, Wed and Friday 1/10/20   Sarah eBth Burroughs MD  
patiromer calcium sorbitex (VELTASSA) 8.4 gram powder Take 8.4 g by mouth every Monday, Wednesday, Friday. 1/8/20   Sarah Beth Burroughs MD  
losartan (COZAAR) 50 mg tablet Take 1 Tab by mouth daily. 1/9/20   Sarah Beth Burroughs MD  
benzonatate (TESSALON) 100 mg capsule Take 100 mg by mouth every eight (8) hours as needed for Cough.     Provider, Historical  
 guaiFENesin (ROBITUSSIN) 100 mg/5 mL liquid Take 200 mg by mouth every four (4) hours as needed for Cough. Provider, Historical  
polyethylene glycol (MIRALAX) 17 gram packet Take 17 g by mouth daily. Provider, Historical  
sodium polystyrene (KIONEX, WITH SORBITOL,) 15-19.3 gram/60 mL suspension Take 15 g by mouth now. Provider, Historical  
melatonin 3 mg tablet Take  by mouth. Provider, Historical  
L. acidoph & paracasei- S therm- Bifido (EH-Q/RISAQUAD) 8 billion cell cap cap Take 1 Cap by mouth daily. 11/9/18   Dami Parada MD  
ondansetron hcl (ZOFRAN) 4 mg tablet Take 4 mg by mouth every eight (8) hours as needed for Nausea. Provider, Historical  
OTHER Rollator Walker with seat 
icd 10 - G20 5/25/16   Nicole Kim MD  
lovastatin (MEVACOR) 20 mg tablet TAKE 1 TABLET BY MOUTH EVERY DAY 5/24/16   Nicole Kim MD  
docusate sodium (COLACE) 50 mg capsule Take 100 mg by mouth daily. Provider, Historical  
acetaminophen (TYLENOL) 500 mg tablet Take 500 mg by mouth every six (6) hours as needed for Pain. Provider, Historical  
carbidopa-levodopa (SINEMET)  mg per tablet Take 2 Tabs by mouth four (4) times daily. Provider, Historical  
multivitamin with iron tablet Take 1 Tab by mouth daily. [place] [facility]    Provider, Historical  
 
 
Allergies/Social/Family History: Allergies Allergen Reactions  Bactroban [Mupirocin Calcium] Other (comments) Skin peels  Betadine [Povidone-Iodine] Rash  Comtan [Entacapone] Diarrhea  Erythromycin Other (comments) GI upset  Penicillins Itching  Sulfa (Sulfonamide Antibiotics) Itching Social History Tobacco Use  Smoking status: Never Smoker  Smokeless tobacco: Never Used Substance Use Topics  Alcohol use: No  
  Alcohol/week: 0.0 standard drinks Family History Problem Relation Age of Onset  Coronary Artery Disease Father   
     s/p MI  
 Heart Disease Father  Elevated Lipids Father  Cancer Brother   
     hodgkins  Anesth Problems Neg Hx Review of Systems:  
 
unable to obtain due to patient condition Objective:  
Vital Signs: 
Visit Vitals /53 Pulse 66 Temp 97.4 °F (36.3 °C) Resp 12 Ht 5' 5\" (1.651 m) Wt 57.9 kg (127 lb 10.3 oz) SpO2 100% BMI 21.24 kg/m² O2 Device: Endotracheal tube, Ventilator Temp (24hrs), Av °F (36.7 °C), Min:97.4 °F (36.3 °C), Max:99.1 °F (37.3 °C) Intake/Output:  
 
Intake/Output Summary (Last 24 hours) at 2020 1055 Last data filed at 2020 1000 Gross per 24 hour Intake 3140 ml Output 665 ml Net 2475 ml Physical Exam: 
 
General:  appears stated age, open eyes does not follow commands Eye: conjunctivae/corneas clear PERRL Neurologic: open eyes spontaneously, does not track, does not move limbs spontaneously Neck:  neck supple and symmetrical.  Trachea midline Lungs:  clear to auscultation bilaterally, on mechanical ventilation Heart:  S1, S2 normal 
Abdomen:  soft, non-tender Skin:  Warm, dry, perfusing LABS AND  DATA: Personally reviewed Recent Labs  
  20 
0325 20 
0420 WBC 6.7 6.9 HGB 8.3* 7.9*  
HCT 27.2* 25.5*  
 167 Recent Labs  
  20 
0325 20 
0420  140  
K 4.6 4.4  
* 112* CO2 24 23 BUN 36* 36* CREA 2.02* 2.15* GLU 79 99  
CA 7.9* 7.5* MG 2.0 2.2 PHOS 2.6 2.5* No results for input(s): SGOT, GPT, AP, TBIL, TP, ALB, GLOB, AML, LPSE in the last 72 hours. No lab exists for component: AMYP No results for input(s): INR, PTP, APTT, INREXT, INREXT in the last 72 hours. Recent Labs  
  20 
0613 20 
1500 PHI 7.389 7.460* PCO2I 37.1 33.1*  
PO2I 41* 191* FIO2I 40 50 Recent Labs  
  20 
1134 * Hemodynamics:  
PAP:   CO:    
Wedge:   CI:    
CVP:    SVR:    
  PVR:    
 
Ventilator Settings: 
Mode Rate Tidal Volume Pressure FiO2 PEEP  
 Assist control, Volume control 15 375 ml  0 cm H2O 25 % 5 cm H20 Peak airway pressure: 24 cm H2O Minute ventilation: 6.98 l/min MEDS: Reviewed Chest X-Ray: CXR Results  (Last 48 hours) 01/21/20 0151  XR CHEST PORT Final result Impression:  IMPRESSION:  
Diminished pleural effusion and atelectasis on the left. Narrative:  PORTABLE CHEST RADIOGRAPH/S: 1/21/2020 1:51 AM  
   
Clinical history: Left-sided mucous plug INDICATION:   left side mucous plug, consolidation, re-evaluation COMPARISON: 1/19/2020 FINDINGS:  
AP portable upright view of the chest demonstrates a stable enlarged  
cardiopericardial silhouette. The lungs are adequately expanded. Left basilar  
atelectasis and effusion is diminished compared to the prior examination. ET  
tube and NG tube unchanged. Cord catheter on the right unchanged. The osseous  
structures are unremarkable. Patient is on a cardiac monitor. MRI BRAIN WO CONT Final Result IMPRESSION:  
Imaging findings suggest watershed ischemia/infarction right and left posterior  
frontal/anterior parietal lobes. There is no superimposed hemorrhage, midline  
shift or mass effect. Moderate to severe cerebral atrophy. Chronic microvascular ischemic change. XR CHEST PORT Final Result IMPRESSION:  
Diminished pleural effusion and atelectasis on the left. XR ABD PORT  1 V Final Result IMPRESSION: No acute process. NG tube satisfactory position. XR CHEST PORT Final Result IMPRESSION:   
  
Satisfactory height of the endotracheal tube. It is unclear whether this the  
endotracheal tube was pulled back, there is left main stem mucous plugging, or  
the ETT is really esophageal in location. Complete consolidation of the left lung. Ester Godinez RN was given this result at 1304 hours. XR ABD (KUB) Final Result IMPRESSION: NG tube tip overlying stomach CT HEAD WO CONT Final Result IMPRESSION: Small vessel ischemic white matter disease. Incidental bilateral  
maxillary sinus effusions. XR CHEST PORT Final Result IMPRESSION: Left basilar subsegmental atelectasis versus effusion. Low position  
of endotracheal tube. elevated EF of 45-50%, small pericardial effusion Assessment:  
 
ICU Problems: - Hypoglycemia 
- shock -  
- Hypokalemia 
- anemia - Acute encephalopathy 
- Ventilator dependent respiratory failure ICU Comprehensive Plan of Care:  
Plans for this Shift: 1. Fluids to LR 75cc/hr 2. Monitor mental status, patient continue to be unresponsive 3. SBT 4. Continue empiric abx for 7 days 5. Nephrology consulted for , likely ATN, will continue monitor I/O, improving renal function 6. Will need GOC discussion if continue to be encephalopathic without improvement palliative care consult 7. Check ammonia, glucose stable, BUN mildly elevated, AMS likely 2/2 ischemic/watershed infarct Multidisciplinary Rounds Completed: Yes ABCDEF Bundle/Checklist 
Pain Medications: None Target RASS: N/A Sedation Medications: None CAM-ICU:  unable to assess Mobility: Poor PT/OT: not ready Restraints: None needed at this time Discussed Plan of Care (goals of care): Yes Addressed Code Status: Full Code CARDIOVASCULAR Cardiac Gtts: None SBP Goal of: > 90 mmHg MAP Goal of: > 65 mmHg Transfusion Trigger (Hgb): <7 g/dL RESPIRATORY Vent Goals:  
Chlorhexidine Optimize PEEP/Ventilation/Oxygenation Aim for lung protective ventilation Head of bed > 30 degrees DVT Prophylaxis (if no, list reason): SCD's or Sequential Compression Device and Heparin SPO2 Goal: > 92% Pulmonary toilet: Duo-Nebs GI/ Trejo Catheter Present: Yes GI Prophylaxis: Pepcid (famotidine) Nutrition: Yes TF IVFs: [email or web address] Bowel Movement: No 
Bowel Regimen: None needed at this time Insulin: None, hypoglycemic ANTIBIOTICS Antibiotics: 
Cefepime T/L/D Tubes: ETT and Orogastric Tube Lines: Peripheral IV Drains: Trejo Catheter SPECIAL EQUIPMENT None DISPOSITION Stay in ICU CRITICAL CARE CONSULTANT NOTE I had a face to face encounter with the patient, reviewed and interpreted patient data including clinical events, labs, images, vital signs, I/O's, and examined patient. I have discussed the case and the plan and management of the patient's care with the consulting services, the bedside nurses and the respiratory therapist.   
 
NOTE OF PERSONAL INVOLVEMENT IN CARE This patient has a high probability of imminent, clinically significant deterioration, which requires the highest level of preparedness to intervene urgently. I participated in the decision-making and personally managed or directed the management of the following life and organ supporting interventions that required my frequent assessment to treat or prevent imminent deterioration. I personally spent 35 minutes of critical care time. This is time spent at this critically ill patient's bedside actively involved in patient care as well as the coordination of care and discussions with the patient's family. This does not include any procedural time which has been billed separately. Osiris Rosas MD 
Staff RENU/ Rain 62 
1/22/2020

## 2020-01-22 NOTE — PROGRESS NOTES
1930: Bedside and Verbal shift change report given to Danny Hess (oncoming nurse) by Ezra Collins 72. (offgoing nurse). Report included the following information SBAR, Kardex, ED Summary, Intake/Output, MAR, Recent Results, and Cardiac Rhythm AIVR . 0000: Pt went to MRI. 0130: Returned from MRI. 
 
0500: Pt with BP in 160s. Notified NP Hannah and orders for hydralazine received.

## 2020-01-23 NOTE — CONSULTS
Consult dictated. 69-year-old with Parkinson's who was lethargic for 2 to 3 days prior to admission and then admitted in unresponsive state with severe hypoglycemia and bradycardia. Neurologically she has failed to improve and MRI done yesterday shows extensive areas of watershed distribution infarction in both hemispheres. Likely related to severe hypotension and bradycardia. Clinically she opens her eyes, withdraws lower extremities to pain but no movement seen in the arms. Bilateral watershed zone infarction can cause bilateral paralysis, visual deficits, seizures and cognitive deficits. Prognosis is guarded to poor especially given her age and underlying comorbidities. Will discuss with family. Check EEG deferring any further stroke work-up for now.   
Celia Liu MD

## 2020-01-23 NOTE — PROGRESS NOTES
Transitions of Care TBD pending EEG results MRI: extensive areas of watershed distribution infarction in both hemispheres Per MD notes patient's prognosis is guarded to poor. Family contemplating transitioning to comfort care. Care management continuing to follow. Marisol Pacheco RN,CRM

## 2020-01-23 NOTE — PROGRESS NOTES
Nephrology Progress Note Jeremy Flores Date of Admission : 1/19/2020 CC: Follow up for  on CKD Assessment and Plan  on CKD: 
- likely 2/2 ATN 
- Cr stable and at baseline now 
- cont current care 
- lasix 40mg IV x 1 this AM 
- daily labs 
  
Hypokalemia: 
- resolved 
  
Hypoglycemia: 
- per primary service 
  
Leukocytosis: 
- cultures pending, on abx - WBC improving 
  
CKD III: 
- baseline Cr around 2 prior to admission 
- from DM, HTN and reduced renal mass 
  
Hx of RCC s/p partial L nephrectomy 
  
HTN: 
-Continue current medications 
  
Chronic Anemia: 
- start IDA 
  
Recent LLL Pneumonia w/ effusion 
  
DM2: 
- on insulin 
  
Dementia 
  
Parkinon's Poor prognosis overall. Discussed with family at bedside. Interval History: 
Seen and examined. Cr stable. UOP ok w/ lasix yesterday. No changes in MS. Off sedation. BP stable. Current Medications: all current  Medications have been eviewed in [facility] Review of Systems: Pertinent items are noted in HPI. Objective: 
Vitals:   
Vitals:  
 01/23/20 0600 01/23/20 0700 01/23/20 0800 01/23/20 7389 BP: 160/64 165/67 137/57 Pulse: 70 71 72 Resp: 15 18 16 19 Temp:   98.4 °F (36.9 °C) SpO2: 100% 100% 99% Weight:      
Height:      
 
Intake and Output: 
01/23 0701 - 01/23 1900 In: 230 Out: 100 [Urine:100] 01/21 1901 - 01/23 0700 In: 9804 [I.V.:1300] Out: 2350 [DBSXF:3354] Physical Examination: 
Pt intubated    Yes General:          unresponsive Neck:  Supple, no mass Resp:  Coarse breath sounds b/l CV:  RRR,  no murmur or rub, no LE edema GI:  Soft, NT, + Bowel sounds, no hepatosplenomegaly Neurologic:  unresponsive Psych:             Unable to assess Skin:  No Rash :  Trejo in place 
 
[]    High complexity decision making was performed 
[]    Patient is at high-risk of decompensation with multiple organ involvement Lab Data Personally Reviewed: I have reviewed all the pertinent labs, microbiology data and radiology studies during assessment. Recent Labs  
  01/23/20 
0416 01/22/20 
0325 01/21/20 
0420  140 140  
K 4.8 4.6 4.4  
 111* 112* CO2 25 24 23 GLU 83 79 99 BUN 42* 36* 36* CREA 2.02* 2.02* 2.15* CA 8.2* 7.9* 7.5* MG 2.0 2.0 2.2 PHOS 3.3 2.6 2.5* Recent Labs  
  01/23/20 
0416 01/22/20 
0325 01/21/20 
0420 WBC 5.9 6.7 6.9 HGB 8.1* 8.3* 7.9*  
HCT 25.8* 27.2* 25.5*  
* 157 167 Lab Results Component Value Date/Time Specimen Description: CLEAN [facility] 02/19/2014 11:12 AM  
 Specimen Description: STOOL 03/19/2009 10:03 AM  
 
Lab Results Component Value Date/Time Culture result: HEAVY NORMAL RESPIRATORY EH 01/19/2020 02:28 PM  
 Culture result: NO GROWTH 3 DAYS 01/19/2020 08:20 AM  
 Culture result: KLEBSIELLA PNEUMONIAE (A) 12/31/2019 11:43 PM  
 
Recent Results (from the past 24 hour(s)) GLUCOSE, POC Collection Time: 01/22/20 11:21 AM  
Result Value Ref Range Glucose (POC) 96 65 - 100 mg/dL Performed by Dhir Diamonds Zayra AMMONIA Collection Time: 01/22/20  1:45 PM  
Result Value Ref Range Ammonia 14 <32 UMOL/L  
GLUCOSE, POC Collection Time: 01/22/20  5:36 PM  
Result Value Ref Range Glucose (POC) 95 65 - 100 mg/dL Performed by Dhir Diamonds Zayra GLUCOSE, POC Collection Time: 01/22/20 11:52 PM  
Result Value Ref Range Glucose (POC) 84 65 - 100 mg/dL Performed by [facility] METABOLIC PANEL, BASIC Collection Time: 01/23/20  4:16 AM  
Result Value Ref Range Sodium 139 136 - 145 mmol/L Potassium 4.8 3.5 - 5.1 mmol/L Chloride 108 97 - 108 mmol/L  
 CO2 25 21 - 32 mmol/L Anion gap 6 5 - 15 mmol/L Glucose 83 65 - 100 mg/dL BUN 42 (H) 6 - 20 MG/DL Creatinine 2.02 (H) 0.55 - 1.02 MG/DL  
 BUN/Creatinine ratio 21 (H) 12 - 20 GFR est AA 28 (L) >60 ml/min/1.73m2 GFR est non-AA 23 (L) >60 ml/min/1.73m2  Calcium 8.2 (L) 8.5 - 10.1 MG/DL  
CBC W/O DIFF  
 Collection Time: 01/23/20  4:16 AM  
Result Value Ref Range WBC 5.9 3.6 - 11.0 K/uL  
 RBC 2.62 (L) 3.80 - 5.20 M/uL HGB 8.1 (L) 11.5 - 16.0 g/dL HCT 25.8 (L) 35.0 - 47.0 % MCV 98.5 80.0 - 99.0 FL  
 MCH 30.9 26.0 - 34.0 PG  
 MCHC 31.4 30.0 - 36.5 g/dL  
 RDW 15.1 (H) 11.5 - 14.5 % PLATELET 477 (L) 431 - 400 K/uL MPV 10.8 8.9 - 12.9 FL  
 NRBC 0.0 0  WBC ABSOLUTE NRBC 0.00 0.00 - 0.01 K/uL MAGNESIUM Collection Time: 01/23/20  4:16 AM  
Result Value Ref Range Magnesium 2.0 1.6 - 2.4 mg/dL PHOSPHORUS Collection Time: 01/23/20  4:16 AM  
Result Value Ref Range Phosphorus 3.3 2.6 - 4.7 MG/DL  
GLUCOSE, POC Collection Time: 01/23/20  5:39 AM  
Result Value Ref Range Glucose (POC) 77 65 - 100 mg/dL Performed by Shannon Scott GLUCOSE, POC Collection Time: 01/23/20  6:13 AM  
Result Value Ref Range Glucose (POC) 77 65 - 100 mg/dL Performed by Shannon Scott GLUCOSE, POC Collection Time: 01/23/20  6:43 AM  
Result Value Ref Range Glucose (POC) 98 65 - 100 mg/dL Performed by Shannon Stack MD 
[facility]  
[address], [address] [facility] Phone - [phone] Fax - [phone] 
[email or web address]

## 2020-01-23 NOTE — PROGRESS NOTES
Bedside and Verbal shift change report given to [address], RN (oncoming nurse) by Kush Padilla RN (offgoing nurse). Report included the following information SBAR, Kardex, Intake/Output, MAR, Recent Results, Cardiac Rhythm NSR W/ 1 degree AVB, Alarm Parameters  and Dual Neuro Assessment. 0800 VSS. Patient w/draws RLE and LLE but is otherwise unresponsive. Port a cath access w/KVO. Intubated w/no sedation. 0830 Dr. Mercedes Garay @ bedside to speak with family. MD orders to consult neurology. 0930 Dr. Margaret Maria @ bedside to see patient. No family present @ this time. MD requests to be notified when family returns. 56 Dr. Margaret Maria @bedside speaking with family. MD orders given for EEG.   
 
1145 EEG technician at bedside preparing patient for test.

## 2020-01-23 NOTE — PROGRESS NOTES
1930: Bedside and Verbal shift change report given to [address] (oncoming nurse) by Adam Hawkins RN (offgoing nurse). Report included the following information SBAR, Kardex, ED Summary, Intake/Output, Accordion, and Cardiac Rhythm NSR . Shift summary: Pt does not follow commands. She withdraws in all extremities. All extremities contracted. Pupils sluggish round, reactive. Pt with right port, ETT, OGT, pure wick. Primary Nurse Bo Grider RN and Rosa Walker RN performed a dual skin assessment on this patient Impairment noted- see wound doc flow sheet Chester score is 11

## 2020-01-23 NOTE — CONSULTS
[place] Name:  Barrington Mae 
MR#:  [id number] :  1928 ACCOUNT #:  [de-identified] DATE OF SERVICE:  2020 REQUESTING PHYSICIAN:  Dr. Darien Denney:  Stroke. HISTORY OF PRESENT ILLNESS:  The patient is a 71-year-old female with history of dementia, Parkinson's disease, hypertension, who was recently treated for pneumonia and was discharged to nursing home. Apparently, before coming to the hospital, she was lethargic and poorly responsive for a couple of days. Then, she eventually became difficult to arouse and was sent back to the hospital.  She was found to have a blood sugar of 16 in the emergency department and she was intubated for airway protection. She has been treated for acute respiratory failure, hypothermia, and is currently on antibiotics for pneumonia. Neurologically, she has failed to improve and barely opens her eyes to stimulation and no movement seen in the extremities. MRI was completed which showed extensive areas of infarction in watershed zones of both cerebral hemispheres. Neurology was asked to evaluate. PAST MEDICAL HISTORY:  As mentioned above. CURRENT MEDICATIONS: 
1. Carbidopa/levodopa 25/100 two tablets four times daily. 2.  Cefepime. 3.  Retacrit. 4.  Lovastatin. ALLERGIES:  BACTROBAN, BETADINE, COMTAN, ERYTHROMYCIN, PENICILLIN. SOCIAL HISTORY:  No history of smoking or alcohol use. FAMILY HISTORY:  Noncontributory. REVIEW OF SYSTEMS:  Difficult to obtain. PHYSICAL EXAMINATION: 
GENERAL:  The patient is alert, fully oriented. VITAL SIGNS:  Blood pressure 144/55, temperature 98.4, pulse is 72. NEUROLOGIC:  Pupils are equal and reactive. She does not open her eyes to verbal command, but to painful stimulation, she opens them. Did not follow any commands.   She quickly withdraws her legs to painful stimulation, but I did not notice any significant movement in the arms bilaterally. Sensory, cerebellar, and gait exam was deferred. HEART:  Regular rate and rhythm. CHEST:  Clear. ABDOMEN:  Soft. Positive bowel sounds. EXTREMITIES:  No edema. LABORATORY AND DIAGNOSTIC DATA:  CBC with WBC 5.9, hemoglobin 8.1, hematocrit is 25.8, platelets 867. Chemistry, sodium 139, potassium 4.8, BUN 42, creatinine 2.02.  . MRI scan, brain images were independently reviewed and findings as discussed above. There is underlying moderate-to-severe cerebral atrophy and chronic microvascular ischemic change. Echocardiogram shows an ejection fraction of 45% to 50%, dilated left atrium. ASSESSMENT AND PLAN:  A 79-year-old female with history of Parkinson's disease who was lethargic for two to three days prior to admission and then admitted in unresponsive state with severe hypoglycemia, bradycardia and respiratory failure. Neurologically, she has failed to improve. 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 These were likely related to his severe hypotension and bradycardia. It is possible that she has underlying carotid stenoses. Clinically, she opens her eyes, withdraws her lower extremities to pain but no movement seen in the arms. Bilateral watershed zone infarctions can cause bilateral paralysis, visual deficits, seizures, and cognitive impairment. Prognosis is guarded to poor, especially given her age and underlying comorbidities. We will discuss with family. We will check EEG and deferring any further stroke workup for now. Thank you for this consultation. MD LEYLA [email or web address] 
D:  01/23/2020 10:22 
T:  01/23/2020 13:38 
JOB #:  [id number]

## 2020-01-23 NOTE — PROGRESS NOTES
[facility] 
 
ICU TEAM Progress Note Name: Kenton Jacques : 1928 MRN: [id number] Date: 2020 Subjective:  
Progress Note: 2020 CC: Altered mental status Reason for ICU Admission: Hypoglycemia Overnight Events: does not follow command, move LE spontaneously POD:* No surgery found * S/P:  
 
Active Problem List:  
 
Problem List  Date Reviewed: 2017 Codes Class Acute respiratory failure (HCC) ICD-10-CM: J96.00 
ICD-9-CM: 518.81 Hypoglycemia ICD-10-CM: E16.2 ICD-9-CM: 251.2 Hypothermia ICD-10-CM: T68. Ana [place] ICD-9-CM: 991.6 Pneumonia ICD-10-CM: J18.9 ICD-9-CM: 050 Fall ICD-10-CM: W19. Jeffrey Bess ICD-9-CM: E888.9 Generalized weakness ICD-10-CM: R53.1 ICD-9-CM: 780.79 Laceration of head ICD-10-CM: S01. 91XA ICD-9-CM: 873.8 Parkinson's disease dementia ([facility] ICD-10-CM: G20, F02.80 ICD-9-CM: 332.0, 294.10 Pre-syncope ICD-10-CM: R55 
ICD-9-CM: 780.2 Renal cell carcinoma (HCC) ICD-10-CM: C64.9 ICD-9-CM: 189.0 Overview Signed 7/3/2014  3:30 PM by Soniya Merchant MD  
  S/p Left partial nephrectomy, 3/2014, History of screening mammography (Chronic) ICD-10-CM: Z92.89 ICD-9-CM: V15.89 Overview Addendum 3/12/2013  1:25 PM by Soniya Merchant MD  
  3/12/13, normal, [email or web address] Parkinson disease ([facility] ICD-10-CM: G20 
ICD-9-CM: 332.0 Overview Addendum 7/3/2014  3:31 PM by Soniya Merchant MD  
  Advanced disease, Dr. Fatimah Mcduffie HTN (hypertension) ICD-10-CM: I10 
ICD-9-CM: 401.9 Hyperlipemia ICD-10-CM: E78.5 ICD-9-CM: 272.4 S/P TKR (total knee replacement) ICD-10-CM: P11.687 ICD-9-CM: V43.65 Overview Addendum 2012  8:16 PM by Soniya Merchant MD  
  bilateral , , Dr. Satish Baker S/P radioactive iodine thyroid ablation ICD-10-CM: Z92.3 ICD-9-CM: V45.89 Overview Signed 2012  8:15 PM by Soniya Merchant MD  
  4516 S/P NINI-BSO ICD-10-CM: Z90.710, Z90.722, Z90.79 ICD-9-CM: V88.01, V45.77 Environmental allergies ICD-10-CM: Z91.09 
ICD-9-CM: V15.09 Anemia ICD-10-CM: D64.9 ICD-9-CM: 285.9 Past Medical History:  
 
 has a past medical history of Anemia, Arthritis, Chronic kidney disease, Dementia with Lewy bodies ([facility] (03/01/2017), Environmental allergies, HTN (hypertension) (1/19/2012), Hyperlipemia (1/19/2012), Hypertension, Other ill-defined conditions(799.89), Parkinson disease ([facility] (1/19/2012), S/P radioactive iodine thyroid ablation (1/19/2012), S/P NINI-BSO (1/19/2012), S/P TKR (total knee replacement) (1/19/2012), Thyroid disease, and Unspecified adverse effect of anesthesia. Past Surgical History:  
 
 has a past surgical history that includes hx nini and bso (1946); endoscopy, colon, diagnostic (7/2011); hx gi; and hx orthopaedic. Home Medications:  
 
Prior to Admission medications Medication Sig Start Date End Date Taking? Authorizing Provider  
carvedilol (COREG) 6.25 mg tablet Take 1 Tab by mouth two (2) times daily (with meals). 1/8/20   Shon Santiago MD  
hydrALAZINE (APRESOLINE) 100 mg tablet Take 1 Tab by mouth three (3) times daily. 1/8/20   Shon Santiago MD  
amLODIPine (NORVASC) 10 mg tablet Take 1 Tab by mouth daily. 1/9/20   Shon Santiago MD  
epoetin jones-epbx (RETACRIT) 10,000 unit/mL injection 2 mL by SubCUTAneous route every seven (7) days. Indications: anemia due to kidney failure 1/13/20   Shon Santiago MD  
furosemide (LASIX) 40 mg tablet Three times a week Mon, Wed and Friday 1/10/20   Shon Santiago MD  
patiromer calcium sorbitex (VELTASSA) 8.4 gram powder Take 8.4 g by mouth every Monday, Wednesday, Friday. 1/8/20   Shon Santiago MD  
losartan (COZAAR) 50 mg tablet Take 1 Tab by mouth daily. 1/9/20   Shon Santiago MD  
benzonatate (TESSALON) 100 mg capsule Take 100 mg by mouth every eight (8) hours as needed for Cough.     Provider, Historical  
 guaiFENesin (ROBITUSSIN) 100 mg/5 mL liquid Take 200 mg by mouth every four (4) hours as needed for Cough. Provider, Historical  
polyethylene glycol (MIRALAX) 17 gram packet Take 17 g by mouth daily. Provider, Historical  
sodium polystyrene (KIONEX, WITH SORBITOL,) 15-19.3 gram/60 mL suspension Take 15 g by mouth now. Provider, Historical  
melatonin 3 mg tablet Take  by mouth. Provider, Historical  
L. acidoph & paracasei- S therm- Bifido (EH-Q/RISAQUAD) 8 billion cell cap cap Take 1 Cap by mouth daily. 11/9/18   Anna Kaur MD  
ondansetron hcl (ZOFRAN) 4 mg tablet Take 4 mg by mouth every eight (8) hours as needed for Nausea. Provider, Historical  
OTHER Rollator Walker with seat 
icd 10 - G20 5/25/16   Marvin Doss MD  
lovastatin (MEVACOR) 20 mg tablet TAKE 1 TABLET BY MOUTH EVERY DAY 5/24/16   Marvin Doss MD  
docusate sodium (COLACE) 50 mg capsule Take 100 mg by mouth daily. Provider, Historical  
acetaminophen (TYLENOL) 500 mg tablet Take 500 mg by mouth every six (6) hours as needed for Pain. Provider, Historical  
carbidopa-levodopa (SINEMET)  mg per tablet Take 2 Tabs by mouth four (4) times daily. Provider, Historical  
multivitamin with iron tablet Take 1 Tab by mouth daily. [employer] [facility]    Provider, Historical  
 
 
Allergies/Social/Family History: Allergies Allergen Reactions  Bactroban [Mupirocin Calcium] Other (comments) Skin peels  Betadine [Povidone-Iodine] Rash  Comtan [Entacapone] Diarrhea  Erythromycin Other (comments) GI upset  Penicillins Itching  Sulfa (Sulfonamide Antibiotics) Itching Social History Tobacco Use  Smoking status: Never Smoker  Smokeless tobacco: Never Used Substance Use Topics  Alcohol use: No  
  Alcohol/week: 0.0 standard drinks Family History Problem Relation Age of Onset  Coronary Artery Disease Father   
     s/p MI  
 Heart Disease Father  Elevated Lipids Father  Cancer Brother   
     hodgkins  Anesth Problems Neg Hx Review of Systems:  
 
unable to obtain due to patient condition Objective:  
Vital Signs: 
Visit Vitals /55 Pulse 72 Temp 98.4 °F (36.9 °C) Resp 19 Ht 5' 5\" (1.651 m) Wt 60.9 kg (134 lb 4.2 oz) SpO2 99% BMI 22.34 kg/m² O2 Device: Endotracheal tube Temp (24hrs), Av.7 °F (36.5 °C), Min:96.9 °F (36.1 °C), Max:98.4 °F (36.9 °C) Intake/Output:  
 
Intake/Output Summary (Last 24 hours) at 2020 1806 Last data filed at 2020 0800 Gross per 24 hour Intake 1825 ml Output 1425 ml Net 400 ml Physical Exam: 
 
General:  appears stated age, open eyes does not follow commands Eye: conjunctivae/corneas clear PERRL Neurologic: withdraws to pain, does not follow commands, does not open eyes spontaneously Neck:  neck supple and symmetrical.  Trachea midline Lungs:  clear to auscultation bilaterally, on mechanical ventilation Heart:  S1, S2 normal 
Abdomen:  soft, non-tender Skin:  Warm, dry, perfusing LABS AND  DATA: Personally reviewed Recent Labs  
  20 
0416 20 
0325 WBC 5.9 6.7 HGB 8.1* 8.3* HCT 25.8* 27.2*  
* 157 Recent Labs  
  20 
0416 20 
0325  140  
K 4.8 4.6  111* CO2 25 24 BUN 42* 36* CREA 2.02* 2.02* GLU 83 79 CA 8.2* 7.9*  
MG 2.0 2.0 PHOS 3.3 2.6 No results for input(s): SGOT, GPT, AP, TBIL, TP, ALB, GLOB, AML, LPSE in the last 72 hours. No lab exists for component: AMYP No results for input(s): INR, PTP, APTT, INREXT, INREXT in the last 72 hours. No results for input(s): PHI, PCO2I, PO2I, FIO2I in the last 72 hours. Recent Labs  
  20 
1134 * Hemodynamics:  
PAP:   CO:    
Wedge:   CI:    
CVP:    SVR:    
  PVR:    
 
Ventilator Settings: 
Mode Rate Tidal Volume Pressure FiO2 PEEP Assist control 15 375 ml  0 cm H2O 25 % 5 cm H20  
 
 Peak airway pressure: 11 cm H2O Minute ventilation: 8.16 l/min MEDS: Reviewed Chest X-Ray: CXR Results  (Last 48 hours) None MRI BRAIN WO CONT Final Result IMPRESSION:  
Imaging findings suggest watershed ischemia/infarction right and left posterior  
frontal/anterior parietal lobes. There is no superimposed hemorrhage, midline  
shift or mass effect. Moderate to severe cerebral atrophy. Chronic microvascular ischemic change. XR CHEST PORT Final Result IMPRESSION:  
Diminished pleural effusion and atelectasis on the left. XR ABD PORT  1 V Final Result IMPRESSION: No acute process. NG tube satisfactory position. XR CHEST PORT Final Result IMPRESSION:   
  
Satisfactory height of the endotracheal tube. It is unclear whether this the  
endotracheal tube was pulled back, there is left main stem mucous plugging, or  
the ETT is really esophageal in location. Complete consolidation of the left lung. Liliya Schmidt RN was given this result at 1304 hours. XR ABD (KUB) Final Result IMPRESSION: NG tube tip overlying stomach CT HEAD WO CONT Final Result IMPRESSION: Small vessel ischemic white matter disease. Incidental bilateral  
maxillary sinus effusions. XR CHEST PORT Final Result IMPRESSION: Left basilar subsegmental atelectasis versus effusion. Low position  
of endotracheal tube. elevated EF of 45-50%, small pericardial effusion Assessment:  
 
ICU Problems: - Hypoglycemia 
- shock -  
- Hypokalemia 
- anemia - Acute encephalopathy 
- Ventilator dependent respiratory failure ICU Comprehensive Plan of Care:  
Plans for this Shift: 1. Fluids to LR 75cc/hr 2. Monitor mental status, patient continue to be unresponsive 3. Neurology consult/ possible EEG 4. SBT 5. Continue empiric abx for 7 days 6.  Nephrology consulted for , likely ATN, will continue monitor I/O, improving renal function 7. Discussed with daughter and daughter in law, patient would not wish to be on chronic mechanical ventilation, will discuss w/ neurology regarding additional testing, GOC discussion in 1-2 days, if no improvement, will likely move towards comfort measures 8. Ammonia normal, glucose stable, BUN mildly elevated, AMS likely 2/2 ischemic/watershed infarct Multidisciplinary Rounds Completed: Yes ABCDEF Bundle/Checklist 
Pain Medications: None Target RASS: N/A Sedation Medications: None CAM-ICU:  unable to assess Mobility: Poor PT/OT: not ready Restraints: None needed at this time Discussed Plan of Care (goals of care): Yes Addressed Code Status: Full Code CARDIOVASCULAR Cardiac Gtts: None SBP Goal of: > 90 mmHg MAP Goal of: > 65 mmHg Transfusion Trigger (Hgb): <7 g/dL RESPIRATORY Vent Goals:  
Chlorhexidine Optimize PEEP/Ventilation/Oxygenation Aim for lung protective ventilation Head of bed > 30 degrees DVT Prophylaxis (if no, list reason): SCD's or Sequential Compression Device and Heparin SPO2 Goal: > 92% Pulmonary toilet: Duo-Nebs GI/ Trejo Catheter Present: Yes GI Prophylaxis: Pepcid (famotidine) Nutrition: Yes TF IVFs: [email or web address] Bowel Movement: No 
Bowel Regimen: None needed at this time Insulin: None, hypoglycemic ANTIBIOTICS Antibiotics: 
Cefepime T/L/D Tubes: ETT and Orogastric Tube Lines: Peripheral IV Drains: Trejo Catheter SPECIAL EQUIPMENT None DISPOSITION Stay in ICU CRITICAL CARE CONSULTANT NOTE I had a face to face encounter with the patient, reviewed and interpreted patient data including clinical events, labs, images, vital signs, I/O's, and examined patient.   I have discussed the case and the plan and management of the patient's care with the consulting services, the bedside nurses and the respiratory therapist.   
 
NOTE OF PERSONAL INVOLVEMENT IN CARE  
 This patient has a high probability of imminent, clinically significant deterioration, which requires the highest level of preparedness to intervene urgently. I participated in the decision-making and personally managed or directed the management of the following life and organ supporting interventions that required my frequent assessment to treat or prevent imminent deterioration. I personally spent 35 minutes of critical care time. This is time spent at this critically ill patient's bedside actively involved in patient care as well as the coordination of care and discussions with the patient's family. This does not include any procedural time which has been billed separately. Beth Mccoy MD 
Staff RENU/ Rain 62 1/23/2020

## 2020-01-23 NOTE — PROCEDURES
[address]  
EEG Name:  Aleja Leigh 
MR#:  [id number] :  1928 ACCOUNT #:  [de-identified] DATE OF SERVICE:  2020 REQUESTING PHYSICIAN:  Benita Oliveira MD 
 
HISTORY:  The patient is a 26-year-old female who is being evaluated for altered mental status, and brain imaging showing bihemispheric infarctions. DESCRIPTION:  This is an 18-channel EEG performed on a nonresponsive patient. There is no clear dominant background rhythm. Background activity consists of low to medium voltage rhythms in the 3-4 Hz frequency range out of the posterior head region. There were frontally dominant medium to high voltage triphasic-appearing waveforms seen throughout the recording. Drowsiness and sleep architecture was not noted. Stimulation of the patient did not produce any clear reactivity. Photic stimulation did not elicit a driving response. EEG SUMMARY:  Abnormal EEG due to severe slowing of the background rhythms. CLINICAL INTERPRETATION:  This EEG is suggestive of a severe generalized encephalopathic process, nonspecific in type. This is likely related to underlying structural brain injury. No convincingly lateralizing or epileptiform features were noted. No seizure was recorded. Liana Benson MD 
 
 
[id number] 
D:  2020 13:57 
T:  2020 15:19 
JOB #:  [id number]

## 2020-01-24 NOTE — PROGRESS NOTES
Problem: Ventilator Management Goal: *Adequate oxygenation and ventilation Outcome: Progressing Towards Goal 
Goal: *Patient maintains clear airway/free of aspiration Outcome: Progressing Towards Goal 
Goal: *Absence of infection signs and symptoms Outcome: Progressing Towards Goal 
Goal: *Normal spontaneous ventilation Outcome: Progressing Towards Goal 
  
Problem: Patient Education: Go to Patient Education Activity Goal: Patient/Family Education Outcome: Progressing Towards Goal 
  
Problem: Falls - Risk of 
Goal: *Absence of Falls Description Document Tia Manus Fall Risk and appropriate interventions in the flowsheet. Outcome: Progressing Towards Goal 
Note: Fall Risk Interventions: 
  
 
  
 
Medication Interventions: Evaluate medications/consider consulting pharmacy Elimination Interventions: Patient to call for help with toileting needs Problem: Patient Education: Go to Patient Education Activity Goal: Patient/Family Education Outcome: Progressing Towards Goal 
  
Problem: Pressure Injury - Risk of 
Goal: *Prevention of pressure injury Description Document Chester Scale and appropriate interventions in the flowsheet. Outcome: Progressing Towards Goal 
Note: Pressure Injury Interventions: 
Sensory Interventions: Assess changes in LOC Moisture Interventions: Absorbent underpads, Internal/External urinary devices Activity Interventions: Pressure redistribution bed/mattress(bed type) Mobility Interventions: Pressure redistribution bed/mattress (bed type) Nutrition Interventions: Document food/fluid/supplement intake Friction and Shear Interventions: HOB 30 degrees or less, Lift sheet Problem: Patient Education: Go to Patient Education Activity Goal: Patient/Family Education Outcome: Progressing Towards Goal 
  
Problem: Discharge Planning Goal: *Discharge to safe environment Outcome: Progressing Towards Goal 
  
Problem: Nutrition Deficit Goal: *Optimize nutritional status Outcome: Progressing Towards Goal 
  
Problem: Impaired Skin Integrity/Pressure Injury Treatment Goal: *Improvement of Existing Pressure Injury Outcome: Progressing Towards Goal 
Goal: *Prevention of pressure injury Description Document Chester Scale and appropriate interventions in the flowsheet. Outcome: Progressing Towards Goal 
Note: Pressure Injury Interventions: 
Sensory Interventions: Assess changes in LOC Moisture Interventions: Absorbent underpads, Internal/External urinary devices Activity Interventions: Pressure redistribution bed/mattress(bed type) Mobility Interventions: Pressure redistribution bed/mattress (bed type) Nutrition Interventions: Document food/fluid/supplement intake Friction and Shear Interventions: HOB 30 degrees or less, Lift sheet Problem: Patient Education: Go to Patient Education Activity Goal: Patient/Family Education Outcome: Progressing Towards Goal

## 2020-01-24 NOTE — PROGRESS NOTES
Spiritual Care Assessment/Progress Note [address] 
 
 
NAME: Vanessa Minaya      MRN: [id number] AGE: 80 y.o. SEX: female Hindu Affiliation: Quaker  
Language: English  
 
1/24/2020 Spiritual Assessment begun in [facility] through conversation with: 
  
    []Patient        [x] Family    [] Friend(s) Reason for Consult: Family care Spiritual beliefs: (Please include comment if needed) [x] Identifies with a rory tradition:     
   [] Supported by a rory community:        
   [] Claims no spiritual orientation:       
   [] Seeking spiritual identity:            
   [] Adheres to an individual form of spirituality:       
   [] Not able to assess:                   
 
    
Identified resources for coping:  
   [x] Prayer                           
   [] Music                  [] Guided Imagery [x] Family/friends                 [] Pet visits [] Devotional reading                         [] Unknown 
   [] Other:                                         
 
 
Interventions offered during this visit: (See comments for more details) Family/Friend(s): Affirmation of emotions/emotional suffering, Catharsis/review of pertinent events in supportive environment, Prayer (assurance of) Plan of Care: 
 
 [x] Support spiritual and/or cultural needs  
 [] Support AMD and/or advance care planning process    
 [] Support grieving process 
 [] Coordinate Rites and/or Rituals  
 [] Coordination with community clergy [] No spiritual needs identified at this time 
 [] Detailed Plan of Care below (See Comments)  [] Make referral to Music Therapy 
[] Make referral to Pet Therapy    
[] Make referral to Addiction services 
[] Make referral to [facility] 
[] Make referral to Spiritual Care Partner 
[] No future visits requested       
[x] Follow up visits as needed Comments: Visited Ms Gina Guzman in [place] for ongoing spiritual/emotional support of patient and family post transitioning to comfort care. Patient's daughter and granddaughter were at Ms Anna's bedside. Daughter was a little tearful but she smiled and said that they were doing okay; denied any specific needs at that time. Provided reassurance of  availability for support. : . Dragan Latif. Zoltan Salazar; [facility], to contact [phone] call: [phone]

## 2020-01-24 NOTE — PROGRESS NOTES
01/24/20 6350 Weaning Parameters Spontaneous Breathing Trial Complete Yes Resp Rate Observed 19 Ve 6.5  Valente Agitation Sedation Scale (RASS) -5

## 2020-01-24 NOTE — PROGRESS NOTES
responded to visit with family as pt was transitioning to comfort care. Pt's daughter and grand daughter were in the waiting area and another family was at bedside. Pt's daughter Lorie Paul was very understanding and shared stories about her mother. How her mother was always on the go, she loved decorating and her and her  had a [employer] business. She also shared how pt loved gardening and flowers and when she wasn't able to do the gardening herself she would get her son or hire someone to make the flower beds beautiful. Family is supporting each other.  provided pastoral listening, support and assurance of prayer. Let family know of  support and availability.  follow up as needed. Megan Charles, [facility] 
 [phone] ([phone])

## 2020-01-24 NOTE — PROGRESS NOTES
Patient stable this AM, continued on mechanical ventilation, daughter at beside, discussed GOC, daughter decided to transition patient to comfort care with palliative extubation. Will proceed with comfort care and extubation, discussed with the daughter regarding post extubation plans. Will remain in the ICU, plan for transfer in the evening or 1/25. Ricardo Ferreira MD

## 2020-01-24 NOTE — PROGRESS NOTES
2330: Bedside shift change report given to [address],[address] Floor, RN (oncoming nurse) by Kimberley Diaz RN (offgoing nurse). Report included the following information SBAR, Kardex, ED Summary, Intake/Output, MAR, Recent Results, Med Rec Status, Cardiac Rhythm SR and Alarm Parameters .

## 2020-01-24 NOTE — PROGRESS NOTES
Patient is now comfort care Will sign off Please call us with any questions Brittany Hubbard MD 
[address] [place] [address], [address] [facility] Phone - [phone] Fax - [phone] 
www. [facility]

## 2020-01-25 NOTE — HOSPICE
[employer] Good Help to Those in Need 
[phone] Patient Name: Alex Beltran YOB: 1928 Age: 80 y.o. [employer] RN Note:  Hospice consult noted. Chart reviewed. Plan of care discussed with patients nurse & care manager. In to meet with patient, patient's daughter, Jin Martinez [phone], and sister-in-law, Karma Ortega. Roxi shared that she does not want her mother to return to the [address] [facility], and that prior to her mother transferring from ICU, she was told that her mother could stay at [facility] and not have to leave. Discussed Hospice philosophy, general plan of care, levels of care, services and on call procedures. Family information packet provided & reviewed with Roxi and Romi Cannon. Roxi' affect changed once liaison reviewed inpatient criteria, and that currently, pt did not meet this criteria. Pt appears comfortable, and is resting on her side. Pt has some secretions draining from her mouth. Reviewed options for disposition, and how hospice care could offer support. At this point, Romi Cannon, appeared to be more engaged in conversation, while Roxi seemed more detached. Liaison offered to review patient's insurance benefits and coverage for additional care giver help. Liaison to check Medicaid coverage. Roxi states that pt does not have Medicaid. Plan to review with hospice social worker on call and then contact Roxi with appropriate feedback. Thank you for the opportunity to be of service to this patient. Serjio Lerma RN, [facility] Hospice Nurse Liaison [phone] [place] [phone] Office 15:08: Pt appears comfortably resting in bed. She does not respond to nurse calling her name. Family is no longer bedside. Telephone call made to [facility]. Shared this update of her mother, and offered that hospice will review patient on Sunday for inpatient criteria.  [facility] agrees that she would like for hospice to review patient on Sunday, and states that she does not want her mother to leave. Roxi expressed to unit nurse, Cassandra Razo, that she is not interested in having her mother go to the [facility] either. Plan to follow-up with patient and family on Sunday. 15:15: Dr. Sylvester Chopra notified of this above through [address] [place] [address].  Dr. Sylvester Chopra put in the hospice consult order this am.

## 2020-01-25 NOTE — PROGRESS NOTES
Bedside and Verbal shift change report given to HUGO Bowen (oncoming nurse) by Jose Bright (offgoing nurse). Report included the following information SBAR, Kardex, Intake/Output, MAR, Recent Results, Cardiac Rhythm 1st degree AV block and Alarm Parameters .

## 2020-01-25 NOTE — HOSPICE
[facility] Good Help to Those in Need 
[phone] Patient Name: Margarita Wright YOB: 1928 Age: 80 y.o. [facility] RN Note:  Hospice consult received, from [facility]. Currently reviewing chart and will follow up with Unit Nurse, Eber Mitchell to discuss plan of care, patient status and discharge disposition within the hour. Telephone contact made with daughter, Suraj Aguilar, and plan to meet bedside at 12:30 today. Thank you for the opportunity to be of service to this patient. Amna Wolff RN, [facility] Hospice Nurse Liaison [phone] Mobile [phone] Office

## 2020-01-25 NOTE — PROGRESS NOTES
[facility] Adult  Hospitalist Group ICU Transfer/Accept Summary This patient is being [place] [address] ICU 
DATE OF TRANSFER: 1/25/2020 PATIENT ID: Margarita Wright MRN: [id number] YOB: 1928 PRIMARY CARE PROVIDER: Jackie Kelsey MD  
DATE OF ADMISSION: 1/19/2020  7:58 AM   
ATTENDING PHYSICIAN: Leonid Quinones MD 
CONSULTATIONS:  
IP CONSULT TO INTENSIVIST 
IP CONSULT TO NEPHROLOGY 
IP CONSULT TO NEUROLOGY PROCEDURES/SURGERIES:  
* No surgery found * REASON FOR ADMISSION: <principal problem not specified> HOSPITAL PROBLEM LIST: 
Patient Active Problem List  
Diagnosis Code  Parkinson disease ([facility] [address] G20  
 HTN (hypertension) I10  
 Hyperlipemia E78.5  S/P TKR (total knee replacement) I45.842  S/P radioactive iodine thyroid ablation Z92.3  
 S/P NINI-BSO Z90.710, Z90.722, Z90.79  Environmental allergies Z91.09  
 Anemia D64.9  
 History of screening mammography Z92.89  Renal cell carcinoma (HCC) C64.9  Pre-syncope R55  Parkinson's disease dementia ([facility] [address] [facility] G20, F02.80  Generalized weakness R53.1  Laceration of head S01. 91XA  Fall W19. Jimmye Rise  Hypothermia T68. Jimmye Rise  Pneumonia J18.9  Acute respiratory failure (HCC) J96.00  
 Hypoglycemia E16.2 Brief HPI and Hospital Course:   
 
 
Assessment and Plan: A 29-year-old female with history of Parkinson's disease who was lethargic for two to three days prior to admission and then admitted in unresponsive state with severe hypoglycemia, bradycardia and respiratory failure. Neurologically, she has failed to improve. 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 These were likely related to his severe hypotension and bradycardia. It is possible that she has underlying carotid stenoses.   Clinically, she opens her eyes, withdraws her lower extremities to pain but no movement seen in the arms. Bilateral watershed zone infarctions can cause bilateral paralysis, visual deficits, seizures, and cognitive impairment. Discussed with Dr Debora Ferrara he tells me she is comfort care only will discuss with family and involve hospice. PHYSICAL EXAMINATION: 
Visit Vitals /49 (BP 1 Location: Right arm, BP Patient Position: At rest) Pulse 69 Temp 98.3 °F (36.8 °C) Resp 20 Ht 5' 5\" (1.651 m) Wt 64.5 kg (142 lb 3.2 oz) SpO2 100% BMI 23.66 kg/m² General:          Alert, cooperative, no distress HEENT:           Atraumatic, MMM Neck:               Supple, symmetrical,  thyroid: non tender Lungs:             Clear to auscultation bilaterally. No Wheezing or Rhonchi. No rales. Heart:              Regular  rhythm,  No  murmur   No edema Abdomen:       Soft, non-tender. Not distended. Bowel sounds normal 
Extremities:     No cyanosis. No clubbing,  +2 distal pulses Skin:                Not pale. Not Jaundiced  No rashes Psych:             Not anxious or agitated. Neurologic:      Alert, moves all extremities, oriented X 3.  
 
CODE STATUS: 
 Full Code DNR Partial  
 Comfort Care Signed:  
Veena Meredith MD 
1/25/2020 
8:41 AM

## 2020-01-25 NOTE — PROGRESS NOTES
36  Met with daughter, Jin Martinez [phone], brother, and sister in law at bedside. Discussed hospice with Roxi, freedom of choice offered. She would like referral sent to [address] [place] [address]. Referral sent via CC link. Call made to Hospice Liaison to make aware of referral.  Patient is being transferred to [facility]. Signed freedom of choice on bedside chart for scanning. 7710  Consult received: 
 
01/25/20 0900  IP CONSULT TO HOSPICE ONE TIME    Complete  Discontinue Question: Reason for consult: Answer: End of life care 01/25/20 4061 Call placed to ICU, spoke with Jessi/HUGO. Discussed hospice consult, Jin Martinez (daughter) at bedside. CM will meet with patient/daughter/family in ICU. Nadiya Chapman, RN, BSN, [facility] 
ED Care Management [phone]

## 2020-01-26 NOTE — PROGRESS NOTES
[facility] Adult  Hospitalist Group Hospitalist Progress Note Jose Laws MD 
Answering service: [phone] from in house phone Date of Service:  2020 NAME:  Laly Gardiner :  1928 MRN:  [id number] Admission Summary: Sheri Shelby is 79 yo AAF with h/o CKD, dementia, Parkinson's disease, anemia and hypertension. She was recently discharged on  after treatment of pneumonia. She lives in nursing home, she was sent to [place] [facility] today due to altered mental status and hypothermia. Her BG was found to be 16 in ER. She was give bolus of d50W which improved her blood sugar. She was also emergently intubated for airway protection. She was sent to [facility] ICU for Interval history / Subjective:  
  Seems comfortable. No family on bed side. Assessment & Plan:  
 
49-year-old female with history of Parkinson's disease who was lethargic for two to three days prior to admission and then admitted in unresponsive state with severe hypoglycemia, bradycardia and respiratory failure.  Neurologically, she has failed to improve.  An MRI done at [facility] showed extensive areas of watershed distribution infarction in both hemispheres. Deloras Asp were likely related to his severe hypotension and bradycardia.  It is possible that she has underlying carotid stenoses.  Clinically, she opens her eyes, withdraws her lower extremities to pain but no movement seen in the arms.  Bilateral watershed zone infarctions can cause bilateral paralysis, visual deficits, seizures, and cognitive impairment. Family has decided for comfort care only. She has been seen by hospice and seems comfortable. Code status: DNR 
DVT prophylaxis:  
 
Care Plan discussed with: Nurse Disposition: TBD Hospital Problems  Date Reviewed: 2017 Codes Class Noted POA  Acute respiratory failure (HCC) ICD-10-CM: J96.00 
 ICD-9-CM: 518.81  1/19/2020 Unknown Hypoglycemia ICD-10-CM: E16.2 ICD-9-CM: 251.2  1/19/2020 Unknown Review of Systems:  
Pertinent items are noted in HPI. Vital Signs:  
 Last 24hrs VS reviewed since prior progress note. Most recent are: 
Visit Vitals /69 Pulse 79 Temp 98.2 °F (36.8 °C) Resp 20 Ht 5' 5\" (1.651 m) Wt 64.5 kg (142 lb 3.2 oz) SpO2 94% BMI 23.66 kg/m² No intake or output data in the 24 hours ending 01/26/20 5216 Physical Examination:  
 
 
     
Constitutional:  No acute distress, cooperative, pleasant ENT:  Oral mucous moist, oropharynx benign. Resp:  CTA bilaterally. No wheezing/rhonchi/rales. No accessory muscle use CV:  Regular rhythm, normal rate, no murmurs, gallops, rubs GI:  Soft, non distended, non tender. normoactive bowel sounds, no hepatosplenomegaly Musculoskeletal:  No edema, warm, 2+ pulses throughout Neurologic:  Moves all extremities. AAOx3, CN II-XII reviewed Skin:  Good turgor, no rashes or ulcers Data Review:  
 Review and/or order of clinical lab test 
 
 
Labs:  
 
Recent Labs  
  01/24/20 
0446 WBC 6.2 HGB 7.8* HCT 25.4*  
* Recent Labs  
  01/24/20 
7904   
K 4.6  CO2 24 BUN 47* CREA 2.11* GLU 77  
CA 8.1*  
MG 2.0 PHOS 3.3 No results for input(s): SGOT, GPT, ALT, AP, TBIL, TBILI, TP, ALB, GLOB, GGT, AML, LPSE in the last 72 hours. No lab exists for component: AMYP, HLPSE No results for input(s): INR, PTP, APTT, INREXT in the last 72 hours. No results for input(s): FE, TIBC, PSAT, FERR in the last 72 hours. No results found for: FOL, RBCF No results for input(s): PH, PCO2, PO2 in the last 72 hours. No results for input(s): CPK, CKNDX, TROIQ in the last 72 hours. No lab exists for component: CPKMB Lab Results Component Value Date/Time  Cholesterol, total 210 (H) 12/12/2017 12:32 AM  
 HDL Cholesterol 63 12/12/2017 12:32 AM  
 LDL, calculated 133.4 (H) 12/12/2017 12:32 AM  
 Triglyceride 68 12/12/2017 12:32 AM  
 CHOL/HDL Ratio 3.3 12/12/2017 12:32 AM  
 
Lab Results Component Value Date/Time Glucose (POC) 101 (H) 01/24/2020 07:10 AM  
 Glucose (POC) 98 01/23/2020 06:43 AM  
 Glucose (POC) 77 01/23/2020 06:13 AM  
 Glucose (POC) 77 01/23/2020 05:39 AM  
 Glucose (POC) 84 01/22/2020 11:52 PM  
 
Lab Results Component Value Date/Time Color DARK YELLOW 12/31/2019 11:43 PM  
 Appearance CLOUDY (A) 12/31/2019 11:43 PM  
 Specific gravity 1.019 12/31/2019 11:43 PM  
 pH (UA) 5.0 12/31/2019 11:43 PM  
 Protein 300 (A) 12/31/2019 11:43 PM  
 Glucose NEGATIVE  12/31/2019 11:43 PM  
 Ketone NEGATIVE  12/31/2019 11:43 PM  
 Bilirubin NEGATIVE  12/31/2019 11:43 PM  
 Urobilinogen 1.0 12/31/2019 11:43 PM  
 Nitrites NEGATIVE  12/31/2019 11:43 PM  
 Leukocyte Esterase NEGATIVE  12/31/2019 11:43 PM  
 Epithelial cells FEW 12/31/2019 11:43 PM  
 Bacteria 2+ (A) 12/31/2019 11:43 PM  
 WBC 0-4 12/31/2019 11:43 PM  
 RBC 0-5 12/31/2019 11:43 PM  
 
 
 
Medications Reviewed:  
 
Current Facility-Administered Medications Medication Dose Route Frequency  ketorolac (TORADOL) injection 15 mg  15 mg IntraVENous Q6H PRN  
 glycopyrrolate (ROBINUL) injection 0.1 mg  0.1 mg IntraVENous TID PRN  
 LORazepam (ATIVAN) injection 1 mg  1 mg IntraVENous Q15MIN PRN  
 morphine injection 2 mg  2 mg IntraVENous Q15MIN PRN  
 
______________________________________________________________________ EXPECTED LENGTH OF STAY: 4d 21h ACTUAL LENGTH OF STAY:          7 Brooklynn Sandra MD

## 2020-01-26 NOTE — HOSPICE
[facility] Group Liaison note: 
 
Visited patient in room, no family present. Patient is unresponsive. Patient appeared comfortable at this time. Will follow up again tomorrow. Patient would not meet criteria today for inpatient hospice care as symptoms appear managed. Discussed by telephone with [address] [place] [address]. Thank you, 
 
Aimee De La Rosa RN [facility]

## 2020-01-27 ENCOUNTER — PATIENT OUTREACH (OUTPATIENT)
Dept: CASE MANAGEMENT | Age: 85
End: 2020-01-27

## 2020-01-27 NOTE — PROGRESS NOTES
1/27/2020       Events prior to patients death:  Called to bedside at 7:32 PM for unresponsive and pulseless patient.      On exam, no heart sounds or breath sounds were noted after 1 minute of auscultation. Pupils were fixed and dilated without pupillary light reflex.      Patient was pronounced dead on 1/26/2020  at 1910 hours     Date/Time of death: 1/26/2020 at 1910.        Cause:  Immediate: Acute CVA    CTN episode closed, removed from care team, goals addressed.     Emily Luke RN, [facility], [facility]  Care transitions nurse [phone]  [facility] Coordination Team

## 2020-01-27 NOTE — PROGRESS NOTES
Responded to page for this pt's death in [facility] [phone]. No family or friends present during this visit. Contact spiritual care services for any spiritual or emotional support needs. Burt Huston MDiv. Staff  Request  Support/Spiritual Care Services via [facility]

## 2020-01-27 NOTE — PROGRESS NOTES
Death Pronouncement Note Events prior to patients death: 
Called to bedside at 7:32 PM for unresponsive and pulseless patient. On exam, no heart sounds or breath sounds were noted after 1 minute of auscultation. Pupils were fixed and dilated without pupillary light reflex. Patient was pronounced dead on 1/26/2020  at 1910 hours Date/Time of death: 1/26/2020 at 1910. Cause: 
Immediate: Acute CVA Underlying cause:  
Hospital Problems  Date Reviewed: 12/13/2017 Codes Class Noted POA Acute respiratory failure ([facility] [address] ICD-10-CM: J96.00 
ICD-9-CM: 518.81  1/19/2020 Unknown Hypoglycemia ICD-10-CM: E16.2 ICD-9-CM: 251.2  1/19/2020 Unknown Physician Pronouncing Death: Aravind Rivera MD 
 
 
Attending Physician of Record:   Daniela Tabares MD  
 
Events related to death: 
Was code called: No 
 notified: No 
Family notified: Yes Autopsy requested:No  
Death certificate completed: No 
Code Status Prior to Death: DNR Discharge summary and death certificate will be completed by: Daniela Tabares MD

## 2020-01-28 NOTE — DISCHARGE SUMMARY
[address] DISCHARGE SUMMARY Name:  Roxie Olivas 
MR#:  [id number] :  1928 ACCOUNT #:  [de-identified] ADMIT DATE:  2020 DISCHARGE DATE:  2020 DATE OF DEATH:  2020 ADMISSION DIAGNOSES:  Acute respiratory failure, severe hypoglycemia, and sinus bradycardia. DIAGNOSES ON DEATH:  Acute respiratory failure, hypoglycemia, stroke, COURSE DURING THE HOSPITAL STAY:  The patient is a 19-year-old female who was brought to the emergency room on . She had previous history significant for chronic kidney disease, dementia, Parkinson's, anemia, and hypertension. She was admitted to ICU due to altered mental status and hypothermia. In the emergency room, her blood sugar was 16. She was given a bolus of D50 which improved her sugar. She was gently intubated. The patient was in ICU; however, family members decided to withdraw care as MRI done showed extensive areas of watershed distribution infarct in both hemispheres. The patient was sent to floor. Family decided to consult hospice. The patient was seen by hospice. She was only continued on comfort care. On , the patient was found to be unresponsive and pulseless. She was pronounced dead. Joe Bejarano MD 
 
 
[id number] 
D:  2020 17:30 
T:  2020 6:55 
JOB #:  [id number]

## 2020-01-31 NOTE — CDMP QUERY
Patient admitted with hypoglycemia. Noted documentation of \"acute respiratory failure\" in H&P and progress notes throughout stay. Please document in progress notes clinical indicators (such as the ones below) to support this diagnosis. - Respirations <12 or >25 - Air hunger/gasping - Use of accessory muscles of respiration/increased work of breathing - Sternal or intercostal retractions - Stridor - Inability to speak in full sentences - Cyanosis - Pulse ox <90% RA or <95% on O2  
- pH <7.35 or >7.45  
- pO2 < 60 mm Hg (or 10mm below COPD patient's baseline) - pCO2 >50mm Hg (or 10mm above COPD patient's baseline) The medical record reflects the following: 
   Risk Factors: recurrent pneumonia, GCS 3 on admission Clinical Indicators: CXR 1/19 showing complete consolidation L lung, per  H&P \"emergently intubated for airway protection\", RR 16-21 in ER Treatment: intubation, mechanical ventilation, IV abx Thank you, Oswald MCFARLANDN, RN  
[phone]

## 2020-02-10 NOTE — CDMP QUERY
Pt admitted with hypoglycemia and has \"CHF. No evidence of acute exacerbation\" documented on H&P. Please further specify type and acuity of CHF in the medical record. ? Chronic Systolic CHF 
? Other, please specify ? Clinically unable to determine The medical record reflects the following: 
  Risk Factors: 89F, HTN, CKD, hx/o CHF Clinical Indicators: 1/19/20 ECHO report \"Normal cavity size. Moderate concentric hypertrophy . Mild systolic dysfunction. The estimated ejection fraction is 45 - 50%. \" Treatment: coreg, IV lasix Thank you, Evy Gilliam BSN, RN  
[phone]

## 2020-02-10 NOTE — CDMP QUERY
Patient admitted with hypoglycemia, noted to have  \"lost~14% body weight in the past 8 months. Appears to have temporal, clavicular and scapular wasting. Edema of all extremities. Recently hospitalized and noted to have poor appetite-suspect related to dementia. \" as documented by Sharon De RD on 1/20 Progress Note. If possible, please document in progress notes and d/c summary if you are evaluating and/or treating any of the following: 
 
=> Chronic Moderate Protein-Calorie Malnutrition 
=> Chronic Severe Protein-Calorie Malnutrition =>Other Explanation of clinical findings =>Clinically Undetermined (no explanation for clinical findings) The medical record reflects the following: 
   Risk Factors: dementia, parkinson's disease, unresponsive patient on vent Clinical Indicators: poor appetite r/t dementia, Progress note 1/20 RD documented \"has lost~14% body weight in the past 8 months. Appears to have temporal, clavicular and scapular wasting. Edema of all extremities. Recently hospitalized and noted to have poor appetite-suspect related to dementia. \", weight on 1/4/20 138lb 1/20/20 125lbs, albumin 2.1 Treatment: initiation of tube feeding and electrolytes, RD consult Please clarify and document your clinical opinion in the progress notes and discharge summary including the definitive and/or presumptive diagnosis, (suspected or probable), related to the above clinical findings. Please include clinical findings supporting your diagnosis. Thank you, Tian GATICA, RN  
[phone]

## 2023-03-27 NOTE — ED TRIAGE NOTES
Detail Level: Detailed Patient comes to the ER via EMS from [facility] [address] [facility] at [address] [place] [address]. Patient was walking in her room when she became dizzy and fell. +Lac to R side of forehead. Early onset dementia. Detail Level: Generalized Detail Level: Zone

## 2025-01-07 NOTE — PROGRESS NOTES
[facility] Progress Note    3001 Pt admit to [facility] for labs/Procrit. Pt in wheelchair and in stable condition. Assessment completed. Pt had a recent fall and broke her hand. Swelling noted to right hand. Caregiver at chairside. Labs drawn from port and sent for processing. Visit Vitals    /50 (BP 1 Location: Left arm, BP Patient Position: Sitting)    Pulse (!) 44    Temp 97.6 °F (36.4 °C)    Resp 18    Breastfeeding No     Medications:  Procrit-SQ-right arm    1515 Pt tolerated treatment well. D/c home ambulatory in no distress. Pt aware of next appointment scheduled for 06/14/18 at 1400. English